# Patient Record
Sex: FEMALE | Race: BLACK OR AFRICAN AMERICAN | Employment: PART TIME | URBAN - METROPOLITAN AREA
[De-identification: names, ages, dates, MRNs, and addresses within clinical notes are randomized per-mention and may not be internally consistent; named-entity substitution may affect disease eponyms.]

---

## 2017-01-15 ENCOUNTER — HOSPITAL ENCOUNTER (EMERGENCY)
Age: 27
Discharge: HOME | End: 2017-01-15
Attending: EMERGENCY MEDICINE
Payer: SELF-PAY

## 2017-01-15 VITALS
HEART RATE: 90 BPM | OXYGEN SATURATION: 100 % | BODY MASS INDEX: 36.65 KG/M2 | WEIGHT: 220 LBS | RESPIRATION RATE: 14 BRPM | HEIGHT: 65 IN | TEMPERATURE: 98.2 F | SYSTOLIC BLOOD PRESSURE: 118 MMHG | DIASTOLIC BLOOD PRESSURE: 81 MMHG

## 2017-01-15 DIAGNOSIS — K08.89 DENTALGIA: Primary | ICD-10-CM

## 2017-01-15 PROCEDURE — 99283 EMERGENCY DEPT VISIT LOW MDM: CPT

## 2017-01-15 PROCEDURE — 74011000250 HC RX REV CODE- 250: Performed by: PHYSICIAN ASSISTANT

## 2017-01-15 PROCEDURE — 74011250637 HC RX REV CODE- 250/637: Performed by: PHYSICIAN ASSISTANT

## 2017-01-15 RX ORDER — PENICILLIN V POTASSIUM 500 MG/1
500 TABLET, FILM COATED ORAL 4 TIMES DAILY
Qty: 28 TAB | Refills: 0 | Status: SHIPPED | OUTPATIENT
Start: 2017-01-15 | End: 2017-01-22

## 2017-01-15 RX ORDER — TRAMADOL HYDROCHLORIDE 50 MG/1
50 TABLET ORAL
Qty: 20 TAB | Refills: 0 | Status: SHIPPED | OUTPATIENT
Start: 2017-01-15 | End: 2017-04-16

## 2017-01-15 RX ORDER — IBUPROFEN 400 MG/1
800 TABLET ORAL
Status: COMPLETED | OUTPATIENT
Start: 2017-01-15 | End: 2017-01-15

## 2017-01-15 RX ORDER — ETONOGESTREL AND ETHINYL ESTRADIOL 11.7; 2.7 MG/1; MG/1
INSERT, EXTENDED RELEASE VAGINAL
COMMUNITY
End: 2017-12-07

## 2017-01-15 RX ORDER — NAPROXEN 500 MG/1
500 TABLET ORAL
Qty: 20 TAB | Refills: 0 | Status: SHIPPED | OUTPATIENT
Start: 2017-01-15 | End: 2017-02-22

## 2017-01-15 RX ADMIN — IBUPROFEN 800 MG: 400 TABLET, FILM COATED ORAL at 19:50

## 2017-01-15 RX ADMIN — LIDOCAINE HYDROCHLORIDE: 20 SOLUTION ORAL; TOPICAL at 19:50

## 2017-01-16 NOTE — ED NOTES
Patient (s)  given copy of dc instructions and 1 paper script(s) and 2 electronic scripts. Patient (s)  verbalized understanding of instructions and script (s). Patient given a current medication reconciliation form and verbalized understanding of their medications. Patient (s) verbalized understanding of the importance of discussing medications with  his or her physician or clinic they will be following up with. Patient alert and oriented and in no acute distress. Patient discharged home ambulatory with dental balls.

## 2017-01-16 NOTE — ED NOTES
Patient seen for complaints of upper L dental pain x 1 week that worsened over night. Reports some bleeding to gum area yesterday and today. Denies fever. Redness and swelling noted to gum area surround molars. Denies chipping tooth or lost filling. Emergency Department Nursing Plan of Care       The Nursing Plan of Care is developed from the Nursing assessment and Emergency Department Attending provider initial evaluation. The plan of care may be reviewed in the ED Provider note.     The Plan of Care was developed with the following considerations:   Patient / Family readiness to learn indicated by:verbalized understanding  Persons(s) to be included in education: patient  Barriers to Learning/Limitations:No    Signed     Carito Sims    1/15/2017   1949

## 2017-01-16 NOTE — DISCHARGE INSTRUCTIONS
Emergency [address] by [place] [facility]  [address] [facility], [address] [facility]  Open M, W, F: 8AM - 5PM and T, Th: 8AM-6PM  Phone: [phone], press 4  $70 for Emergency Care  $60 for first routine care, then pay by sliding scale based upon income. [facility][note 3]  [address] Bhakta [address]  Phone: [phone]    [address] [facility]  [address] [place] [address], [address]  Phone: [phone]  Fee: $75 Routine Extraction, $125 Complex Extraction  Open Monday - Friday 8AM - 5:00PM    [facility]  [address], [address]  Open Monday - Friday 8AM - 4:30 PM  Phone: [phone] of Dentistry Urgent [address] [place] [address], [address] [place] [address], [place] [address], 1st Floor  [facility] starting at 8:30 AM M-F  Phone: [phone], press 2  Fee: $150 per tooth (x-ray & extractions only)  Pediatrics Phone[de-identified] [phone], 8-5 M-F    [address] [facility], [address] [place] [address], 2nd Floor, [address] [place] [address] starting at 8:30 AM - 3 PM [address] [place] [address]  [address] [facility], [address] [place] [address]  Phone: [phone] or [phone]  Emergency Hours: 9:30AM - 11AM (extractions)  Simple tooth extraction $ per tooth. #75 for x-ray    [facility] Residents only, over the age of 25  Phone: [phone]. Leave message saying you need an appointment to register. Hours: Tuesday Evenings       Periodontal Abscess: Care Instructions  Your Care Instructions  A periodontal abscess is a pocket of pus in the tissues of the gum. It looks like a small red ball pushing out of the swollen gum. An abscess can occur with serious gum disease (periodontitis), which causes the gums to pull away from the teeth. This leaves deep pockets where bacteria can grow.  If tartar builds up too much, or if food gets stuck in the pockets, pus forms. If the pus can't drain, it forms an abscess. An abscess can cause a fever and a throbbing pain in nearby teeth. It can also cause long-term damage to your teeth and gums. The teeth may get loose and fall out. The infection can spread to another part of your body. In most cases, your dentist will give you antibiotics to stop the infection. He or she may need to cut open (maddy) the abscess so that the infection can drain. This should relieve your pain. You may also need more dental treatment, such as tooth removal or oral surgery to fix bone damage caused by the abscess. Follow-up care is a key part of your treatment and safety. Be sure to make and go to all appointments, and call your doctor if you are having problems. It's also a good idea to know your test results and keep a list of the medicines you take. How can you care for yourself at home? · Reduce pain and swelling in your face and jaw by putting ice or a cold pack on the outside of your cheek for 10 to 20 minutes at a time. Put a thin cloth between the ice and your skin. · Take pain medicines exactly as directed. ¨ If the doctor gave you a prescription medicine for pain, take it as prescribed. ¨ If you are not taking a prescription pain medicine, ask your doctor if you can take an over-the-counter medicine. · Take your antibiotics as directed. Do not stop taking them just because you feel better. You need to take the full course of antibiotics. To prevent periodontal abscess  · Brush and floss every day, and have regular dental checkups. · Eat a healthy diet, and avoid sugary foods and drinks. · Do not smoke or use spit tobacco. Tobacco use slows your ability to heal. It also increases your risk for gum disease and cancer of the mouth and throat. If you need help quitting, talk to your doctor about stop-smoking programs and medicines.  These can increase your chances of quitting for good. When should you call for help? Call 911 anytime you think you may need emergency care. For example, call if:  · You have severe trouble breathing. Call your doctor now or seek immediate medical care if:  · You have new pain, or your pain gets a lot worse. · You have new symptoms, such as a fever, swelling in or around the abscess, or problems swallowing. Watch closely for changes in your health, and be sure to contact your doctor if:  · You do not get better as expected. Where can you learn more? Go to http://kalia-everett.info/. Enter O179 in the search box to learn more about \"Periodontal Abscess: Care Instructions. \"  Current as of: August 9, 2016  Content Version: 11.1  © 9159-5129 [employer]. Care instructions adapted under license by [employer] (which disclaims liability or warranty for this information). If you have questions about a medical condition or this instruction, always ask your healthcare professional. [address] any warranty or liability for your use of this information.

## 2017-01-16 NOTE — ED PROVIDER NOTES
Patient is a 23 y.o. female presenting with dental problem. The history is provided by the patient. Dental Pain    This is a new (Pt reports L upper dental pain starting yesterday evening. Denies fever, chills, drainage, n/v, dental caries. Reports seeing dentist a couple months ago and having normal check up.) problem. The current episode started yesterday. The problem occurs constantly. The problem has been gradually worsening. The pain is located in the left upper mouth. The quality of the pain is constant and aching. The pain is at a severity of 10/10. Associated symptoms include gum redness. There was no vomiting, no nausea, no fever, no swelling, no chest pain, no shortness of breath, no headaches and no drainage. The patient has no cardiac history. Past Medical History:   Diagnosis Date    Abnormal Pap smear     Anemia NEC      per pt takes iron PO    Asthma      bronchitis    Breastfeeding (infant)     Complication of anesthesia      never had    HX OTHER MEDICAL      Pregnant       Past Surgical History:   Procedure Laterality Date    Hx gyn       c section         Family History:   Problem Relation Age of Onset    Cancer Maternal Grandfather     Diabetes Paternal Grandmother        Social History     Social History    Marital status: SINGLE     Spouse name: N/A    Number of children: N/A    Years of education: N/A     Occupational History    Not on file. Social History Main Topics    Smoking status: Former Smoker    Smokeless tobacco: Former User     Quit date: 8/26/2015    Alcohol use Yes      Comment: socially    Drug use: No    Sexual activity: Yes     Partners: Male     Birth control/ protection: Condom, None     Other Topics Concern    Not on file     Social History Narrative         ALLERGIES: Review of patient's allergies indicates no known allergies. Review of Systems   Constitutional: Negative. Negative for activity change, chills, fatigue and fever.    HENT: Positive for dental problem. Negative for congestion, drooling, ear discharge, ear pain, facial swelling, hearing loss, mouth sores, nosebleeds, postnasal drip, rhinorrhea, sinus pressure, sore throat and trouble swallowing. Eyes: Negative. Negative for pain and discharge. Respiratory: Negative. Negative for cough, chest tightness and shortness of breath. Cardiovascular: Negative. Negative for chest pain. Gastrointestinal: Negative for constipation, diarrhea, nausea and vomiting. Genitourinary: Negative. Musculoskeletal: Negative. Negative for joint swelling. Skin: Negative. Negative for rash. Neurological: Negative. Negative for dizziness, speech difficulty, light-headedness and headaches. Psychiatric/Behavioral: Negative. Vitals:    01/15/17 1939   BP: 118/81   Pulse: 90   Resp: 14   Temp: 98.2 °F (36.8 °C)   SpO2: 100%   Weight: 99.8 kg (220 lb)   Height: 5' 5\" (1.651 m)            Physical Exam   Constitutional: She is oriented to person, place, and time. She appears well-developed and well-nourished. No distress. HENT:   Head: Normocephalic and atraumatic. Right Ear: Hearing, tympanic membrane, external ear and ear canal normal.   Left Ear: Hearing, tympanic membrane, external ear and ear canal normal.   Nose: Nose normal. No mucosal edema or rhinorrhea. Right sinus exhibits no maxillary sinus tenderness and no frontal sinus tenderness. Left sinus exhibits no maxillary sinus tenderness and no frontal sinus tenderness. Mouth/Throat: Oropharynx is clear and moist and mucous membranes are normal. She does not have dentures. No oral lesions. No trismus in the jaw. Normal dentition. No dental abscesses, uvula swelling, lacerations or dental caries. No oropharyngeal exudate, posterior oropharyngeal edema or posterior oropharyngeal erythema. Erythematous and TTP over left upper dentition.    Eyes: Conjunctivae and EOM are normal. Pupils are equal, round, and reactive to light.   Neck: Normal range of motion. Neck supple. Cardiovascular: Normal rate, regular rhythm and normal heart sounds. Pulmonary/Chest: Effort normal and breath sounds normal.   Neurological: She is alert and oriented to person, place, and time. No cranial nerve deficit. Skin: Skin is warm and dry. She is not diaphoretic. Psychiatric: She has a normal mood and affect. Her behavior is normal. Judgment and thought content normal.   Nursing note and vitals reviewed. MDM  Number of Diagnoses or Management Options  Dentalgia:   Diagnosis management comments: DDx: dentalgia, dental abscess, dental caries    LABORATORY TESTS:  No results found for this or any previous visit (from the past 12 hour(s)). IMAGING RESULTS:  No orders to display    MEDICATIONS GIVEN:  Medications  dental ball (lidocaine/Benadryl/Cetacaine) mixture ( Mucous Membrane Given 1/15/17 1950)  ibuprofen (MOTRIN) tablet 800 mg (800 mg Oral Given 1/15/17 1950)    IMPRESSION:  Arianna Anthony  (primary encounter diagnosis)    PLAN:  1. Current Discharge Medication List    START taking these medications    naproxen (NAPROSYN) 500 mg tablet  Take 1 Tab by mouth two (2) times daily as needed. Qty: 20 Tab Refills: 0    penicillin v potassium (VEETID) 500 mg tablet  Take 1 Tab by mouth four (4) times daily for 7 days. Qty: 28 Tab Refills: 0    traMADol (ULTRAM) 50 mg tablet  Take 1 Tab by mouth every six (6) hours as needed for Pain. Max Daily Amount: 200 mg. Qty: 20 Tab Refills: 0      CONTINUE these medications which have NOT CHANGED    ethinyl estradiol-etonogestrel (NUVARING) 0.12-0.015 mg/24 hr vaginal ring  by Intravaginal route. !! albuterol (PROVENTIL HFA, VENTOLIN HFA, PROAIR HFA) 90 mcg/actuation inhaler  Take 1 Puff by inhalation every four (4) hours as needed for Wheezing or Shortness of Breath.   Qty: 1 Inhaler Refills: 0    !! albuterol (PROVENTIL HFA, VENTOLIN HFA, PROAIR HFA) 90 mcg/actuation inhaler  Take 1-2 Puffs by inhalation every four (4) hours as needed for Wheezing. Qty: 1 Inhaler Refills: 0    !! - Potential duplicate medications found. Please discuss with provider. STOP taking these medications    chlorpheniramine-HYDROcodone (TUSSIONEX PENNKINETIC ER) 10-8 mg/5 mL suspension  Comments:  Reason for Stopping:    cetirizine-psuedoePHEDrine (ZYRTEC-D) 5-120 mg per tablet  Comments:  Reason for Stopping:    cyclobenzaprine (FLEXERIL) 10 mg tablet  Comments:  Reason for Stoppin. Follow-up Information     Follow up With Details Comments Lanie Guadarrama MD Schedule an appointment as soon as possible   for a visit in 1 week As needed, If symptoms worsen [address] [facility] [address] [facility] Schedule an appointment as soon   as possible for a visit in 1 week As needed, If symptoms worsen Gardner Ji  [phone]      Return to ED if worse                  Amount and/or Complexity of Data Reviewed  Tests in the medicine section of CPT®: ordered and reviewed    Patient Progress  Patient progress: stable    ED Course       Procedures    7:55 PM  I have discussed with patient their diagnosis, treatment, and follow up plan. The patient agrees to follow up as outlined in discharge paperwork and also to return to the ED with any worsening.  Guzman Ramon PA-C

## 2017-02-22 ENCOUNTER — HOSPITAL ENCOUNTER (EMERGENCY)
Age: 27
Discharge: HOME | End: 2017-02-22
Attending: EMERGENCY MEDICINE
Payer: MEDICAID

## 2017-02-22 ENCOUNTER — APPOINTMENT (OUTPATIENT)
Dept: GENERAL RADIOLOGY | Age: 27
End: 2017-02-22
Attending: EMERGENCY MEDICINE
Payer: MEDICAID

## 2017-02-22 VITALS
WEIGHT: 210 LBS | RESPIRATION RATE: 18 BRPM | OXYGEN SATURATION: 99 % | HEART RATE: 98 BPM | TEMPERATURE: 98.3 F | HEIGHT: 65 IN | SYSTOLIC BLOOD PRESSURE: 121 MMHG | DIASTOLIC BLOOD PRESSURE: 66 MMHG | BODY MASS INDEX: 34.99 KG/M2

## 2017-02-22 DIAGNOSIS — S90.31XA CONTUSION OF RIGHT FOOT, INITIAL ENCOUNTER: Primary | ICD-10-CM

## 2017-02-22 PROCEDURE — 73630 X-RAY EXAM OF FOOT: CPT

## 2017-02-22 PROCEDURE — 99283 EMERGENCY DEPT VISIT LOW MDM: CPT

## 2017-02-22 PROCEDURE — 74011250637 HC RX REV CODE- 250/637: Performed by: EMERGENCY MEDICINE

## 2017-02-22 RX ORDER — IBUPROFEN 600 MG/1
600 TABLET ORAL
Status: COMPLETED | OUTPATIENT
Start: 2017-02-22 | End: 2017-02-22

## 2017-02-22 RX ORDER — NAPROXEN 500 MG/1
500 TABLET ORAL
Qty: 20 TAB | Refills: 0 | Status: SHIPPED | OUTPATIENT
Start: 2017-02-22 | End: 2017-04-16

## 2017-02-22 RX ADMIN — IBUPROFEN 600 MG: 600 TABLET, FILM COATED ORAL at 14:43

## 2017-02-22 NOTE — ED PROVIDER NOTES
Patient is a 23 y.o. female presenting with foot pain. The history is provided by the patient. Foot Pain    This is a new (Acute Rt foot pain and sweeling immediately PTA after a TV fell on pt's foot. ) problem. The current episode started less than 1 hour ago. The problem occurs constantly. The problem has not changed since onset. The pain is present in the right foot. The quality of the pain is described as constant. The pain is at a severity of 10/10. Pertinent negatives include no numbness, full range of motion, no stiffness, no tingling, no itching, no back pain and no neck pain. The symptoms are aggravated by contact, movement and palpation. She has tried nothing for the symptoms. There has been a history of trauma. Past Medical History:   Diagnosis Date    Abnormal Pap smear     Anemia NEC     per pt takes iron PO    Asthma     bronchitis    Breastfeeding (infant)     Complication of anesthesia     never had    HX OTHER MEDICAL     Pregnant       Past Surgical History:   Procedure Laterality Date    HX GYN      c section         Family History:   Problem Relation Age of Onset    Cancer Maternal Grandfather     Diabetes Paternal Grandmother        Social History     Social History    Marital status: SINGLE     Spouse name: N/A    Number of children: N/A    Years of education: N/A     Occupational History    Not on file. Social History Main Topics    Smoking status: Current Every Day Smoker     Packs/day: 0.25    Smokeless tobacco: Former User     Quit date: 8/26/2015      Comment: black and milds    Alcohol use Yes      Comment: socially    Drug use: No    Sexual activity: Yes     Partners: Male     Birth control/ protection: Condom, None     Other Topics Concern    Not on file     Social History Narrative         ALLERGIES: Review of patient's allergies indicates no known allergies. Review of Systems   Constitutional: Negative. Negative for chills, fatigue and fever. Respiratory: Negative. Negative for cough and shortness of breath. Cardiovascular: Negative. Negative for chest pain, palpitations and leg swelling. Gastrointestinal: Negative. Negative for abdominal pain, diarrhea, nausea and vomiting. Genitourinary: Negative. Negative for difficulty urinating and dysuria. Musculoskeletal: Positive for arthralgias (Rt foot), joint swelling and myalgias. Negative for back pain, gait problem, neck pain and stiffness. Skin: Negative. Negative for color change, itching, rash and wound. Neurological: Negative. Negative for dizziness, tingling, numbness and headaches. Psychiatric/Behavioral: Negative. Vitals:    02/22/17 1435   BP: 121/66   Pulse: 98   Resp: 18   Temp: 98.3 °F (36.8 °C)   SpO2: 99%   Weight: 95.3 kg (210 lb)   Height: 5' 5\" (1.651 m)            Physical Exam   Constitutional: She is oriented to person, place, and time. She appears well-developed and well-nourished. No distress. HENT:   Head: Normocephalic and atraumatic. Right Ear: Hearing and external ear normal.   Left Ear: Hearing and external ear normal.   Nose: Nose normal.   Eyes: Conjunctivae and EOM are normal. Pupils are equal, round, and reactive to light. Neck: Normal range of motion. Pulmonary/Chest: Effort normal. No respiratory distress. Musculoskeletal:        Right ankle: Normal.        Right lower leg: Normal.        Right foot: There is tenderness, bony tenderness and swelling. There is normal capillary refill, no crepitus, no deformity and no laceration. Pt ambulatory into room. Minimal swelling to Rt foot. Negative erythema, wound, warmth. Neurological: She is alert and oriented to person, place, and time. No cranial nerve deficit. Skin: Skin is warm and dry. She is not diaphoretic. Psychiatric: She has a normal mood and affect. Her behavior is normal. Judgment and thought content normal.   Nursing note and vitals reviewed.        MDM  Number of Diagnoses or Management Options  Contusion of right foot, initial encounter:   Diagnosis management comments: DDx: Rt foot fx, dislocation, sprain, strain, contusion    LABORATORY TESTS:  No results found for this or any previous visit (from the past 12 hour(s)). IMAGING RESULTS:  XR FOOT RT MIN 3 V   Final Result   FINDINGS: Three views of the right foot demonstrate no fracture or other acute  osseous or articular abnormality. The soft tissues are within normal limits.     IMPRESSION  IMPRESSION: No acute abnormality. MEDICATIONS GIVEN:  Medications  ibuprofen (MOTRIN) tablet 600 mg (600 mg Oral Given 2/22/17 1443)    IMPRESSION:  Contusion of right foot, initial encounter  (primary encounter diagnosis)    PLAN:  1. Current Discharge Medication List    CONTINUE these medications which have CHANGED    naproxen (NAPROSYN) 500 mg tablet  Take 1 Tab by mouth two (2) times daily as needed. Qty: 20 Tab Refills: 0      CONTINUE these medications which have NOT CHANGED    traMADol (ULTRAM) 50 mg tablet  Take 1 Tab by mouth every six (6) hours as needed for Pain. Max Daily Amount: 200 mg. Qty: 20 Tab Refills: 0    ethinyl estradiol-etonogestrel (NUVARING) 0.12-0.015 mg/24 hr vaginal ring  by Intravaginal route. !! albuterol (PROVENTIL HFA, VENTOLIN HFA, PROAIR HFA) 90 mcg/actuation inhaler  Take 1 Puff by inhalation every four (4) hours as needed for Wheezing or Shortness of Breath. Qty: 1 Inhaler Refills: 0    !! albuterol (PROVENTIL HFA, VENTOLIN HFA, PROAIR HFA) 90 mcg/actuation inhaler  Take 1-2 Puffs by inhalation every four (4) hours as needed for Wheezing. Qty: 1 Inhaler Refills: 0    !! - Potential duplicate medications found. Please discuss with provider.         2. Follow-up Information     Follow up With Details Comments Contact Tamra Weeks MD Schedule an appointment as soon as possible   for a visit in 1 week As needed, If symptoms worsen [address]  [phone] [facility] Schedule an appointment as soon as possible for a visit in 1   week As needed, If symptoms worsen [address] [place] [address]  [phone]      Return to ED if worse                  Amount and/or Complexity of Data Reviewed  Tests in the radiology section of CPT®: ordered and reviewed  Tests in the medicine section of CPT®: ordered and reviewed    Patient Progress  Patient progress: stable    ED Course       Procedures      3:30 PM  I have discussed with patient their diagnosis, treatment, and follow up plan. The patient agrees to follow up as outlined in discharge paperwork and also to return to the ED with any worsening.  Therese Hinton PA-C

## 2017-02-22 NOTE — LETTER
[facility] EMERGENCY DEPT 
[address] [facility] [id number] [phone] Work/School Note Date: 2/22/2017 To Whom It May concern: 
 
Mago Poe was seen and treated today in the emergency room by the following provider(s): 
Physician Assistant: Cayden Schaffer PA-C. Mago Poe may return to work on 2/24/17.  
 
Sincerely, 
 
 
 
 
Mone Casillas

## 2017-02-22 NOTE — DISCHARGE INSTRUCTIONS
Contusion: Care Instructions  Your Care Instructions  Contusion is the medical term for a bruise. It is the result of a direct blow or an impact, such as a fall. Contusions are common sports injuries. Most people think of a bruise as a black-and-blue spot. This happens when small blood vessels get torn and leak blood under the skin. But bones, muscles, and organs can also get bruised. This may damage deep tissues but not cause a bruise you can see. The doctor will do a physical exam to find the location of your contusion. You may also have tests to make sure you do not have a more serious injury, such as a broken bone or nerve damage. These may include X-rays or other imaging tests like a CT scan or MRI. Deep-tissue contusions may cause pain and swelling. But if there is no serious damage, they will often get better in a few weeks with home treatment. The doctor has checked you carefully, but problems can develop later. If you notice any problems or new symptoms, get medical treatment right away. Follow-up care is a key part of your treatment and safety. Be sure to make and go to all appointments, and call your doctor if you are having problems. It's also a good idea to know your test results and keep a list of the medicines you take. How can you care for yourself at home? · Put ice or a cold pack on the sore area for 10 to 20 minutes at a time to stop swelling. Put a thin cloth between the ice pack and your skin. · Be safe with medicines. Read and follow all instructions on the label. ¨ If the doctor gave you a prescription medicine for pain, take it as prescribed. ¨ If you are not taking a prescription pain medicine, ask your doctor if you can take an over-the-counter medicine. · If you can, prop up the sore area on pillows as much as possible for the next few days. Try to keep the sore area above the level of your heart. When should you call for help?   Call your doctor now or seek immediate medical care if:  · Your pain gets worse. · You have new or worse swelling. · You have tingling, weakness, or numbness in the area near the contusion. · The area near the contusion is cold or pale. Watch closely for changes in your health, and be sure to contact your doctor if:  · You do not get better as expected. Where can you learn more? Go to [employer]  Enter [id number] in the search box to learn more about \"Contusion: Care Instructions. \"   © 6930-9045 [employer]. Care instructions adapted under license by [employer] (which disclaims liability or warranty for this information). This care instruction is for use with your licensed healthcare professional. If you have questions about a medical condition or this instruction, always ask your healthcare professional. [address] any warranty or liability for your use of this information. Content Version: 32.9.497591; Current as of: May 22, 2015           Learning About RICE (Rest, Ice, Compression, and Elevation)  What is RICE? RICE is a way to care for an injury. RICE helps relieve pain and swelling. It may also help with healing and flexibility. RICE stands for:  · Rest and protect the injured or sore area. · Ice or a cold pack used as soon as possible. · Compression, or wrapping the injured or sore area with an elastic bandage. · Elevation (propping up) the injured or sore area. How do you do RICE? You can use RICE for home treatment when you have general aches and pains or after an injury or surgery. Rest  · Do not put weight on the injury for at least 24 to 48 hours. · Use crutches for a badly sprained knee or ankle. · Support a sprained wrist, elbow, or shoulder with a sling. Ice  · Put ice or a cold pack on the injury right away to reduce pain and swelling. Frozen vegetables will also work as an ice pack. Put a thin cloth between the ice or cold pack and your skin.  The cloth protects the injured area from getting too cold. · Use ice for 10 to 15 minutes at a time for the first 48 to 72 hours. Compression  · Use compression for sprains, strains, and surgeries of the arms and legs. · Wrap the injured area with an elastic bandage or compression sleeve to reduce swelling. · Don't wrap it too tightly. If the area below it feels numb, tingles, or feels cool, loosen the wrap. Elevation  · Use elevation for areas of the body that can be propped up, such as arms and legs. · Prop up the injured area on pillows whenever you use ice. Keep it propped up anytime you sit or lie down. · Try to keep the injured area at or above the level of your heart. This will help reduce swelling and bruising. Where can you learn more? Go to http://kalia-everett.info/. Enter R414 in the search box to learn more about \"Learning About RICE (Rest, Ice, Compression, and Elevation). \"  Current as of: May 23, 2016  Content Version: 11.1  © 8962-2983 [employer]. Care instructions adapted under license by [employer] (which disclaims liability or warranty for this information). If you have questions about a medical condition or this instruction, always ask your healthcare professional. [address] any warranty or liability for your use of this information.

## 2017-02-22 NOTE — ED NOTES
Emergency Department Nursing Plan of Care       The Nursing Plan of Care is developed from the Nursing assessment and Emergency Department Attending provider initial evaluation. The plan of care may be reviewed in the ED Provider note.     The Plan of Care was developed with the following considerations:   Patient / Family readiness to learn indicated by:verbalized understanding  Persons(s) to be included in education: patient  Barriers to Learning/Limitations:No    Signed     Margaret Josue RN    2/22/2017   3:10 PM

## 2017-04-16 ENCOUNTER — APPOINTMENT (OUTPATIENT)
Dept: CT IMAGING | Age: 27
End: 2017-04-16
Attending: EMERGENCY MEDICINE
Payer: MEDICAID

## 2017-04-16 ENCOUNTER — HOSPITAL ENCOUNTER (EMERGENCY)
Age: 27
Discharge: HOME | End: 2017-04-16
Attending: EMERGENCY MEDICINE | Admitting: EMERGENCY MEDICINE
Payer: MEDICAID

## 2017-04-16 ENCOUNTER — APPOINTMENT (OUTPATIENT)
Dept: GENERAL RADIOLOGY | Age: 27
End: 2017-04-16
Attending: EMERGENCY MEDICINE
Payer: MEDICAID

## 2017-04-16 VITALS
RESPIRATION RATE: 10 BRPM | HEART RATE: 84 BPM | HEIGHT: 65 IN | TEMPERATURE: 98.6 F | SYSTOLIC BLOOD PRESSURE: 127 MMHG | WEIGHT: 222.44 LBS | BODY MASS INDEX: 37.06 KG/M2 | OXYGEN SATURATION: 99 % | DIASTOLIC BLOOD PRESSURE: 64 MMHG

## 2017-04-16 DIAGNOSIS — I30.9 ACUTE PERICARDITIS, UNSPECIFIED TYPE: ICD-10-CM

## 2017-04-16 DIAGNOSIS — R07.9 CHEST PAIN, UNSPECIFIED TYPE: Primary | ICD-10-CM

## 2017-04-16 LAB
ALBUMIN SERPL BCP-MCNC: 3 G/DL (ref 3.5–5)
ALBUMIN/GLOB SERPL: 0.7 {RATIO} (ref 1.1–2.2)
ALP SERPL-CCNC: 93 U/L (ref 45–117)
ALT SERPL-CCNC: 16 U/L (ref 12–78)
ANION GAP BLD CALC-SCNC: 9 MMOL/L (ref 5–15)
AST SERPL W P-5'-P-CCNC: 12 U/L (ref 15–37)
BASOPHILS # BLD AUTO: 0 K/UL (ref 0–0.1)
BASOPHILS # BLD: 0 % (ref 0–1)
BILIRUB SERPL-MCNC: 0.1 MG/DL (ref 0.2–1)
BUN SERPL-MCNC: 11 MG/DL (ref 6–20)
BUN/CREAT SERPL: 16 (ref 12–20)
CALCIUM SERPL-MCNC: 8.3 MG/DL (ref 8.5–10.1)
CHLORIDE SERPL-SCNC: 112 MMOL/L (ref 97–108)
CO2 SERPL-SCNC: 22 MMOL/L (ref 21–32)
CREAT SERPL-MCNC: 0.67 MG/DL (ref 0.55–1.02)
D DIMER PPP FEU-MCNC: 0.61 MG/L FEU (ref 0–0.65)
EOSINOPHIL # BLD: 0.2 K/UL (ref 0–0.4)
EOSINOPHIL NFR BLD: 2 % (ref 0–7)
ERYTHROCYTE [DISTWIDTH] IN BLOOD BY AUTOMATED COUNT: 17.1 % (ref 11.5–14.5)
GLOBULIN SER CALC-MCNC: 4.1 G/DL (ref 2–4)
GLUCOSE SERPL-MCNC: 126 MG/DL (ref 65–100)
HCT VFR BLD AUTO: 35.8 % (ref 35–47)
HGB BLD-MCNC: 11.2 G/DL (ref 11.5–16)
LIPASE SERPL-CCNC: 120 U/L (ref 73–393)
LYMPHOCYTES # BLD AUTO: 22 % (ref 12–49)
LYMPHOCYTES # BLD: 2.2 K/UL (ref 0.8–3.5)
MCH RBC QN AUTO: 24 PG (ref 26–34)
MCHC RBC AUTO-ENTMCNC: 31.3 G/DL (ref 30–36.5)
MCV RBC AUTO: 76.7 FL (ref 80–99)
MONOCYTES # BLD: 0.7 K/UL (ref 0–1)
MONOCYTES NFR BLD AUTO: 7 % (ref 5–13)
NEUTS SEG # BLD: 7 K/UL (ref 1.8–8)
NEUTS SEG NFR BLD AUTO: 69 % (ref 32–75)
PLATELET # BLD AUTO: 255 K/UL (ref 150–400)
POTASSIUM SERPL-SCNC: 4.2 MMOL/L (ref 3.5–5.1)
PROT SERPL-MCNC: 7.1 G/DL (ref 6.4–8.2)
RBC # BLD AUTO: 4.67 M/UL (ref 3.8–5.2)
SODIUM SERPL-SCNC: 143 MMOL/L (ref 136–145)
TROPONIN I SERPL-MCNC: <0.04 NG/ML
WBC # BLD AUTO: 10.2 K/UL (ref 3.6–11)

## 2017-04-16 PROCEDURE — 85379 FIBRIN DEGRADATION QUANT: CPT | Performed by: EMERGENCY MEDICINE

## 2017-04-16 PROCEDURE — 85025 COMPLETE CBC W/AUTO DIFF WBC: CPT | Performed by: EMERGENCY MEDICINE

## 2017-04-16 PROCEDURE — 80053 COMPREHEN METABOLIC PANEL: CPT | Performed by: EMERGENCY MEDICINE

## 2017-04-16 PROCEDURE — 74011636320 HC RX REV CODE- 636/320: Performed by: EMERGENCY MEDICINE

## 2017-04-16 PROCEDURE — 96374 THER/PROPH/DIAG INJ IV PUSH: CPT

## 2017-04-16 PROCEDURE — 74011250636 HC RX REV CODE- 250/636: Performed by: EMERGENCY MEDICINE

## 2017-04-16 PROCEDURE — 93005 ELECTROCARDIOGRAM TRACING: CPT

## 2017-04-16 PROCEDURE — 36415 COLL VENOUS BLD VENIPUNCTURE: CPT | Performed by: EMERGENCY MEDICINE

## 2017-04-16 PROCEDURE — 71010 XR CHEST PORT: CPT

## 2017-04-16 PROCEDURE — 83690 ASSAY OF LIPASE: CPT | Performed by: EMERGENCY MEDICINE

## 2017-04-16 PROCEDURE — 84484 ASSAY OF TROPONIN QUANT: CPT | Performed by: EMERGENCY MEDICINE

## 2017-04-16 PROCEDURE — 99284 EMERGENCY DEPT VISIT MOD MDM: CPT

## 2017-04-16 PROCEDURE — 71275 CT ANGIOGRAPHY CHEST: CPT

## 2017-04-16 RX ORDER — NEBULIZER AND COMPRESSOR
EACH MISCELLANEOUS
Qty: 1 EACH | Refills: 0 | Status: SHIPPED | OUTPATIENT
Start: 2017-04-16 | End: 2019-05-08

## 2017-04-16 RX ORDER — SODIUM CHLORIDE 0.9 % (FLUSH) 0.9 %
10 SYRINGE (ML) INJECTION
Status: COMPLETED | OUTPATIENT
Start: 2017-04-16 | End: 2017-04-16

## 2017-04-16 RX ORDER — ALBUTEROL SULFATE 0.83 MG/ML
2.5 SOLUTION RESPIRATORY (INHALATION)
Qty: 24 EACH | Refills: 1 | Status: SHIPPED | OUTPATIENT
Start: 2017-04-16 | End: 2019-05-08

## 2017-04-16 RX ORDER — SODIUM CHLORIDE 0.9 % (FLUSH) 0.9 %
5-10 SYRINGE (ML) INJECTION AS NEEDED
Status: DISCONTINUED | OUTPATIENT
Start: 2017-04-16 | End: 2017-04-16 | Stop reason: HOSPADM

## 2017-04-16 RX ORDER — KETOROLAC TROMETHAMINE 30 MG/ML
30 INJECTION, SOLUTION INTRAMUSCULAR; INTRAVENOUS
Status: COMPLETED | OUTPATIENT
Start: 2017-04-16 | End: 2017-04-16

## 2017-04-16 RX ORDER — SODIUM CHLORIDE 0.9 % (FLUSH) 0.9 %
5-10 SYRINGE (ML) INJECTION EVERY 8 HOURS
Status: DISCONTINUED | OUTPATIENT
Start: 2017-04-16 | End: 2017-04-16 | Stop reason: HOSPADM

## 2017-04-16 RX ORDER — SODIUM CHLORIDE 9 MG/ML
50 INJECTION, SOLUTION INTRAVENOUS
Status: COMPLETED | OUTPATIENT
Start: 2017-04-16 | End: 2017-04-16

## 2017-04-16 RX ORDER — NAPROXEN 500 MG/1
500 TABLET ORAL 2 TIMES DAILY WITH MEALS
Qty: 20 TAB | Refills: 0 | Status: SHIPPED | OUTPATIENT
Start: 2017-04-16 | End: 2017-04-26

## 2017-04-16 RX ADMIN — Medication 10 ML: at 10:40

## 2017-04-16 RX ADMIN — KETOROLAC TROMETHAMINE 30 MG: 30 INJECTION, SOLUTION INTRAMUSCULAR at 09:38

## 2017-04-16 RX ADMIN — SODIUM CHLORIDE 50 ML/HR: 900 INJECTION, SOLUTION INTRAVENOUS at 10:40

## 2017-04-16 RX ADMIN — IOPAMIDOL 100 ML: 755 INJECTION, SOLUTION INTRAVENOUS at 10:40

## 2017-04-16 RX ADMIN — Medication 10 ML: at 09:39

## 2017-04-16 NOTE — ED NOTES
Pt arrives to ED c/o CP when she coughs and lays down, pt states she has chronic bronchitis. Monitor MD jaylyn at bedside.

## 2017-04-16 NOTE — ED PROVIDER NOTES
HPI Comments: Nilson Childers is a 23 y.o. female with history significant for asthma and anemia presenting ambulatory to ED [facility] ED with c/o sudden onset of a cough and chest pain x one week. Pt reports she has chronic bronchitis and is currently on no treatments for it. Per pt, she has had a persistent cough for the past week with little relief. Pt reports her cough has exacerbated pain to her chest for the past week as well. Pt informs her chest pain is intermittent with a moderate 8/10 intensity and a pressuring sensation to the anterior chest. Pt reports her pain is improved with sitting and leaning forward and worse with laying flat. Pt states she has been taking her inhaler with mild improvement of her symptoms. Pt specifically denies any nausea, vomiting, fevers, chills, abdominal pain, urinary complications, or SOB. PCP: Brenna Mello MD    PSHx:   Social Hx: + EtOH; + Smoker; - Illicit Drugs    There are no other changes, complaints or physical findings at this time. Written by PENNY Pope, as dictated by Raz Gilbert MD.     The history is provided by the patient. Past Medical History:   Diagnosis Date    Abnormal Pap smear     Anemia NEC     per pt takes iron PO    Asthma     bronchitis    Breastfeeding (infant)     Complication of anesthesia     never had    HX OTHER MEDICAL     Pregnant     Past Surgical History:   Procedure Laterality Date    HX GYN      c section     Family History:   Problem Relation Age of Onset    Cancer Maternal Grandfather     Diabetes Paternal Grandmother      Social History     Social History    Marital status: SINGLE     Spouse name: N/A    Number of children: N/A    Years of education: N/A     Occupational History    Not on file.      Social History Main Topics    Smoking status: Current Every Day Smoker     Packs/day: 0.25    Smokeless tobacco: Former User     Quit date: 2015      Comment: black and milds  Alcohol use Yes      Comment: socially    Drug use: No    Sexual activity: Yes     Partners: Male     Birth control/ protection: Condom, None     Other Topics Concern    Not on file     Social History Narrative     ALLERGIES: Review of patient's allergies indicates no known allergies. Review of Systems   Constitutional: Negative. Negative for chills and fever. HENT: Negative. Negative for congestion and rhinorrhea. Respiratory: Positive for cough. Negative for chest tightness and wheezing. Cardiovascular: Positive for chest pain. Negative for palpitations. Gastrointestinal: Negative. Negative for abdominal pain, constipation, nausea and vomiting. Endocrine: Negative. Genitourinary: Negative. Negative for decreased urine volume, dysuria, flank pain, frequency, hematuria, pelvic pain and urgency. Musculoskeletal: Negative. Negative for back pain and neck pain. Skin: Negative. Negative for color change, pallor and rash. Neurological: Negative. Negative for dizziness, seizures, weakness, numbness and headaches. Hematological: Negative. Negative for adenopathy. Psychiatric/Behavioral: Negative. All other systems reviewed and are negative. Vitals:    04/16/17 0930 04/16/17 1000 04/16/17 1143 04/16/17 1145   BP: 136/77 136/76 127/64    Pulse: 99 97  84   Resp: 12 18  10   Temp:       SpO2: 98% 100%  99%   Weight:       Height:              Physical Exam   Constitutional: She is oriented to person, place, and time. She appears well-developed and well-nourished. No distress. HENT:   Head: Normocephalic and atraumatic. Mouth/Throat: No oropharyngeal exudate. Eyes: Conjunctivae are normal. Pupils are equal, round, and reactive to light. Right eye exhibits no discharge. Left eye exhibits no discharge. No scleral icterus. Neck: Normal range of motion. Neck supple. No JVD present. Cardiovascular: Regular rhythm and intact distal pulses. Tachycardia present.   PMI is not displaced. Exam reveals friction rub. Exam reveals no gallop. No murmur heard. Pulses:       Dorsalis pedis pulses are 2+ on the right side, and 2+ on the left side. Posterior tibial pulses are 2+ on the right side, and 2+ on the left side. Pulmonary/Chest: Effort normal and breath sounds normal. No stridor. No respiratory distress. She has no wheezes. She has no rales. She exhibits no tenderness. Abdominal: Soft. Bowel sounds are normal. She exhibits no distension and no mass. There is no tenderness. There is no rigidity, no rebound, no guarding, no CVA tenderness, no tenderness at McBurney's point and negative Avendano's sign. No hernia. Neurological: She is alert and oriented to person, place, and time. She displays normal reflexes. No cranial nerve deficit. She exhibits normal muscle tone. Coordination normal.   Skin: Skin is warm. No rash noted. She is not diaphoretic. No pallor. Nursing note and vitals reviewed. MDM  Number of Diagnoses or Management Options  Acute pericarditis, unspecified type:   Chest pain, unspecified type:   Diagnosis management comments: DDx: pericarditis, acute exacerbation of chronic bronchitis, PE, PNA, pneumothorax, coronary syndrome, pleurisy    History of chronic bronchitis tobacco abuse. Present to the ED with chest pain worse with cough and lying flat. Does have Nuvaring but no prior history of PE. Symptoms relieved with leaning forward. Will check labs to evaluate for PE and coronary event. Suspect possible pericarditis, will treat with NSAIDS.         Amount and/or Complexity of Data Reviewed  Clinical lab tests: ordered and reviewed  Tests in the radiology section of CPT®: ordered and reviewed  Tests in the medicine section of CPT®: ordered and reviewed  Obtain history from someone other than the patient: yes  Review and summarize past medical records: yes  Independent visualization of images, tracings, or specimens: yes    Risk of Complications, Morbidity, and/or Mortality  Presenting problems: moderate  Diagnostic procedures: moderate  Management options: low    Patient Progress  Patient progress: stable    ED Course     Procedures     EKG interpretation: (Preliminary) 2414  Rhythm: normal sinus rhythm; and regular . Rate (approx.): 97; Axis: normal; DC interval: normal; QRS interval: normal ; ST/T wave: normal;   This note is prepared by Octavio Vigil acting as Scribe for Airam Morris MD    Progress Note:   12:10 PM  Pt's pain is much improved. Results given. Ready for d/c. Written by Octavio Vigil, ED Scribe, as dictated by Airam Morris MD.     LABORATORY TESTS:  Recent Results (from the past 12 hour(s))   EKG, 12 LEAD, INITIAL    Collection Time: 04/16/17  9:18 AM   Result Value Ref Range    Ventricular Rate 97 BPM    Atrial Rate 97 BPM    P-R Interval 146 ms    QRS Duration 84 ms    Q-T Interval 346 ms    QTC Calculation (Bezet) 439 ms    Calculated P Axis 54 degrees    Calculated R Axis 42 degrees    Calculated T Axis 46 degrees    Diagnosis       Normal sinus rhythm  Normal ECG  When compared with ECG of 16-JAN-2016 11:56,  No significant change was found     CBC WITH AUTOMATED DIFF    Collection Time: 04/16/17  9:27 AM   Result Value Ref Range    WBC 10.2 3.6 - 11.0 K/uL    RBC 4.67 3.80 - 5.20 M/uL    HGB 11.2 (L) 11.5 - 16.0 g/dL    HCT 35.8 35.0 - 47.0 %    MCV 76.7 (L) 80.0 - 99.0 FL    MCH 24.0 (L) 26.0 - 34.0 PG    MCHC 31.3 30.0 - 36.5 g/dL    RDW 17.1 (H) 11.5 - 14.5 %    PLATELET 779 720 - 315 K/uL    NEUTROPHILS 69 32 - 75 %    LYMPHOCYTES 22 12 - 49 %    MONOCYTES 7 5 - 13 %    EOSINOPHILS 2 0 - 7 %    BASOPHILS 0 0 - 1 %    ABS. NEUTROPHILS 7.0 1.8 - 8.0 K/UL    ABS. LYMPHOCYTES 2.2 0.8 - 3.5 K/UL    ABS. MONOCYTES 0.7 0.0 - 1.0 K/UL    ABS. EOSINOPHILS 0.2 0.0 - 0.4 K/UL    ABS.  BASOPHILS 0.0 0.0 - 0.1 K/UL   METABOLIC PANEL, COMPREHENSIVE    Collection Time: 04/16/17  9:27 AM   Result Value Ref Range    Sodium 143 136 - 145 mmol/L    Potassium 4.2 3.5 - 5.1 mmol/L    Chloride 112 (H) 97 - 108 mmol/L    CO2 22 21 - 32 mmol/L    Anion gap 9 5 - 15 mmol/L    Glucose 126 (H) 65 - 100 mg/dL    BUN 11 6 - 20 MG/DL    Creatinine 0.67 0.55 - 1.02 MG/DL    BUN/Creatinine ratio 16 12 - 20      GFR est AA >60 >60 ml/min/1.73m2    GFR est non-AA >60 >60 ml/min/1.73m2    Calcium 8.3 (L) 8.5 - 10.1 MG/DL    Bilirubin, total 0.1 (L) 0.2 - 1.0 MG/DL    ALT (SGPT) 16 12 - 78 U/L    AST (SGOT) 12 (L) 15 - 37 U/L    Alk. phosphatase 93 45 - 117 U/L    Protein, total 7.1 6.4 - 8.2 g/dL    Albumin 3.0 (L) 3.5 - 5.0 g/dL    Globulin 4.1 (H) 2.0 - 4.0 g/dL    A-G Ratio 0.7 (L) 1.1 - 2.2     LIPASE    Collection Time: 04/16/17  9:27 AM   Result Value Ref Range    Lipase 120 73 - 393 U/L   TROPONIN I    Collection Time: 04/16/17  9:27 AM   Result Value Ref Range    Troponin-I, Qt. <0.04 <0.05 ng/mL   D DIMER    Collection Time: 04/16/17  9:27 AM   Result Value Ref Range    D-dimer 0.61 0.00 - 0.65 mg/L FEU     IMAGING RESULTS:  INDICATION: CP and cough for 3 days     Exam: Portable chest 0928 hours. Comparison December 12, 2016.     Findings: Cardiomediastinal silhouette is within normal limits. Pulmonary  vasculature is not engorged. There are no focal parenchymal opacities,  effusions, or pneumothorax.     IMPRESSION  Impression:  1. No acute disease    MEDICATIONS GIVEN:  Medications   sodium chloride (NS) flush 5-10 mL (10 mL IntraVENous Given 4/16/17 9272)   sodium chloride (NS) flush 5-10 mL (not administered)   ketorolac (TORADOL) injection 30 mg (30 mg IntraVENous Given 4/16/17 1682)   0.9% sodium chloride infusion (0 mL/hr IntraVENous IV Completed 4/16/17 1210)   iopamidol (ISOVUE-370) 76 % injection 100 mL (100 mL IntraVENous Given 4/16/17 1040)   sodium chloride (NS) flush 10 mL (10 mL IntraVENous Given 4/16/17 1040)     IMPRESSION:  1. Chest pain, unspecified type    2. Acute pericarditis, unspecified type      PLAN:  1.    Discharge Medication List as of 2017 12:01 PM      START taking these medications    Details   naproxen (NAPROSYN) 500 mg tablet Take 1 Tab by mouth two (2) times daily (with meals) for 10 days. , Normal, Disp-20 Tab, R-0      albuterol (PROVENTIL VENTOLIN) 2.5 mg /3 mL (0.083 %) nebulizer solution 3 mL by Nebulization route every four (4) hours as needed for Wheezing., Normal, Disp-24 Each, R-1      Nebulizer & Compressor machine UAD, Normal, Disp-1 Each, R-0         CONTINUE these medications which have NOT CHANGED    Details   ethinyl estradiol-etonogestrel (NUVARING) 0.12-0.015 mg/24 hr vaginal ring by Intravaginal route., Historical Med         STOP taking these medications       albuterol (PROVENTIL HFA, VENTOLIN HFA, PROAIR HFA) 90 mcg/actuation inhaler Comments:   Reason for Stoppin.   Follow-up Information     Follow up With Details Comments Contact Info    Flora Jones MD Call in 1 day  [address]  [phone]      [facility] EMERGENCY DEPT  If symptoms worsen [address] [phone]        Return to ED if worse     DISCHARGE NOTE:    12:16 PM  The patient is ready for discharge. The patient signs, symptoms, diagnosis, and discharge instructions have been discussed and the patient has conveyed their understanding. The patient is to follow-up as reccommended or returned to the ER should their symptoms worsen. Plan has been discussed and patient is in agreement. This note is prepared by Elaine Hicks acting as Scribe for Ora Campbell MD.    Ora Campbell MD: This scribe's documentation has been prepared under my direction and personally reviewed by me in its entirety. I confirm that the note above accurately reflects all work, treatment procedures and medical decision makings performed by me.

## 2017-04-16 NOTE — DISCHARGE INSTRUCTIONS
Chest Pain: Care Instructions  Your Care Instructions  There are many things that can cause chest pain. Some are not serious and will get better on their own in a few days. But some kinds of chest pain need more testing and treatment. Your doctor may have recommended a follow-up visit in the next 8 to 12 hours. If you are not getting better, you may need more tests or treatment. Even though your doctor has released you, you still need to watch for any problems. The doctor carefully checked you, but sometimes problems can develop later. If you have new symptoms or if your symptoms do not get better, get medical care right away. If you have worse or different chest pain or pressure that lasts more than 5 minutes or you passed out (lost consciousness), call 911 or seek other emergency help right away. A medical visit is only one step in your treatment. Even if you feel better, you still need to do what your doctor recommends, such as going to all suggested follow-up appointments and taking medicines exactly as directed. This will help you recover and help prevent future problems. How can you care for yourself at home? · Rest until you feel better. · Take your medicine exactly as prescribed. Call your doctor if you think you are having a problem with your medicine. · Do not drive after taking a prescription pain medicine. When should you call for help? Call 911 if:  · You passed out (lost consciousness). · You have severe difficulty breathing. · You have symptoms of a heart attack. These may include:  ¨ Chest pain or pressure, or a strange feeling in your chest.  ¨ Sweating. ¨ Shortness of breath. ¨ Nausea or vomiting. ¨ Pain, pressure, or a strange feeling in your back, neck, jaw, or upper belly or in one or both shoulders or arms. ¨ Lightheadedness or sudden weakness. ¨ A fast or irregular heartbeat.   After you call 911, the  may tell you to chew 1 adult-strength or 2 to 4 low-dose aspirin. Wait for an ambulance. Do not try to drive yourself. Call your doctor today if:  · You have any trouble breathing. · Your chest pain gets worse. · You are dizzy or lightheaded, or you feel like you may faint. · You are not getting better as expected. · You are having new or different chest pain. Where can you learn more? Go to http://kalia-everett.info/. Enter A120 in the search box to learn more about \"Chest Pain: Care Instructions. \"  Current as of: May 27, 2016  Content Version: 11.2  © 1557-5451 [employer]. Care instructions adapted under license by [employer] (which disclaims liability or warranty for this information). If you have questions about a medical condition or this instruction, always ask your healthcare professional. [address] any warranty or liability for your use of this information. Pericarditis: Care Instructions  Your Care Instructions    Pericarditis occurs when the membrane that surrounds the heart and its major blood vessels becomes inflamed. In most cases, the cause of pericarditis is not known. It can be caused by a virus, a heart attack, chest injury, or another illness. Pericarditis causes sharp chest pain, which gets worse when you lie down or take a deep breath. The pain gets better if you lean forward or sit up. Pericarditis often heals on its own and usually does not cause any further problems. Most people recover within a couple of weeks. Follow-up care is a key part of your treatment and safety. Be sure to make and go to all appointments, and call your doctor if you are having problems. It's also a good idea to know your test results and keep a list of the medicines you take. How can you care for yourself at home? · Watch for the return of your original symptoms. Sometimes pericarditis can come back after it has gone away. · Take your medicines exactly as prescribed.  Call your doctor if you think you are having a problem with your medicine. · Ask your doctor if you can take an over-the-counter pain medicine, such as acetaminophen (Tylenol), ibuprofen (Advil, Motrin), or naproxen (Aleve). Read and follow all instructions on the label. · Do not take two or more pain medicines at the same time unless the doctor told you to. Many pain medicines have acetaminophen, which is Tylenol. Too much acetaminophen (Tylenol) can be harmful. · Get plenty of rest until you feel better, especially if you have a fever. · Avoid exercise and strenuous activity that has not been approved by your doctor. Ask your doctor when you can be active again. When should you call for help? Call 911 anytime you think you may need emergency care. For example, call if:  · You have symptoms of a heart attack. These may include:  ¨ Chest pain or pressure, or a strange feeling in the chest.  ¨ Sweating. ¨ Shortness of breath. ¨ Nausea or vomiting. ¨ Pain, pressure, or a strange feeling in the back, neck, jaw, or upper belly or in one or both shoulders or arms. ¨ Lightheadedness or sudden weakness. ¨ A fast or irregular heartbeat. After you call 911, the  may tell you to chew 1 adult-strength or 2 to 4 low-dose aspirin. Wait for an ambulance. Do not try to drive yourself. · You have severe trouble breathing. · You passed out (lost consciousness). Call your doctor now or seek immediate medical care if:  · You cough up pink, foamy mucus. · You are dizzy or lightheaded, or you feel like you may faint. · You have any trouble breathing. · Your fever returns or gets worse. · You feel like you did when you first got sick. Watch closely for changes in your health, and be sure to contact your doctor if:  · You have swelling in your legs or ankles. · You do not get better as expected. Where can you learn more? Go to http://kaila-everett.info/.   Enter 692 6925 in the search box to learn more about \"Pericarditis: Care Instructions. \"  Current as of: 2016  Content Version: 11.2  © 5789-0846 [employer]. Care instructions adapted under license by [employer] (which disclaims liability or warranty for this information). If you have questions about a medical condition or this instruction, always ask your healthcare professional. [address] any warranty or liability for your use of this information. [employer] Activation    Thank you for requesting access to [employer]. Please follow the instructions below to securely access and download your online medical record. [employer] allows you to send messages to your doctor, view your test results, renew your prescriptions, schedule appointments, and more. How Do I Sign Up? 1. In your internet browser, go to [email or web address]  2. Click on the First Time User? Click Here link in the Sign In box. You will be redirect to the New Member Sign Up page. 3. Enter your [employer] Access Code exactly as it appears below. You will not need to use this code after youve completed the sign-up process. If you do not sign up before the expiration date, you must request a new code. [employer] Access Code: [email or web address]  Expires: 7/15/2017  9:29 AM (This is the date your [employer] access code will )    4. Enter the last four digits of your Social Security Number (xxxx) and Date of Birth (mm/dd/yyyy) as indicated and click Submit. You will be taken to the next sign-up page. 5. Create a [employer] ID. This will be your [employer] login ID and cannot be changed, so think of one that is secure and easy to remember. 6. Create a [employer] password. You can change your password at any time. 7. Enter your Password Reset Question and Answer. This can be used at a later time if you forget your password. 8. Enter your e-mail address. You will receive e-mail notification when new information is available in [address] 9. Click Sign Up.  You can now view and download portions of your medical record. 10. Click the Download Summary menu link to download a portable copy of your medical information. Additional Information    If you have questions, please visit the Frequently Asked Questions section of the [employer] website at [email or web address] Remember, [employer] is NOT to be used for urgent needs. For medical emergencies, dial 911.

## 2017-04-16 NOTE — LETTER
[address] [facility] EM[facility]ENCY [facility] 
[address] [place] [address] [id number] [phone] Work/School Note Date: 4/16/2017 To Whom It May concern: 
 
Espinoza Bryant was seen and treated today in the emergency room by the following provider(s): 
Attending Provider: Gal Evans MD. LeonBryce S Rosanne No work till 4/19/17 Sincerely, Gal Evans MD

## 2017-04-17 LAB
ATRIAL RATE: 97 BPM
CALCULATED P AXIS, ECG09: 54 DEGREES
CALCULATED R AXIS, ECG10: 42 DEGREES
CALCULATED T AXIS, ECG11: 46 DEGREES
DIAGNOSIS, 93000: NORMAL
P-R INTERVAL, ECG05: 146 MS
Q-T INTERVAL, ECG07: 346 MS
QRS DURATION, ECG06: 84 MS
QTC CALCULATION (BEZET), ECG08: 439 MS
VENTRICULAR RATE, ECG03: 97 BPM

## 2017-06-15 ENCOUNTER — APPOINTMENT (OUTPATIENT)
Dept: CT IMAGING | Age: 27
End: 2017-06-15
Attending: EMERGENCY MEDICINE
Payer: MEDICAID

## 2017-06-15 ENCOUNTER — HOSPITAL ENCOUNTER (EMERGENCY)
Age: 27
Discharge: HOME | End: 2017-06-15
Attending: EMERGENCY MEDICINE
Payer: MEDICAID

## 2017-06-15 VITALS
DIASTOLIC BLOOD PRESSURE: 67 MMHG | TEMPERATURE: 98.4 F | HEART RATE: 70 BPM | SYSTOLIC BLOOD PRESSURE: 118 MMHG | BODY MASS INDEX: 36.32 KG/M2 | OXYGEN SATURATION: 100 % | RESPIRATION RATE: 18 BRPM | HEIGHT: 65 IN | WEIGHT: 218 LBS

## 2017-06-15 DIAGNOSIS — N20.0 KIDNEY STONE: Primary | ICD-10-CM

## 2017-06-15 DIAGNOSIS — M53.3 SACROILIAC JOINT DISEASE: ICD-10-CM

## 2017-06-15 LAB
ALBUMIN SERPL BCP-MCNC: 3.3 G/DL (ref 3.5–5)
ALBUMIN/GLOB SERPL: 1 {RATIO} (ref 1.1–2.2)
ALP SERPL-CCNC: 80 U/L (ref 45–117)
ALT SERPL-CCNC: 15 U/L (ref 12–78)
AMORPH CRY URNS QL MICRO: ABNORMAL
ANION GAP BLD CALC-SCNC: 5 MMOL/L (ref 5–15)
APPEARANCE UR: ABNORMAL
AST SERPL W P-5'-P-CCNC: 8 U/L (ref 15–37)
BACTERIA URNS QL MICRO: NEGATIVE /HPF
BASOPHILS # BLD AUTO: 0 K/UL (ref 0–0.1)
BASOPHILS # BLD: 0 % (ref 0–1)
BILIRUB SERPL-MCNC: 0.2 MG/DL (ref 0.2–1)
BILIRUB UR QL: NEGATIVE
BUN SERPL-MCNC: 11 MG/DL (ref 6–20)
BUN/CREAT SERPL: 17 (ref 12–20)
CALCIUM SERPL-MCNC: 8 MG/DL (ref 8.5–10.1)
CHLORIDE SERPL-SCNC: 108 MMOL/L (ref 97–108)
CO2 SERPL-SCNC: 26 MMOL/L (ref 21–32)
COLOR UR: ABNORMAL
CREAT SERPL-MCNC: 0.64 MG/DL (ref 0.55–1.02)
EOSINOPHIL # BLD: 0.1 K/UL (ref 0–0.4)
EOSINOPHIL NFR BLD: 1 % (ref 0–7)
EPITH CASTS URNS QL MICRO: ABNORMAL /LPF
ERYTHROCYTE [DISTWIDTH] IN BLOOD BY AUTOMATED COUNT: 15.3 % (ref 11.5–14.5)
GLOBULIN SER CALC-MCNC: 3.2 G/DL (ref 2–4)
GLUCOSE SERPL-MCNC: 89 MG/DL (ref 65–100)
GLUCOSE UR STRIP.AUTO-MCNC: NEGATIVE MG/DL
HCG UR QL: NEGATIVE
HCT VFR BLD AUTO: 31.8 % (ref 35–47)
HGB BLD-MCNC: 9.6 G/DL (ref 11.5–16)
HGB UR QL STRIP: NEGATIVE
KETONES UR QL STRIP.AUTO: NEGATIVE MG/DL
LEUKOCYTE ESTERASE UR QL STRIP.AUTO: NEGATIVE
LYMPHOCYTES # BLD AUTO: 23 % (ref 12–49)
LYMPHOCYTES # BLD: 1.6 K/UL (ref 0.8–3.5)
MCH RBC QN AUTO: 23.2 PG (ref 26–34)
MCHC RBC AUTO-ENTMCNC: 30.2 G/DL (ref 30–36.5)
MCV RBC AUTO: 76.8 FL (ref 80–99)
MONOCYTES # BLD: 0.6 K/UL (ref 0–1)
MONOCYTES NFR BLD AUTO: 8 % (ref 5–13)
NEUTS SEG # BLD: 4.8 K/UL (ref 1.8–8)
NEUTS SEG NFR BLD AUTO: 68 % (ref 32–75)
NITRITE UR QL STRIP.AUTO: NEGATIVE
PH UR STRIP: 7 [PH] (ref 5–8)
PLATELET # BLD AUTO: 241 K/UL (ref 150–400)
POTASSIUM SERPL-SCNC: 4 MMOL/L (ref 3.5–5.1)
PROT SERPL-MCNC: 6.5 G/DL (ref 6.4–8.2)
PROT UR STRIP-MCNC: NEGATIVE MG/DL
RBC # BLD AUTO: 4.14 M/UL (ref 3.8–5.2)
RBC #/AREA URNS HPF: ABNORMAL /HPF (ref 0–5)
SODIUM SERPL-SCNC: 139 MMOL/L (ref 136–145)
SP GR UR REFRACTOMETRY: 1.02 (ref 1–1.03)
UA: UC IF INDICATED,UAUC: ABNORMAL
UROBILINOGEN UR QL STRIP.AUTO: 0.2 EU/DL (ref 0.2–1)
WBC # BLD AUTO: 7.1 K/UL (ref 3.6–11)
WBC URNS QL MICRO: ABNORMAL /HPF (ref 0–4)

## 2017-06-15 PROCEDURE — 96361 HYDRATE IV INFUSION ADD-ON: CPT

## 2017-06-15 PROCEDURE — 99283 EMERGENCY DEPT VISIT LOW MDM: CPT

## 2017-06-15 PROCEDURE — 74176 CT ABD & PELVIS W/O CONTRAST: CPT

## 2017-06-15 PROCEDURE — 36415 COLL VENOUS BLD VENIPUNCTURE: CPT | Performed by: EMERGENCY MEDICINE

## 2017-06-15 PROCEDURE — 81025 URINE PREGNANCY TEST: CPT

## 2017-06-15 PROCEDURE — 85025 COMPLETE CBC W/AUTO DIFF WBC: CPT | Performed by: EMERGENCY MEDICINE

## 2017-06-15 PROCEDURE — 74011250636 HC RX REV CODE- 250/636: Performed by: EMERGENCY MEDICINE

## 2017-06-15 PROCEDURE — 81001 URINALYSIS AUTO W/SCOPE: CPT | Performed by: EMERGENCY MEDICINE

## 2017-06-15 PROCEDURE — 80053 COMPREHEN METABOLIC PANEL: CPT | Performed by: EMERGENCY MEDICINE

## 2017-06-15 PROCEDURE — 96374 THER/PROPH/DIAG INJ IV PUSH: CPT

## 2017-06-15 RX ORDER — SODIUM CHLORIDE 0.9 % (FLUSH) 0.9 %
5-10 SYRINGE (ML) INJECTION AS NEEDED
Status: DISCONTINUED | OUTPATIENT
Start: 2017-06-15 | End: 2017-06-15 | Stop reason: HOSPADM

## 2017-06-15 RX ORDER — HYDROCODONE BITARTRATE AND ACETAMINOPHEN 5; 325 MG/1; MG/1
1 TABLET ORAL
Qty: 15 TAB | Refills: 0 | Status: SHIPPED | OUTPATIENT
Start: 2017-06-15 | End: 2017-08-18

## 2017-06-15 RX ORDER — KETOROLAC TROMETHAMINE 30 MG/ML
30 INJECTION, SOLUTION INTRAMUSCULAR; INTRAVENOUS
Status: COMPLETED | OUTPATIENT
Start: 2017-06-15 | End: 2017-06-15

## 2017-06-15 RX ORDER — IBUPROFEN 600 MG/1
600 TABLET ORAL
Status: DISCONTINUED | OUTPATIENT
Start: 2017-06-15 | End: 2017-06-15

## 2017-06-15 RX ORDER — SODIUM CHLORIDE 0.9 % (FLUSH) 0.9 %
5-10 SYRINGE (ML) INJECTION EVERY 8 HOURS
Status: DISCONTINUED | OUTPATIENT
Start: 2017-06-15 | End: 2017-06-15 | Stop reason: HOSPADM

## 2017-06-15 RX ADMIN — SODIUM CHLORIDE 1000 ML: 900 INJECTION, SOLUTION INTRAVENOUS at 08:47

## 2017-06-15 RX ADMIN — KETOROLAC TROMETHAMINE 30 MG: 30 INJECTION, SOLUTION INTRAMUSCULAR at 08:46

## 2017-06-15 NOTE — ED TRIAGE NOTES
Lower abd pain/cramping with heavier periods x months, \"the pain usually goes away with my period but it lasted one day longer since my period stopped yesterday,\" saw OB/GYN yesterday and provider changed her birth control to address the complaints but \"the pain is still there\"  Back pain with urinary frequency x 1 week, denies known injury/trauma, no obvious deformities noted

## 2017-06-15 NOTE — DISCHARGE INSTRUCTIONS
Kidney Stone: Care Instructions  Your Care Instructions    Kidney stones are formed when salts, minerals, and other substances normally found in the urine clump together. They can be as small as grains of sand or, rarely, as large as golf balls. While the stone is traveling through the ureter, which is the tube that carries urine from the kidney to the bladder, you will probably feel pain. The pain may be mild or very severe. You may also have some blood in your urine. As soon as the stone reaches the bladder, any intense pain should go away. If a stone is too large to pass on its own, you may need a medical procedure to help you pass the stone. The doctor has checked you carefully, but problems can develop later. If you notice any problems or new symptoms, get medical treatment right away. Follow-up care is a key part of your treatment and safety. Be sure to make and go to all appointments, and call your doctor if you are having problems. It's also a good idea to know your test results and keep a list of the medicines you take. How can you care for yourself at home? · Drink plenty of fluids, enough so that your urine is light yellow or clear like water. If you have kidney, heart, or liver disease and have to limit fluids, talk with your doctor before you increase the amount of fluids you drink. · Take pain medicines exactly as directed. Call your doctor if you think you are having a problem with your medicine. ¨ If the doctor gave you a prescription medicine for pain, take it as prescribed. ¨ If you are not taking a prescription pain medicine, ask your doctor if you can take an over-the-counter medicine. Read and follow all instructions on the label. · Your doctor may ask you to strain your urine so that you can collect your kidney stone when it passes. You can use a kitchen strainer or a tea strainer to catch the stone. Store it in a plastic bag until you see your doctor again.   Preventing future kidney stones  Some changes in your diet may help prevent kidney stones. Depending on the cause of your stones, your doctor may recommend that you:  · Drink plenty of fluids, enough so that your urine is light yellow or clear like water. If you have kidney, heart, or liver disease and have to limit fluids, talk with your doctor before you increase the amount of fluids you drink. · Limit coffee, tea, and alcohol. Also avoid grapefruit juice. · Do not take more than the recommended daily dose of vitamins C and D.  · Avoid antacids such as Gaviscon, Maalox, Mylanta, or Tums. · Limit the amount of salt (sodium) in your diet. · Eat a balanced diet that is not too high in protein. · Limit foods that are high in a substance called oxalate, which can cause kidney stones. These foods include dark green vegetables, rhubarb, chocolate, wheat bran, nuts, cranberries, and beans. When should you call for help? Call your doctor now or seek immediate medical care if:  · You cannot keep down fluids. · Your pain gets worse. · You have a fever or chills. · You have new or worse pain in your back just below your rib cage (the flank area). · You have new or more blood in your urine. Watch closely for changes in your health, and be sure to contact your doctor if:  · You do not get better as expected. Where can you learn more? Go to http://kalia-everett.info/. Enter G881 in the search box to learn more about \"Kidney Stone: Care Instructions. \"  Current as of: November 20, 2015  Content Version: 11.2  © 3395-2735 [employer]. Care instructions adapted under license by [employer] (which disclaims liability or warranty for this information). If you have questions about a medical condition or this instruction, always ask your healthcare professional. [address] any warranty or liability for your use of this information.          Learning About Diet for Kidney Stone Prevention  What are kidney stones? Kidney stones are made of salts and minerals in the urine that form small \"vonda. \" Stones can form in the kidneys and the ureters (the tubes that lead from the kidneys to the bladder). They can also form in the bladder. Stones may not cause a problem as long as they stay in the kidneys. But they can cause sudden, severe pain. Pain is most likely when the stones travel from the kidneys to the bladder. Kidney stones can cause bloody urine. Kidney stones often run in families. You are more likely to get them if you don't drink enough fluids, mainly water. Certain foods and drinks and some dietary supplements may also increase your risk for kidney stones if you consume too much of them. What can you do to prevent kidney stones? Changing what you eat may not prevent all types of kidney stones. But for people who have a history of certain kinds of kidney stones, some changes in diet may help. A dietitian can help you set up a meal plan that includes healthy, low-oxalate choices. Here are some general guidelines to get you started. Plan your meals and snacks around foods that are low in oxalate. These foods include:  · Corn, kale, parsnips, and squash,. · Beef, chicken, pork, turkey, and fish. · Milk, butter, cheese, and yogurt. You can eat certain foods that are medium-high in oxalate, but eat them only once in a while. These foods include:  · Bread. · Brown rice. · English muffins. · Figs. · Popcorn. · String beans. · Tomatoes. Limit very high-oxalate foods, including:  · Black tea. · Coffee. · Chocolate. · Dark green vegetables. · Nuts. Here are some other things you can do to help prevent kidney stones. · Drink plenty of fluids. If you have kidney, heart, or liver disease and have to limit fluids, talk with your doctor before you increase the amount of fluids you drink.   · Do not take more than the recommended daily dose of vitamins C and D.  · Limit the salt in your diet. · Eat a balanced diet that is not too high in protein. Follow-up care is a key part of your treatment and safety. Be sure to make and go to all appointments, and call your doctor if you are having problems. It's also a good idea to know your test results and keep a list of the medicines you take. Where can you learn more? Go to http://kalia-everett.info/. Enter C138 in the search box to learn more about \"Learning About Diet for Kidney Stone Prevention. \"  Current as of: July 26, 2016  Content Version: 11.2  © 5292-8636 [employer]. Care instructions adapted under license by [employer] (which disclaims liability or warranty for this information). If you have questions about a medical condition or this instruction, always ask your healthcare professional. [address] any warranty or liability for your use of this information. Hip Pain: Care Instructions  Your Care Instructions  Hip pain may be caused by many things, including overuse, a fall, or a twisting movement. Another cause of hip pain is arthritis. Your pain may increase when you stand up, walk, or squat. The pain may come and go or may be constant. Home treatment can help relieve hip pain, swelling, and stiffness. If your pain is ongoing, you may need more tests and treatment. Follow-up care is a key part of your treatment and safety. Be sure to make and go to all appointments, and call your doctor if you are having problems. Its also a good idea to know your test results and keep a list of the medicines you take. How can you care for yourself at home? · Take pain medicines exactly as directed. ¨ If the doctor gave you a prescription medicine for pain, take it as prescribed. ¨ If you are not taking a prescription pain medicine, ask your doctor if you can take an over-the-counter medicine. · Rest and protect your hip.  Take a break from any activity, including standing or walking, that may cause pain. · Put ice or a cold pack against your hip for 10 to 20 minutes at a time. Try to do this every 1 to 2 hours for the next 3 days (when you are awake) or until the swelling goes down. Put a thin cloth between the ice and your skin. · Sleep on your healthy side with a pillow between your knees, or sleep on your back with pillows under your knees. · If there is no swelling, you can put moist heat, a heating pad, or a warm cloth on your hip. Do gentle stretching exercises to help keep your hip flexible. · Learn how to prevent falls. Have your vision and hearing checked regularly. Wear slippers or shoes with a nonskid sole. · Stay at a healthy weight. · Wear comfortable shoes. When should you call for help? Call 911 anytime you think you may need emergency care. For example, call if:  · You have sudden chest pain and shortness of breath, or you cough up blood. · You are not able to stand or walk or bear weight. · Your buttocks, legs, or feet feel numb or tingly. · Your leg or foot is cool or pale or changes color. · You have severe pain. Call your doctor now or seek immediate medical care if:  · You have signs of infection, such as:  ¨ Increased pain, swelling, warmth, or redness in the hip area. ¨ Red streaks leading from the hip area. ¨ Pus draining from the hip area. ¨ A fever. · You have signs of a blood clot, such as:  ¨ Pain in your calf, back of the knee, thigh, or groin. ¨ Redness and swelling in your leg or groin. · You are not able to bend, straighten, or move your leg normally. · You have trouble urinating or having bowel movements. Watch closely for changes in your health, and be sure to contact your doctor if:  · You do not get better as expected. Where can you learn more? Go to http://kalia-everett.info/. Enter O511 in the search box to learn more about \"Hip Pain: Care Instructions. \"  Current as of: May 27, 2016  Content Version: 11.2  © 5234-8169 [employer]. Care instructions adapted under license by [employer] (which disclaims liability or warranty for this information). If you have questions about a medical condition or this instruction, always ask your healthcare professional. [address] any warranty or liability for your use of this information. Sacroiliac Pain: Exercises  Your Care Instructions  Here are some examples of typical rehabilitation exercises for your condition. Start each exercise slowly. Ease off the exercise if you start to have pain. Your doctor or physical therapist will tell you when you can start these exercises and which ones will work best for you. How to do the exercises  Knee-to-chest stretch    Do not do the knee-to-chest exercise if it causes or increases back or leg pain. 1. Lie on your back with your knees bent and your feet flat on the floor. You can put a small pillow under your head and neck if it is more comfortable. 2. Grasp your hands under one knee and bring the knee to your chest, keeping the other foot flat on the floor. 3. Keep your lower back pressed to the floor. Hold for at least 15 to 30 seconds. 4. Relax and lower the knee to the starting position. Repeat with the other leg. 5. Repeat 2 to 4 times with each leg. 6. To get more stretch, keep your other leg flat on the floor while pulling your knee to your chest.  Bridging    1. Lie on your back with both knees bent. Your knees should be bent about 90 degrees. 2. Tighten your belly muscles by pulling in your belly button toward your spine. Then push your feet into the floor, squeeze your buttocks, and lift your hips off the floor until your shoulders, hips, and knees are all in a straight line. 3. Hold for about 6 seconds as you continue to breathe normally, and then slowly lower your hips back down to the floor and rest for up to 10 seconds. 4. Repeat 8 to 12 times.   Hip extension    1. Get down on your hands and knees on the floor. 2. Keeping your back and neck straight, lift one leg straight out behind you. When you lift your leg, keep your hips level. Don't let your back twist, and don't let your hip drop toward the floor. 3. Hold for 6 seconds. Repeat 8 to 12 times with each leg. 4. If you feel steady and strong when you do this exercise, you can make it more difficult. To do this, when you lift your leg, also lift the opposite arm straight out in front of you. For example, lift the left leg and the right arm at the same time. (This is sometimes called the \"bird dog exercise. \") Hold for 6 seconds, and repeat 8 to 12 times on each side. Clamshell    1. Lie on your side with a pillow under your head. Keep your feet and knees together and your knees bent. 2. Raise your top knee, but keep your feet together. Do not let your hips roll back. Your legs should open up like a clamshell. 3. Hold for 6 seconds. 4. Slowly lower your knee back down. Rest for 10 seconds. 5. Repeat 8 to 12 times. 6. Switch to your other side and repeat steps 1 through 5. Hamstring wall stretch    1. Lie on your back in a doorway, with one leg through the open door. 2. Slide your affected leg up the wall to straighten your knee. You should feel a gentle stretch down the back of your leg. ¨ Do not arch your back. ¨ Do not bend either knee. ¨ Keep one heel touching the floor and the other heel touching the wall. Do not point your toes. 3. Hold the stretch for at least 1 minute to begin. Then try to lengthen the time you hold the stretch to as long as 6 minutes. 4. Switch legs, and repeat steps 1 through 3.  5. Repeat 2 to 4 times. If you do not have a place to do this exercise in a doorway, there is another way to do it:  1. Lie on your back, and bend one knee. 2. Loop a towel under the ball and toes of that foot, and hold the ends of the towel in your hands.   3. Straighten your knee, and slowly pull back on the towel. You should feel a gentle stretch down the back of your leg. 4. Switch legs, and repeat steps 1 through 3.  5. Repeat 2 to 4 times. Lower abdominal strengthening    1. Lie on your back with your knees bent and your feet flat on the floor. 2. Tighten your belly muscles by pulling your belly button in toward your spine. 3. Lift one foot off the floor and bring your knee toward your chest, so that your knee is straight above your hip and your leg is bent like the letter \"L. \"  4. Lift the other knee up to the same position. 5. Lower one leg at a time to the starting position. 6. Keep alternating legs until you have lifted each leg 8 to 12 times. 7. Be sure to keep your belly muscles tight and your back still as you are moving your legs. Be sure to breathe normally. Piriformis stretch    1. Lie on your back with your legs straight. 2. Lift your affected leg, and bend your knee. With your opposite hand, reach across your body, and then gently pull your knee toward your opposite shoulder. 3. Hold the stretch for 15 to 30 seconds. 4. Switch legs and repeat steps 1 through 3.  5. Repeat 2 to 4 times. Follow-up care is a key part of your treatment and safety. Be sure to make and go to all appointments, and call your doctor if you are having problems. It's also a good idea to know your test results and keep a list of the medicines you take. Where can you learn more? Go to http://kalia-everett.info/. Enter D464 in the search box to learn more about \"Sacroiliac Pain: Exercises. \"  Current as of: May 23, 2016  Content Version: 11.2  © 7232-9258 [employer]. Care instructions adapted under license by [employer] (which disclaims liability or warranty for this information).  If you have questions about a medical condition or this instruction, always ask your healthcare professional. [facility] [address] any warranty or liability for your use of this information.

## 2017-06-15 NOTE — ED NOTES
Discharge instructions and 2 prescriptions reviewed with patient by Dr. Maru Andino. Patient alert and oriented and ambulatory out of ED in no apparent distress. Patient declined wheelchair.

## 2017-06-15 NOTE — ED NOTES
Patient resting comfortably in stretcher. Call bell within reach. No further needs expressed at this time.

## 2017-06-15 NOTE — LETTER
[facility] EMERGENCY DEPT 
[address] [facility] [address] [id number] [phone] Work/School Note Date: 6/15/2017 To Whom It May concern: 
 
Sherrie Mccoy was seen and treated today in the emergency room by the following provider(s): 
Attending Provider: Lyn Torres MD. Sherrie Mccoy may return to work on 06/19/2017. Sincerely, Iraida Thao MD

## 2017-06-15 NOTE — ED PROVIDER NOTES
HPI Comments: Bg Jang is a 23 y.o. female with presenting ambulatory to the ED with c/o lower abdominal pain \"cramping\" x 1 day. She notes associated symptom of lower back pain. Pt states her menstrual cycle ended yesterday, however she is continuing to have cramps. She specifically denies any fevers, chills, nausea, vomiting, chest pain, shortness of breath, headache, rash, diarrhea, vaginal discharge, recent sick contact, sweating or weight loss. PCP: Rashel Hebert MD  Social Hx: +tobacco (0.25 ppd), +EtOH (socially)    There are no other complaints, changes or physical findings at this time. Written by Jo Arreaga ED Scribe, as dictated by Karen Shetty. Iker Zarate MD      The history is provided by the patient. No  was used. Past Medical History:   Diagnosis Date    Abnormal Pap smear     Anemia NEC     per pt takes iron PO    Asthma     bronchitis    Breastfeeding (infant)     Complication of anesthesia     never had    HX OTHER MEDICAL     Pregnant       Past Surgical History:   Procedure Laterality Date    HX GYN      c section         Family History:   Problem Relation Age of Onset    Cancer Maternal Grandfather     Diabetes Paternal Grandmother        Social History     Social History    Marital status: SINGLE     Spouse name: N/A    Number of children: N/A    Years of education: N/A     Occupational History    Not on file. Social History Main Topics    Smoking status: Current Every Day Smoker     Packs/day: 0.25    Smokeless tobacco: Former User     Quit date: 8/26/2015      Comment: black and milds    Alcohol use Yes      Comment: socially    Drug use: No    Sexual activity: Yes     Partners: Male     Birth control/ protection: Condom, None     Other Topics Concern    Not on file     Social History Narrative         ALLERGIES: Review of patient's allergies indicates no known allergies.     Review of Systems Constitutional: Negative. Negative for activity change, appetite change, chills, fatigue, fever and unexpected weight change. HENT: Negative. Negative for congestion, hearing loss, rhinorrhea, sneezing and voice change. Eyes: Negative. Negative for pain and visual disturbance. Respiratory: Negative. Negative for apnea, cough, choking, chest tightness and shortness of breath. Cardiovascular: Negative. Negative for chest pain and palpitations. Gastrointestinal: Positive for abdominal pain (lower). Negative for abdominal distention, blood in stool, diarrhea, nausea and vomiting. Genitourinary: Negative. Negative for difficulty urinating, flank pain, frequency, urgency and vaginal discharge. No discharge   Musculoskeletal: Positive for back pain (lower). Negative for arthralgias, myalgias and neck stiffness. Skin: Negative. Negative for color change and rash. Neurological: Negative. Negative for dizziness, seizures, syncope, speech difficulty, weakness, numbness and headaches. Hematological: Negative for adenopathy. Psychiatric/Behavioral: Negative. Negative for agitation, behavioral problems, dysphoric mood and suicidal ideas. The patient is not nervous/anxious.         Vitals:    06/15/17 0804 06/15/17 0946   BP: 121/62 118/67   Pulse: 87 70   Resp: 18 18   Temp: 98.4 °F (36.9 °C)    SpO2: 99% 100%   Weight: 98.9 kg (218 lb)    Height: 5' 5\" (1.651 m)             Physical Exam   Physical Examination: General appearance - WDWN, in no apparent distress  Head - NC/AT  Eyes - pupils equal, round  and reactive, extraocular eye movements intact, conj/sclera clear, anicteric  Mouth - mucous membranes moist, pharynx normal without lesions  Nose/Ears - nares clear, Tms & canals clear  Neck - supple, no significant adenopathy, trachea midline, no crepitus, c spine diffusely non-tender, no step offs  Chest - Normal respiratory effort, clear to auscultation bilaterally, no wheezes/rales/rhonchi  Heart - normal rate and regular rhythm, S1 and S2 normal, no murmurs, gallops, or rubs  Abdomen - soft, suprapubic tenderness, nondistended, nabs, no masses, guarding, rebound or rigidity  Neurological - alert, oriented, normal speech, cranial nerves intact, no focal motor findings, motor & sensory diffusely intact, normal gait  Extremities/MS - peripheral pulses normal, no pedal edema, all joints atraumatic, FROM, non-tender, no gross deformities, spine diffusely non-tender  Skin - normal coloration and turgor, no rashes, no lesions or lacerations      MDM  Number of Diagnoses or Management Options  Diagnosis management comments:       DDx: UTI, back stream pregnancy, renal colic, appendicitis        Amount and/or Complexity of Data Reviewed  Clinical lab tests: ordered and reviewed    Patient Progress  Patient progress: stable    ED Course       Procedures      LABORATORY TESTS:  Recent Results (from the past 12 hour(s))   URINALYSIS W/ REFLEX CULTURE    Collection Time: 06/15/17  8:13 AM   Result Value Ref Range    Color YELLOW/STRAW      Appearance CLOUDY (A) CLEAR      Specific gravity 1.025 1.003 - 1.030      pH (UA) 7.0 5.0 - 8.0      Protein NEGATIVE  NEG mg/dL    Glucose NEGATIVE  NEG mg/dL    Ketone NEGATIVE  NEG mg/dL    Bilirubin NEGATIVE  NEG      Blood NEGATIVE  NEG      Urobilinogen 0.2 0.2 - 1.0 EU/dL    Nitrites NEGATIVE  NEG      Leukocyte Esterase NEGATIVE  NEG      WBC 0-4 0 - 4 /hpf    RBC 0-5 0 - 5 /hpf    Epithelial cells FEW FEW /lpf    Bacteria NEGATIVE  NEG /hpf    UA:UC IF INDICATED CULTURE NOT INDICATED BY UA RESULT CNI      Amorphous Crystals FEW (A) NEG     HCG URINE, QL. - POC    Collection Time: 06/15/17  8:16 AM   Result Value Ref Range    Pregnancy test,urine (POC) NEGATIVE  NEG     CBC WITH AUTOMATED DIFF    Collection Time: 06/15/17  8:39 AM   Result Value Ref Range    WBC 7.1 3.6 - 11.0 K/uL    RBC 4.14 3.80 - 5.20 M/uL    HGB 9.6 (L) 11.5 - 16.0 g/dL HCT 31.8 (L) 35.0 - 47.0 %    MCV 76.8 (L) 80.0 - 99.0 FL    MCH 23.2 (L) 26.0 - 34.0 PG    MCHC 30.2 30.0 - 36.5 g/dL    RDW 15.3 (H) 11.5 - 14.5 %    PLATELET 709 277 - 026 K/uL    NEUTROPHILS 68 32 - 75 %    LYMPHOCYTES 23 12 - 49 %    MONOCYTES 8 5 - 13 %    EOSINOPHILS 1 0 - 7 %    BASOPHILS 0 0 - 1 %    ABS. NEUTROPHILS 4.8 1.8 - 8.0 K/UL    ABS. LYMPHOCYTES 1.6 0.8 - 3.5 K/UL    ABS. MONOCYTES 0.6 0.0 - 1.0 K/UL    ABS. EOSINOPHILS 0.1 0.0 - 0.4 K/UL    ABS. BASOPHILS 0.0 0.0 - 0.1 K/UL   METABOLIC PANEL, COMPREHENSIVE    Collection Time: 06/15/17  8:39 AM   Result Value Ref Range    Sodium 139 136 - 145 mmol/L    Potassium 4.0 3.5 - 5.1 mmol/L    Chloride 108 97 - 108 mmol/L    CO2 26 21 - 32 mmol/L    Anion gap 5 5 - 15 mmol/L    Glucose 89 65 - 100 mg/dL    BUN 11 6 - 20 MG/DL    Creatinine 0.64 0.55 - 1.02 MG/DL    BUN/Creatinine ratio 17 12 - 20      GFR est AA >60 >60 ml/min/1.73m2    GFR est non-AA >60 >60 ml/min/1.73m2    Calcium 8.0 (L) 8.5 - 10.1 MG/DL    Bilirubin, total 0.2 0.2 - 1.0 MG/DL    ALT (SGPT) 15 12 - 78 U/L    AST (SGOT) 8 (L) 15 - 37 U/L    Alk. phosphatase 80 45 - 117 U/L    Protein, total 6.5 6.4 - 8.2 g/dL    Albumin 3.3 (L) 3.5 - 5.0 g/dL    Globulin 3.2 2.0 - 4.0 g/dL    A-G Ratio 1.0 (L) 1.1 - 2.2         IMAGING RESULTS:  EXAM: CT ABD PELV WO CONT     INDICATION: Right flank pain. Urinary frequency for one week.     COMPARISON: CT chest 4/16/2017.     TECHNIQUE: Helical CT of the abdomen and pelvis without contrast. Coronal and  sagittal reformats are performed. CT dose reduction was achieved through use of  a standardized protocol tailored for this examination and automatic exposure  control for dose modulation.     FINDINGS:   Solid organ evaluation is limited without contrast.      The visualized lung bases demonstrate no mass or consolidation.  The heart size  is normal. There is no pericardial or pleural effusion.     There may be 1 or 2 punctate nonobstructing calculi in the lower right kidney. The kidneys are symmetric in size without hydronephrosis. There is no  perinephric fluid or stranding.     The liver, spleen, pancreas, and adrenal glands are normal. The gall bladder is  present without intra- or extra-hepatic biliary dilatation.      There are no dilated bowel loops. The appendix is normal. There are no  enlarged lymph nodes. There is no free fluid or free air. The aorta tapers  without aneurysm.     The urinary bladder is minimally distended and unremarkable. There is no pelvic  mass.      There is nonspecific mild sclerosis adjacent to the right sacroiliac joint. There is no aggressive blastic or lytic bony lesion.     IMPRESSION  IMPRESSION:   1. Possible 1 or 2 punctate nonobstructing calculi in the right kidney. No  hydronephrosis. No other acute abnormality is seen in the abdomen or pelvis.     2. Nonspecific mild sclerosis adjacent to the right sacroiliac joint.       MEDICATIONS GIVEN:  Medications   sodium chloride (NS) flush 5-10 mL (not administered)   sodium chloride (NS) flush 5-10 mL (not administered)   sodium chloride 0.9 % bolus infusion 1,000 mL (1,000 mL IntraVENous New Bag 6/15/17 4880)   ketorolac (TORADOL) injection 30 mg (30 mg IntraVENous Given 6/15/17 1062)       IMPRESSION:  1. Kidney stone    2. Sacroiliac joint disease        PLAN:  1. Current Discharge Medication List      START taking these medications    Details   prenatal multivit-ca-min-fe-fa (PRENATAL VITAMIN) tab Take 1 Tab by mouth daily. Qty: 30 Tab, Refills: 0      HYDROcodone-acetaminophen (NORCO) 5-325 mg per tablet Take 1 Tab by mouth every six (6) hours as needed for Pain. Max Daily Amount: 4 Tabs. Qty: 15 Tab, Refills: 0           2.    Follow-up Information     Follow up With Details Comments Contact Gemini Johansen MD Schedule an appointment as soon as possible for a visit in 1 day  [address] [facility] [address]      Lakshmi Castaneda., MD [place] kidney stone 24 hour hotline [facility]  [address]  [address] [facility] EMERGENCY DEPT  As needed, If symptoms worsen [address]        Return to ED if worse     DISCHARGE NOTE  9:54 AM  The patient has been re-evaluated and is ready for discharge. Reviewed available results with patient. Counseled pt on diagnosis and care plan. Pt has expressed understanding, and all questions have been answered. Pt agrees with plan and agrees to F/U as recommended, or return to the ED if their sxs worsen. Discharge instructions have been provided and explained to the pt, along with reasons to return to the ED. Written by Timothy Soriano, ED Scribe, as dictated by Lorna Busby. Gerldine Schlatter, MD.        This note is prepared by Timothy Soriano, acting as Scribe for Lorna Busby. Gerldine Schlatter, [address] [place] [address]. Gerldine Schlatter, MD : The scribe's documentation has been prepared under my direction and personally reviewed by me in its entirety. I confirm that the note above accurately reflects all work, treatment, procedures, and medical decision making performed by me.

## 2017-06-15 NOTE — ED NOTES
Patient seen at [facility] [place] office yesterday for painful menstrual cramping associated with menstrual cycle. Patient uses Lian Chapman

## 2017-08-18 ENCOUNTER — HOSPITAL ENCOUNTER (EMERGENCY)
Age: 27
Discharge: HOME | End: 2017-08-18
Attending: EMERGENCY MEDICINE
Payer: MEDICAID

## 2017-08-18 VITALS
HEIGHT: 65 IN | SYSTOLIC BLOOD PRESSURE: 110 MMHG | WEIGHT: 230 LBS | BODY MASS INDEX: 38.32 KG/M2 | HEART RATE: 87 BPM | DIASTOLIC BLOOD PRESSURE: 69 MMHG | TEMPERATURE: 98.9 F | OXYGEN SATURATION: 98 % | RESPIRATION RATE: 16 BRPM

## 2017-08-18 DIAGNOSIS — K08.89 PAIN, DENTAL: Primary | ICD-10-CM

## 2017-08-18 PROCEDURE — 74011000250 HC RX REV CODE- 250: Performed by: PHYSICIAN ASSISTANT

## 2017-08-18 PROCEDURE — 74011250637 HC RX REV CODE- 250/637: Performed by: PHYSICIAN ASSISTANT

## 2017-08-18 PROCEDURE — 99283 EMERGENCY DEPT VISIT LOW MDM: CPT

## 2017-08-18 RX ORDER — TRAMADOL HYDROCHLORIDE 50 MG/1
50 TABLET ORAL
Qty: 15 TAB | Refills: 0 | Status: SHIPPED | OUTPATIENT
Start: 2017-08-18 | End: 2017-08-18

## 2017-08-18 RX ORDER — NAPROXEN 500 MG/1
500 TABLET ORAL
Qty: 20 TAB | Refills: 0 | Status: SHIPPED | OUTPATIENT
Start: 2017-08-18 | End: 2017-08-28

## 2017-08-18 RX ORDER — TRAMADOL HYDROCHLORIDE 50 MG/1
50 TABLET ORAL
Qty: 15 TAB | Refills: 0 | Status: SHIPPED | OUTPATIENT
Start: 2017-08-18 | End: 2017-12-07

## 2017-08-18 RX ADMIN — LIDOCAINE HYDROCHLORIDE: 20 SOLUTION ORAL; TOPICAL at 11:52

## 2017-08-18 NOTE — ED NOTES
Discharge instructions were given to the patient by TEDDY Mario RN. .     The patient left the Emergency Department ambulatory, alert and oriented and in no acute distress with 2 prescription(s). The patient was encouraged to call or return to the ED for worsening symptoms or problems and was encouraged to schedule a follow up appointment for continuing care. Patient leaving ED accompanied by self. The patient verbalized understanding of discharge instructions and prescriptions, all questions were answered. The patient has no further concerns at this time. Patient refused wheelchair transfer upon ED discharge.

## 2017-08-18 NOTE — DISCHARGE INSTRUCTIONS
Tooth and Gum Pain: Care Instructions  Your Care Instructions    The most common causes of dental pain are tooth decay and gum disease. Pain can also be caused by an infection of the tooth (abscess) or the gums. Or you may have pain from a broken or cracked tooth. Other causes of pain include infection and damage to a tooth from nervous grinding of your teeth. A wisdom tooth can be painful when it is coming in but cannot break through the gum. It can also be painful when the tooth is only partway in and extra gum tissue has formed around it. The tissue can get inflamed (pericoronitis), and sometimes it gets infected. Prompt dental care can help find the cause of your toothache and keep the tooth from dying or gum disease from getting worse. Self-care at home may reduce your pain and discomfort. Follow-up care is a key part of your treatment and safety. Be sure to make and go to all appointments, and call your dentist or doctor if you are having problems. It's also a good idea to know your test results and keep a list of the medicines you take. How can you care for yourself at home? · To reduce pain and facial swelling, put an ice or cold pack on the outside of your cheek for 10 to 20 minutes at a time. Put a thin cloth between the ice and your skin. Do not use heat. · If your doctor prescribed antibiotics, take them as directed. Do not stop taking them just because you feel better. You need to take the full course of antibiotics. · Ask your doctor if you can take an over-the-counter pain medicine, such as acetaminophen (Tylenol), ibuprofen (Advil, Motrin), or naproxen (Aleve). Be safe with medicines. Read and follow all instructions on the label. · Avoid very hot, cold, or sweet foods and drinks if they increase your pain. · Rinse your mouth with warm salt water every 2 hours to help relieve pain and swelling. Mix 1 teaspoon of salt in 8 ounces of water.   · Talk to your dentist about using special toothpaste for sensitive teeth. To reduce pain on contact with heat or cold or when brushing, brush with this toothpaste regularly or rub a small amount of the paste on the sensitive area with a clean finger 2 or 3 times a day. Floss gently between your teeth. · Do not smoke or use spit tobacco. Tobacco use can make gum problems worse, decreases your ability to fight infection in your gums, and delays healing. If you need help quitting, talk to your doctor about stop-smoking programs and medicines. These can increase your chances of quitting for good. When should you call for help? Call 911 anytime you think you may need emergency care. For example, call if:  · You have trouble breathing. Call your dentist or doctor now or seek immediate medical care if:  · You have signs of infection, such as:  ¨ Increased pain, swelling, warmth, or redness. ¨ Red streaks leading from the area. ¨ Pus draining from the area. ¨ A fever. Watch closely for changes in your health, and be sure to contact your doctor if:  · You do not get better as expected. Where can you learn more? Go to http://kalia-everett.info/. Enter [id number] [id number] in the search box to learn more about \"Tooth and Gum Pain: Care Instructions. \"  Current as of: August 11, 2016  Content Version: 11.3  © 6369-4339 [employer]. Care instructions adapted under license by [employer] (which disclaims liability or warranty for this information). If you have questions about a medical condition or this instruction, always ask your healthcare professional. [place] [address] any warranty or liability for your use of this information.

## 2017-08-18 NOTE — LETTER
[facility] EMERGENCY DEPT 
[address] [facility] [address] [address] 
[phone] Work/School Note Date: 8/18/2017 To Whom It May concern: 
 
Margarita Parra was seen and treated today in the emergency room by the following provider(s): 
Attending Provider: Bushra Lynch MD 
Physician Assistant: EMILIO Alvarado. Please excuse Ms. Lay from work 8/1/2017. Margarita Parra may return to work on 8/19/2017. Sincerely, EMILIO Alvarado

## 2017-08-18 NOTE — ED PROVIDER NOTES
Patient is a 33 y.o. female presenting with dental problem. The history is provided by the patient. Dental Pain    This is a new problem. The problem occurs constantly. The problem has been gradually worsening. The pain is located in the left upper mouth. The quality of the pain is aching and sharp. Pain scale: moderate to severe when sharp pains \"hit me when the air or cold hits the tooth\" The pain is moderate. Associated symptoms include headaches. There was no vomiting, no nausea, no fever, no swelling, no chest pain, no shortness of breath and no drainage. Treatments tried: Oragel and OTC tylenol. The treatment provided mild relief. The patient has no cardiac history. Past Medical History:   Diagnosis Date    Abnormal Pap smear     Anemia NEC     per pt takes iron PO    Asthma     bronchitis    Breastfeeding (infant)     Complication of anesthesia     never had    HX OTHER MEDICAL     Pregnant       Past Surgical History:   Procedure Laterality Date    HX GYN      c section         Family History:   Problem Relation Age of Onset    Cancer Maternal Grandfather     Diabetes Paternal Grandmother        Social History     Social History    Marital status: SINGLE     Spouse name: N/A    Number of children: N/A    Years of education: N/A     Occupational History    Not on file. Social History Main Topics    Smoking status: Current Every Day Smoker     Packs/day: 0.25    Smokeless tobacco: Former User     Quit date: 8/26/2015      Comment: black and milds    Alcohol use Yes      Comment: socially    Drug use: No    Sexual activity: Yes     Partners: Male     Birth control/ protection: Condom, None     Other Topics Concern    Not on file     Social History Narrative         ALLERGIES: Review of patient's allergies indicates no known allergies. Review of Systems   Constitutional: Negative for chills and fever. HENT: Positive for dental problem.  Negative for congestion, hearing loss, rhinorrhea and sore throat. Eyes: Negative for pain and discharge. Respiratory: Negative for cough and shortness of breath. Cardiovascular: Negative for chest pain and palpitations. Gastrointestinal: Negative for abdominal pain, nausea and vomiting. Musculoskeletal: Negative for back pain and neck pain. Skin: Negative for rash and wound. Neurological: Positive for headaches. Negative for seizures and syncope. All other systems reviewed and are negative. Vitals:    08/18/17 1105   BP: 110/69   Pulse: 87   Resp: 16   Temp: 98.9 °F (37.2 °C)   SpO2: 98%   Weight: 104.3 kg (230 lb)   Height: 5' 5\" (1.651 m)            Physical Exam   Constitutional: She is oriented to person, place, and time. She appears well-developed and well-nourished. No distress. HENT:   Head: Normocephalic. Right Ear: External ear normal.   Left Ear: External ear normal.   Mouth/Throat: Oropharynx is clear and moist.   Left rear molars with ttp. No significant caries. Minimal gum swelling. No fluctuance. No drainage. No erythema. No facial swelling. Eyes: Pupils are equal, round, and reactive to light. Right eye exhibits no discharge. Left eye exhibits no discharge. Neck: Normal range of motion. Neck supple. Cardiovascular: Normal rate. Exam reveals no friction rub. No murmur heard. Pulmonary/Chest: Effort normal. She has no rales. Lymphadenopathy:     She has no cervical adenopathy. Neurological: She is alert and oriented to person, place, and time. Skin: Skin is warm and dry. She is not diaphoretic. Psychiatric: She has a normal mood and affect. Her behavior is normal.   Nursing note and vitals reviewed.        MDM  Number of Diagnoses or Management Options  Pain, dental:   Diagnosis management comments: DDX:  Dental Caries, Dental Abscess, FX toot, Dental pain      ED Course       Procedures        LABORATORY TESTS:  No results found for this or any previous visit (from the past 12 hour(s)). IMAGING RESULTS:  No orders to display       MEDICATIONS GIVEN:  Medications   dental ball (lidocaine/Benadryl/Cetacaine) mixture ( Mucous Membrane Given 8/18/17 1152)       IMPRESSION:  1. Pain, dental        PLAN:  1. Discharge Medication List as of 8/18/2017 11:45 AM      START taking these medications    Details   naproxen (NAPROSYN) 500 mg tablet Take 1 Tab by mouth two (2) times daily as needed for Pain for up to 10 days. , Normal, Disp-20 Tab, R-0      traMADol (ULTRAM) 50 mg tablet Take 1 Tab by mouth every six (6) hours as needed for Pain. Max Daily Amount: 200 mg., Print, Disp-15 Tab, R-0         CONTINUE these medications which have NOT CHANGED    Details   albuterol (PROVENTIL VENTOLIN) 2.5 mg /3 mL (0.083 %) nebulizer solution 3 mL by Nebulization route every four (4) hours as needed for Wheezing., Normal, Disp-24 Each, R-1      Nebulizer & Compressor machine UAD, Normal, Disp-1 Each, R-0      ethinyl estradiol-etonogestrel (NUVARING) 0.12-0.015 mg/24 hr vaginal ring by Intravaginal route., Historical Med           2. Follow-up Information     Follow up With Details Comments [facility] [address]  [address]  [phone]      follow up with one of the Dental Clinics - list provided.  Call today for an appointment - or you can walk in to several locations, see list.            Return to ED if worse

## 2017-08-18 NOTE — ED NOTES
Emergency Department Nursing Plan of Care       The Nursing Plan of Care is developed from the Nursing assessment and Emergency Department Attending provider initial evaluation. The plan of care may be reviewed in the ED Provider note. The Plan of Care was developed with the following considerations:   Patient / Family readiness to learn indicated by:verbalized understanding  Persons(s) to be included in education: patient  Barriers to Learning/Limitations:No    Signed     Jennifer Huston    8/18/2017   11:31 AM    See nursing assessment    Patient is alert and oriented x 4 and in no acute distress at this time. Respirations are at a regular rate, depth, and pattern. Patient updated on plan of care and has no questions or concerns at this time.

## 2017-09-06 ENCOUNTER — APPOINTMENT (OUTPATIENT)
Dept: GENERAL RADIOLOGY | Age: 27
End: 2017-09-06
Attending: PHYSICIAN ASSISTANT
Payer: MEDICAID

## 2017-09-06 ENCOUNTER — HOSPITAL ENCOUNTER (EMERGENCY)
Age: 27
Discharge: HOME | End: 2017-09-06
Attending: EMERGENCY MEDICINE | Admitting: EMERGENCY MEDICINE
Payer: MEDICAID

## 2017-09-06 VITALS
BODY MASS INDEX: 33.75 KG/M2 | TEMPERATURE: 98.6 F | RESPIRATION RATE: 19 BRPM | OXYGEN SATURATION: 99 % | WEIGHT: 210 LBS | SYSTOLIC BLOOD PRESSURE: 122 MMHG | HEART RATE: 88 BPM | DIASTOLIC BLOOD PRESSURE: 61 MMHG | HEIGHT: 66 IN

## 2017-09-06 DIAGNOSIS — J06.9 ACUTE UPPER RESPIRATORY INFECTION: Primary | ICD-10-CM

## 2017-09-06 LAB
DEPRECATED S PYO AG THROAT QL EIA: NEGATIVE
HCG UR QL: NEGATIVE

## 2017-09-06 PROCEDURE — 87070 CULTURE OTHR SPECIMN AEROBIC: CPT | Performed by: EMERGENCY MEDICINE

## 2017-09-06 PROCEDURE — 87880 STREP A ASSAY W/OPTIC: CPT | Performed by: PHYSICIAN ASSISTANT

## 2017-09-06 PROCEDURE — 99283 EMERGENCY DEPT VISIT LOW MDM: CPT

## 2017-09-06 PROCEDURE — 71020 XR CHEST PA LAT: CPT

## 2017-09-06 PROCEDURE — 81025 URINE PREGNANCY TEST: CPT

## 2017-09-06 RX ORDER — NAPROXEN 500 MG/1
500 TABLET ORAL 2 TIMES DAILY WITH MEALS
Qty: 20 TAB | Refills: 0 | Status: SHIPPED | OUTPATIENT
Start: 2017-09-06 | End: 2017-12-07

## 2017-09-06 NOTE — ED NOTES

## 2017-09-06 NOTE — LETTER
[facility] EMERGENCY DEPT 
[address] [facility] [address] [address] 
[phone] Work/School Note Date: 9/6/2017 To Whom It May concern: Sherry John was seen and treated today in the emergency room by the following provider(s): 
Attending Provider: Char Wood MD 
Physician Assistant: Shorty Saini. Sherry John may return to work on 9/8/2017. Sincerely, EMILIO Saini

## 2017-09-06 NOTE — ED PROVIDER NOTES
Patient is a 33 y.o. female presenting with cough. The history is provided by the patient. Cough   This is a new problem. Episode onset: Pt reports producitve cough, congestion, sore throat, and myalgias x 4 days. Denies fever/chills, ear pain. Denies sick contacts, N/V. The cough is productive of sputum. There has been no fever. Associated symptoms include sore throat and myalgias. Pertinent negatives include no chest pain, no chills, no sweats, no eye redness, no ear congestion, no ear pain, no headaches, no rhinorrhea, no shortness of breath, no wheezing, no nausea and no vomiting. She has tried nothing for the symptoms. She is a smoker. Her past medical history does not include asthma. Past Medical History:   Diagnosis Date    Abnormal Pap smear     Anemia NEC     per pt takes iron PO    Asthma     bronchitis    Breastfeeding (infant)     Complication of anesthesia     never had    HX OTHER MEDICAL     Pregnant       Past Surgical History:   Procedure Laterality Date    HX GYN      c section         Family History:   Problem Relation Age of Onset    Cancer Maternal Grandfather     Diabetes Paternal Grandmother        Social History     Social History    Marital status: SINGLE     Spouse name: N/A    Number of children: N/A    Years of education: N/A     Occupational History    Not on file. Social History Main Topics    Smoking status: Current Every Day Smoker     Packs/day: 0.25    Smokeless tobacco: Former User     Quit date: 8/26/2015      Comment: black and milds    Alcohol use Yes      Comment: socially    Drug use: No    Sexual activity: Yes     Partners: Male     Birth control/ protection: Condom, None     Other Topics Concern    Not on file     Social History Narrative         ALLERGIES: Review of patient's allergies indicates no known allergies. Review of Systems   Constitutional: Negative for activity change, appetite change, chills and fever.    HENT: Positive for congestion and sore throat. Negative for ear discharge, ear pain, facial swelling, rhinorrhea, sinus pressure and sneezing. Eyes: Negative for redness. Respiratory: Positive for cough. Negative for shortness of breath and wheezing. Cardiovascular: Negative for chest pain. Gastrointestinal: Negative for abdominal pain, constipation, diarrhea, nausea and vomiting. Musculoskeletal: Positive for myalgias. Negative for arthralgias, back pain, gait problem and neck pain. Skin: Negative for rash. Neurological: Negative for dizziness, weakness, light-headedness and headaches. All other systems reviewed and are negative. Vitals:    09/06/17 1201   BP: 122/61   Pulse: 88   Resp: 19   Temp: 98.6 °F (37 °C)   SpO2: 99%   Weight: 95.3 kg (210 lb)   Height: 5' 6\" (1.676 m)            Physical Exam   Constitutional: She is oriented to person, place, and time. She appears well-developed and well-nourished. No distress. HENT:   Head: Normocephalic and atraumatic. Right Ear: Tympanic membrane, external ear and ear canal normal.   Left Ear: Tympanic membrane, external ear and ear canal normal.   Nose: Mucosal edema present. No rhinorrhea. Right sinus exhibits no maxillary sinus tenderness and no frontal sinus tenderness. Left sinus exhibits no maxillary sinus tenderness and no frontal sinus tenderness. Mouth/Throat: Uvula is midline. No trismus in the jaw. No uvula swelling. Posterior oropharyngeal erythema present. No oropharyngeal exudate, posterior oropharyngeal edema or tonsillar abscesses. Tonsils 2+ with erythema. No exudate. Eyes: Conjunctivae are normal.   Cardiovascular: Normal rate, regular rhythm and normal heart sounds. Pulmonary/Chest: Effort normal and breath sounds normal. No respiratory distress. Abdominal: Soft. Bowel sounds are normal. She exhibits no distension. Musculoskeletal: Normal range of motion. Neurological: She is alert and oriented to person, place, and time. Skin: Skin is warm. No rash noted. No erythema. Psychiatric: She has a normal mood and affect. Her behavior is normal.   Nursing note and vitals reviewed. MDM  Number of Diagnoses or Management Options  Diagnosis management comments: DDx: Pneumonia, Bronchitis, URI, Tonsillitis, Pharyngitis, Sinusitis        Amount and/or Complexity of Data Reviewed  Clinical lab tests: ordered and reviewed  Tests in the radiology section of CPT®: ordered and reviewed      ED Course       Procedures      Discussed lab results and imaging with pt. Discussed that this is most likely viral in origin, so no abx was prescribed. Discussed importance of PCP follow up. Return if sx worsen. All questions answered. Pt voiced she understood. LABORATORY TESTS:  Recent Results (from the past 12 hour(s))   STREP AG SCREEN, GROUP A    Collection Time: 09/06/17 12:29 PM   Result Value Ref Range    Group A Strep Ag ID NEGATIVE  NEG     HCG URINE, QL. - POC    Collection Time: 09/06/17 12:30 PM   Result Value Ref Range    Pregnancy test,urine (POC) NEGATIVE  NEG         IMAGING RESULTS:  XR CHEST PA LAT   Final Result          MEDICATIONS GIVEN:  Medications - No data to display    IMPRESSION:  1. Acute upper respiratory infection        PLAN:  1. Current Discharge Medication List      START taking these medications    Details   naproxen (NAPROSYN) 500 mg tablet Take 1 Tab by mouth two (2) times daily (with meals). Qty: 20 Tab, Refills: 0      guaiFENesin-dextromethorphan SR (MUCINEX DM) 600-30 mg per tablet Take 1 Tab by mouth two (2) times a day. Qty: 20 Tab, Refills: 0           2. Follow-up Information     Follow up With Details Comments Contact Eldon Rosa MD Schedule an appointment as soon as possible for a visit in 2 days As needed [address]          Return to ED if worse     3:12 PM  I was personally available for consultation in the emergency department. I have reviewed the chart and agree with the documentation recorded by the [facility], including the assessment, treatment plan, and disposition.   Bronson Neil MD

## 2017-09-06 NOTE — ED TRIAGE NOTES
Patient presetns with c/o productive cough, body aches for four days. Describes yellow tinged sputum.

## 2017-09-06 NOTE — ED NOTES
Pt went to xray and back. Pt discharged per provider. No si/s of acute distress. Nonverbal pain of 0/10. Pt discharged with a steady gait.

## 2017-09-06 NOTE — DISCHARGE INSTRUCTIONS

## 2017-09-08 LAB
BACTERIA SPEC CULT: NORMAL
SERVICE CMNT-IMP: NORMAL

## 2017-12-07 ENCOUNTER — APPOINTMENT (OUTPATIENT)
Dept: GENERAL RADIOLOGY | Age: 27
End: 2017-12-07
Attending: EMERGENCY MEDICINE
Payer: MEDICAID

## 2017-12-07 ENCOUNTER — HOSPITAL ENCOUNTER (EMERGENCY)
Age: 27
Discharge: HOME | End: 2017-12-07
Attending: EMERGENCY MEDICINE
Payer: MEDICAID

## 2017-12-07 VITALS
TEMPERATURE: 97.9 F | BODY MASS INDEX: 37.72 KG/M2 | OXYGEN SATURATION: 92 % | RESPIRATION RATE: 16 BRPM | HEIGHT: 65 IN | WEIGHT: 226.41 LBS | HEART RATE: 85 BPM

## 2017-12-07 DIAGNOSIS — S39.012A STRAIN OF LUMBAR REGION, INITIAL ENCOUNTER: Primary | ICD-10-CM

## 2017-12-07 LAB — HCG UR QL: NEGATIVE

## 2017-12-07 PROCEDURE — 72100 X-RAY EXAM L-S SPINE 2/3 VWS: CPT

## 2017-12-07 PROCEDURE — 99283 EMERGENCY DEPT VISIT LOW MDM: CPT

## 2017-12-07 PROCEDURE — 81025 URINE PREGNANCY TEST: CPT

## 2017-12-07 RX ORDER — NAPROXEN 500 MG/1
500 TABLET ORAL 2 TIMES DAILY WITH MEALS
Qty: 20 TAB | Refills: 0 | Status: SHIPPED | OUTPATIENT
Start: 2017-12-07 | End: 2017-12-17

## 2017-12-07 RX ORDER — METHOCARBAMOL 750 MG/1
750 TABLET, FILM COATED ORAL 4 TIMES DAILY
Qty: 20 TAB | Refills: 0 | Status: SHIPPED | OUTPATIENT
Start: 2017-12-07 | End: 2019-02-12

## 2017-12-07 NOTE — DISCHARGE INSTRUCTIONS
Back Strain: Care Instructions  Your Care Instructions    Back strain happens when you overstretch, or pull, a muscle in your back. You may hurt your back in an accident or when you exercise or lift something. Most back pain will get better with rest and time. You can take care of yourself at home to help your back heal.  Follow-up care is a key part of your treatment and safety. Be sure to make and go to all appointments, and call your doctor if you are having problems. It's also a good idea to know your test results and keep a list of the medicines you take. How can you care for yourself at home? · Try to stay as active as you can, but stop or reduce any activity that causes pain. · Put ice or a cold pack on the sore muscle for 10 to 20 minutes at a time to stop swelling. Try this every 1 to 2 hours for 3 days (when you are awake) or until the swelling goes down. Put a thin cloth between the ice pack and your skin. · After 2 or 3 days, apply a heating pad on low or a warm cloth to your back. Some doctors suggest that you go back and forth between hot and cold treatments. · Take pain medicines exactly as directed. ¨ If the doctor gave you a prescription medicine for pain, take it as prescribed. ¨ If you are not taking a prescription pain medicine, ask your doctor if you can take an over-the-counter medicine. · Try sleeping on your side with a pillow between your legs. Or put a pillow under your knees when you lie on your back. These measures can ease pain in your lower back. · Return to your usual level of activity slowly. When should you call for help? Call 911 anytime you think you may need emergency care. For example, call if:  ? · You are unable to move a leg at all. ?Call your doctor now or seek immediate medical care if:  ? · You have new or worse symptoms in your legs, belly, or buttocks. Symptoms may include:  ¨ Numbness or tingling. ¨ Weakness. ¨ Pain.    ? · You lose bladder or bowel control. ? Watch closely for changes in your health, and be sure to contact your doctor if you are not getting better as expected. Where can you learn more? Go to http://kalia-everett.info/. Enter H849 in the search box to learn more about \"Back Strain: Care Instructions. \"  Current as of: March 21, 2017  Content Version: 11.4  © 7664-1652 [employer]. Care instructions adapted under license by [employer] (which disclaims liability or warranty for this information). If you have questions about a medical condition or this instruction, always ask your healthcare professional. [place] [address] any warranty or liability for your use of this information.

## 2017-12-07 NOTE — ED PROVIDER NOTES
EMERGENCY DEPARTMENT HISTORY AND PHYSICAL EXAM      Date: 12/7/2017  Patient Name: Yaya Dasilva    History of Presenting Illness     Chief Complaint   Patient presents with    Back Pain     pt fell in shower this morning. She has low back and buttocks pain       History Provided By: Patient    HPI: Yaya Dasilva, 33 y.o. female with PMHx significant for anemia, presents ambulatory to the ED with cc of sudden onset of lower back pain secondary to a ground level fall that occurred this morning. Pt states that she was in the shower when she fell asleep and slipped in the shower, landing on her rear. She notes that she has been tired lately due to an increased work load at her job. Pt endorses taking Tylenol without relief. She states that she is not followed regularly by an orthopedist. Pt specifically denies numbness, tingling, fever, chills, or urinary/fecal incontinence. PCP: Chavo Green MD    There are no other complaints, changes, or physical findings at this time. Current Outpatient Prescriptions   Medication Sig Dispense Refill    naproxen (NAPROSYN) 500 mg tablet Take 1 Tab by mouth two (2) times daily (with meals) for 10 days. 20 Tab 0    methocarbamol (ROBAXIN-750) 750 mg tablet Take 1 Tab by mouth four (4) times daily. 20 Tab 0    albuterol (PROVENTIL VENTOLIN) 2.5 mg /3 mL (0.083 %) nebulizer solution 3 mL by Nebulization route every four (4) hours as needed for Wheezing.  24 Each 1    Nebulizer & Compressor machine UAD 1 Each 0       Past History     Past Medical History:  Past Medical History:   Diagnosis Date    Abnormal Pap smear     Anemia NEC     per pt takes iron PO    Asthma     bronchitis    Breastfeeding (infant)     Complication of anesthesia     never had    HX OTHER MEDICAL     Pregnant       Past Surgical History:  Past Surgical History:   Procedure Laterality Date    HX GYN      c section       Family History:  Family History   Problem Relation Age of Onset    Cancer Maternal Grandfather     Diabetes Paternal Grandmother        Social History:  Social History   Substance Use Topics    Smoking status: Former Smoker     Packs/day: 0.50     Years: 3.00    Smokeless tobacco: Former User     Quit date: 8/26/2015    Alcohol use Yes      Comment: socially       Allergies:  No Known Allergies      Review of Systems   Review of Systems   Constitutional: Negative. Negative for activity change, appetite change, chills, diaphoresis, fever and unexpected weight change. HENT: Negative for congestion, hearing loss, rhinorrhea, sinus pressure, sneezing, sore throat and trouble swallowing. Eyes: Negative for pain, redness, itching and visual disturbance. Respiratory: Negative for cough, shortness of breath and wheezing. Cardiovascular: Negative for chest pain, palpitations and leg swelling. Gastrointestinal: Negative for abdominal pain, constipation, diarrhea, nausea and vomiting. Genitourinary: Negative for dysuria. Negative for urinary/fecal incontinence. Musculoskeletal: Positive for back pain (lower). Negative for arthralgias, gait problem and myalgias. Skin: Negative for color change, pallor, rash and wound. Neurological: Negative for tremors, weakness, light-headedness, numbness and headaches. All other systems reviewed and are negative. Physical Exam   Physical Exam   Constitutional: She is oriented to person, place, and time. Vital signs are normal. She appears well-developed and well-nourished. No distress. 33 y.o.  female in NAD  Communicates appropriately and in full sentences  Normal vital signs   HENT:   Head: Normocephalic and atraumatic. Eyes: Conjunctivae are normal. Pupils are equal, round, and reactive to light. Right eye exhibits no discharge. Left eye exhibits no discharge. Neck: Normal range of motion. Neck supple.    No nuchal rigidity   Cardiovascular: Normal rate, regular rhythm and intact distal pulses. Pulmonary/Chest: Effort normal and breath sounds normal. No respiratory distress. She has no wheezes. Abdominal: Soft. Bowel sounds are normal. She exhibits no distension. There is no tenderness. Musculoskeletal: Normal range of motion. She exhibits no edema or deformity. No neurologic, motor, vascular, or compartment embarrassment observed on exam. No focal neurologic deficits. BACK: Normal spinal curvatures. No step off or deformity. NT to palpation along midline. Negative seated SLR bilaterally. Flexion/extension movement's at pt's baseline. Ambulatory without difficulty. Minimal R side lumbar muscle tenderness. Neurological: She is alert and oriented to person, place, and time. Coordination normal.   No focal neuro deficits. NVI. Neurologically intact of UE and LE B/L  Sensation intact and symmetrical of UE and LE B/L. Strength 5/5 of UE B/L, Strength 5/5 of LE B/L. Symmetric bulk and tone of LE muscle groups. Skin: Skin is warm and dry. No rash noted. She is not diaphoretic. No erythema. No pallor. Psychiatric: She has a normal mood and affect. Nursing note and vitals reviewed. Diagnostic Study Results     Labs -     Recent Results (from the past 12 hour(s))   HCG URINE, QL. - POC    Collection Time: 12/07/17 11:30 AM   Result Value Ref Range    Pregnancy test,urine (POC) NEGATIVE  NEG         Radiologic Studies -   XR SPINE LUMB 2 OR 3 V   Final Result        CT Results  (Last 48 hours)    None        CXR Results  (Last 48 hours)    None            Medical Decision Making   I am the first provider for this patient. I reviewed the vital signs, available nursing notes, past medical history, past surgical history, family history and social history. Vital Signs-Reviewed the patient's vital signs.   Patient Vitals for the past 12 hrs:   Temp Pulse Resp SpO2   12/07/17 1024 97.9 °F (36.6 °C) 85 16 92 %       Records Reviewed: Nursing Notes and Old Medical Records    Provider Notes (Medical Decision Making):   DDx sprain, strain, spasm, low suspicion for fracture given clinical history    34 yo presents with lower back pain following mechanical slip in the shower. Will evaluate with plain films. Pt is NVI with stable vital signs. No \"red-flags\" reported during history and pt ambulatory without assistance. Plain films negative. Will discharge with symptomatic treatment and urge close orthopedics follow-up. Pt expresses understanding. Provided customary ED return precautions. ED Course:   Initial assessment performed. The patients presenting problems have been discussed, and they are in agreement with the care plan formulated and outlined with them. I have encouraged them to ask questions as they arise throughout their visit. Disposition:  DISCHARGE NOTE  12:15 PM  The patient has been re-evaluated and is ready for discharge. Reviewed available results with patient. Counseled pt on diagnosis and care plan. Pt has expressed understanding, and all questions have been answered. Pt agrees with plan and agrees to F/U as recommended, or return to the ED if their sxs worsen. Discharge instructions have been provided and explained to the pt, along with reasons to return to the ED. Written by Kang Lal, ED Scribe, as dictated by Alexander Neri PA-C. PLAN:  1. Discharge Medication List as of 12/7/2017 12:19 PM      START taking these medications    Details   naproxen (NAPROSYN) 500 mg tablet Take 1 Tab by mouth two (2) times daily (with meals) for 10 days. , Normal, Disp-20 Tab, R-0      methocarbamol (ROBAXIN-750) 750 mg tablet Take 1 Tab by mouth four (4) times daily. , Normal, Disp-20 Tab, R-0         CONTINUE these medications which have NOT CHANGED    Details   albuterol (PROVENTIL VENTOLIN) 2.5 mg /3 mL (0.083 %) nebulizer solution 3 mL by Nebulization route every four (4) hours as needed for Wheezing., Normal, Disp-24 Each, R-1      Nebulizer & Compressor machine UAD, Normal, Disp-1 Each, R-0           2. Follow-up Information     Follow up With Details Comments Contact Livan Colvin MD Schedule an appointment as soon as possible for a visit in 2 days As needed, If symptoms worsen, Possible further evaluation and treatment [address]      [facility] EMERGENCY DEPT Go to If symptoms worsen [address] [facility] [address] Carly Silva MD Schedule an appointment as soon as possible for a visit in 2 days As needed, If symptoms worsen, Possible further evaluation and treatment [address]. [phone]      [facility] EMERGENCY DEPT Go to As needed, If symptoms worsen [address] [place] [address]  [phone]        Return to ED if worse     Diagnosis     Clinical Impression:   1. Strain of lumbar region, initial encounter        Attestations:    Attestations: This note is prepared by Ariel Chance, acting as Scribe for [employer]. The scribe's documentation has been prepared under my direction and personally reviewed by me in its entirety. I confirm that the note above accurately reflects all work, treatment, procedures, and medical decision making performed by me. [employer]        This note will not be viewable in [address]

## 2017-12-07 NOTE — LETTER
[address] 
[facility] EMERGENCY DEPT 
[address] [place] [address] [address] [id number] [phone] Work/School Note Date: 12/7/2017 To Whom It May concern: Steven Townsend was seen and treated today in the emergency room by the following provider(s): 
Attending Provider: Marco Antonio Dominguez. Beba Garcias MD 
Physician Assistant: Noe Tyson [place], [place]. Steven Townsend may return to work on 12/9/2017.  
 
Sincerely, 
 
 
 
 
Rod Christianson PA-C

## 2018-01-04 ENCOUNTER — HOSPITAL ENCOUNTER (EMERGENCY)
Age: 28
Discharge: HOME | End: 2018-01-04
Attending: EMERGENCY MEDICINE
Payer: MEDICAID

## 2018-01-04 ENCOUNTER — APPOINTMENT (OUTPATIENT)
Dept: ULTRASOUND IMAGING | Age: 28
End: 2018-01-04
Attending: EMERGENCY MEDICINE
Payer: MEDICAID

## 2018-01-04 VITALS
HEIGHT: 65 IN | TEMPERATURE: 97.9 F | BODY MASS INDEX: 38.2 KG/M2 | DIASTOLIC BLOOD PRESSURE: 73 MMHG | SYSTOLIC BLOOD PRESSURE: 113 MMHG | RESPIRATION RATE: 16 BRPM | WEIGHT: 229.28 LBS | OXYGEN SATURATION: 100 % | HEART RATE: 89 BPM

## 2018-01-04 DIAGNOSIS — O26.899 ABDOMINAL CRAMPING COMPLICATING PREGNANCY: ICD-10-CM

## 2018-01-04 DIAGNOSIS — D63.8 ANEMIA, CHRONIC DISEASE: ICD-10-CM

## 2018-01-04 DIAGNOSIS — R10.9 ABDOMINAL CRAMPING COMPLICATING PREGNANCY: ICD-10-CM

## 2018-01-04 DIAGNOSIS — Z3A.01 LESS THAN 8 WEEKS GESTATION OF PREGNANCY: ICD-10-CM

## 2018-01-04 DIAGNOSIS — N93.9 VAGINAL BLEEDING: Primary | ICD-10-CM

## 2018-01-04 LAB
ABO + RH BLD: NORMAL
ALBUMIN SERPL-MCNC: 3.1 G/DL (ref 3.5–5)
ALBUMIN/GLOB SERPL: 0.9 {RATIO} (ref 1.1–2.2)
ALP SERPL-CCNC: 85 U/L (ref 45–117)
ALT SERPL-CCNC: 18 U/L (ref 12–78)
ANION GAP SERPL CALC-SCNC: 5 MMOL/L (ref 5–15)
APPEARANCE UR: ABNORMAL
AST SERPL-CCNC: 9 U/L (ref 15–37)
BACTERIA URNS QL MICRO: ABNORMAL /HPF
BILIRUB SERPL-MCNC: 0.2 MG/DL (ref 0.2–1)
BILIRUB UR QL: NEGATIVE
BLOOD GROUP ANTIBODIES SERPL: NORMAL
BUN SERPL-MCNC: 10 MG/DL (ref 6–20)
BUN/CREAT SERPL: 19 (ref 12–20)
CALCIUM SERPL-MCNC: 8 MG/DL (ref 8.5–10.1)
CHLORIDE SERPL-SCNC: 108 MMOL/L (ref 97–108)
CLUE CELLS VAG QL WET PREP: NORMAL
CO2 SERPL-SCNC: 24 MMOL/L (ref 21–32)
COLOR UR: ABNORMAL
CREAT SERPL-MCNC: 0.52 MG/DL (ref 0.55–1.02)
EPITH CASTS URNS QL MICRO: ABNORMAL /LPF
ERYTHROCYTE [DISTWIDTH] IN BLOOD BY AUTOMATED COUNT: 17.4 % (ref 11.5–14.5)
GLOBULIN SER CALC-MCNC: 3.5 G/DL (ref 2–4)
GLUCOSE SERPL-MCNC: 88 MG/DL (ref 65–100)
GLUCOSE UR STRIP.AUTO-MCNC: NEGATIVE MG/DL
HCG SERPL-ACNC: 245 MIU/ML (ref 0–6)
HCT VFR BLD AUTO: 32.3 % (ref 35–47)
HGB BLD-MCNC: 9.7 G/DL (ref 11.5–16)
HGB UR QL STRIP: ABNORMAL
HYALINE CASTS URNS QL MICRO: ABNORMAL /LPF (ref 0–5)
KETONES UR QL STRIP.AUTO: NEGATIVE MG/DL
KOH PREP SPEC: NORMAL
LEUKOCYTE ESTERASE UR QL STRIP.AUTO: NEGATIVE
MCH RBC QN AUTO: 22.6 PG (ref 26–34)
MCHC RBC AUTO-ENTMCNC: 30 G/DL (ref 30–36.5)
MCV RBC AUTO: 75.1 FL (ref 80–99)
NITRITE UR QL STRIP.AUTO: NEGATIVE
PH UR STRIP: 6.5 [PH] (ref 5–8)
PLATELET # BLD AUTO: 203 K/UL (ref 150–400)
POTASSIUM SERPL-SCNC: 3.6 MMOL/L (ref 3.5–5.1)
PROT SERPL-MCNC: 6.6 G/DL (ref 6.4–8.2)
PROT UR STRIP-MCNC: NEGATIVE MG/DL
RBC # BLD AUTO: 4.3 M/UL (ref 3.8–5.2)
RBC #/AREA URNS HPF: >100 /HPF (ref 0–5)
SERVICE CMNT-IMP: NORMAL
SODIUM SERPL-SCNC: 137 MMOL/L (ref 136–145)
SP GR UR REFRACTOMETRY: 1.02 (ref 1–1.03)
SPECIMEN EXP DATE BLD: NORMAL
T VAGINALIS VAG QL WET PREP: NORMAL
UA: UC IF INDICATED,UAUC: ABNORMAL
UROBILINOGEN UR QL STRIP.AUTO: 0.2 EU/DL (ref 0.2–1)
WBC # BLD AUTO: 10.2 K/UL (ref 3.6–11)
WBC URNS QL MICRO: ABNORMAL /HPF (ref 0–4)

## 2018-01-04 PROCEDURE — 87086 URINE CULTURE/COLONY COUNT: CPT | Performed by: EMERGENCY MEDICINE

## 2018-01-04 PROCEDURE — 85027 COMPLETE CBC AUTOMATED: CPT | Performed by: EMERGENCY MEDICINE

## 2018-01-04 PROCEDURE — 87210 SMEAR WET MOUNT SALINE/INK: CPT | Performed by: EMERGENCY MEDICINE

## 2018-01-04 PROCEDURE — 74011250637 HC RX REV CODE- 250/637: Performed by: EMERGENCY MEDICINE

## 2018-01-04 PROCEDURE — 84702 CHORIONIC GONADOTROPIN TEST: CPT | Performed by: EMERGENCY MEDICINE

## 2018-01-04 PROCEDURE — 36415 COLL VENOUS BLD VENIPUNCTURE: CPT | Performed by: EMERGENCY MEDICINE

## 2018-01-04 PROCEDURE — 76817 TRANSVAGINAL US OBSTETRIC: CPT

## 2018-01-04 PROCEDURE — 99284 EMERGENCY DEPT VISIT MOD MDM: CPT

## 2018-01-04 PROCEDURE — 81001 URINALYSIS AUTO W/SCOPE: CPT | Performed by: EMERGENCY MEDICINE

## 2018-01-04 PROCEDURE — 87186 SC STD MICRODIL/AGAR DIL: CPT | Performed by: EMERGENCY MEDICINE

## 2018-01-04 PROCEDURE — 80053 COMPREHEN METABOLIC PANEL: CPT | Performed by: EMERGENCY MEDICINE

## 2018-01-04 PROCEDURE — 87491 CHLMYD TRACH DNA AMP PROBE: CPT | Performed by: EMERGENCY MEDICINE

## 2018-01-04 PROCEDURE — 86900 BLOOD TYPING SEROLOGIC ABO: CPT | Performed by: EMERGENCY MEDICINE

## 2018-01-04 PROCEDURE — 87077 CULTURE AEROBIC IDENTIFY: CPT | Performed by: EMERGENCY MEDICINE

## 2018-01-04 RX ORDER — ACETAMINOPHEN 325 MG/1
650 TABLET ORAL
Status: COMPLETED | OUTPATIENT
Start: 2018-01-04 | End: 2018-01-04

## 2018-01-04 RX ADMIN — ACETAMINOPHEN 650 MG: 325 TABLET ORAL at 17:18

## 2018-01-04 NOTE — ED TRIAGE NOTES
Pt. Presents to ED today for complaints of vaginal bleeding. PT. Reports being 6 weeks pregnant. Pt. Alert and oriented x4. Pt. Placed in position of comfort with call bell in reach.

## 2018-01-04 NOTE — LETTER
1/6/2018 Darrin Verdugo [address] [facility] [address] [address] Dear Ms. Lay, You were recently seen in the Emergency Department of [address] [place] [address] and had lab work performed. We would like to discuss these results with you. Please call the Emergency Department at your earliest convenience at [phone] between 10am-8pm to speak with one of our providers. Sincerely, [facility] 
[address] [place] [address]. 
[phone]

## 2018-01-04 NOTE — DISCHARGE INSTRUCTIONS
Thank you! Thank you for allowing us to provide you with excellent care today. We hope we addressed all of your concerns and needs. We strive to provide excellent quality care in the Emergency Department. You will receive a survey after your visit to evaluate the care you were provided. Please rate us a level 5 (excellent), as anything less than excellent does not meet our goals. If you feel that you have not received excellent quality care or timely care, please ask to speak to the nurse manager. Please choose us in the future for your continued health care needs. ------------------------------------------------------------------------------------------------------------  The exam and treatment you received in the Emergency Department were for an urgent problem and are not intended as complete care. It is important that you follow up with a doctor, nurse practitioner, or physician assistant for ongoing care. If your symptoms become worse or you do not improve as expected and you are unable to reach your usual health care provider, you should return to the Emergency Department. We are available 24 hours a day. Please take your discharge instructions with you when you go to your follow-up appointment. If you have any problem arranging a follow-up appointment, contact the Emergency Department immediately. If a prescription has been provided, please have it filled as soon as possible to prevent a delay in treatment. Read the entire medication instruction sheet provided to you by the pharmacy. If you have any questions or reservations about taking the medication due to side effects or interactions with other medications, please call your primary care physician or contact the ER to speak with the charge nurse. Make an appointment with your family doctor or the physician you were referred to for follow-up of this visit as instructed on your discharge paperwork, as this is mandatory follow-up. Return to the ER if you are unable to be seen or if you are unable to be seen in a timely manner. If you have any problem arranging the follow-up visit, contact the Emergency Department immediately. Abdominal Pain: Care Instructions  Your Care Instructions    Abdominal pain has many possible causes. Some aren't serious and get better on their own in a few days. Others need more testing and treatment. If your pain continues or gets worse, you need to be rechecked and may need more tests to find out what is wrong. You may need surgery to correct the problem. Don't ignore new symptoms, such as fever, nausea and vomiting, urination problems, pain that gets worse, and dizziness. These may be signs of a more serious problem. Your doctor may have recommended a follow-up visit in the next 8 to 12 hours. If you are not getting better, you may need more tests or treatment. The doctor has checked you carefully, but problems can develop later. If you notice any problems or new symptoms, get medical treatment right away. Follow-up care is a key part of your treatment and safety. Be sure to make and go to all appointments, and call your doctor if you are having problems. It's also a good idea to know your test results and keep a list of the medicines you take. How can you care for yourself at home? · Rest until you feel better. · To prevent dehydration, drink plenty of fluids, enough so that your urine is light yellow or clear like water. Choose water and other caffeine-free clear liquids until you feel better. If you have kidney, heart, or liver disease and have to limit fluids, talk with your doctor before you increase the amount of fluids you drink. · If your stomach is upset, eat mild foods, such as rice, dry toast or crackers, bananas, and applesauce. Try eating several small meals instead of two or three large ones.   · Wait until 48 hours after all symptoms have gone away before you have spicy foods, alcohol, and drinks that contain caffeine. · Do not eat foods that are high in fat. · Avoid anti-inflammatory medicines such as aspirin, ibuprofen (Advil, Motrin), and naproxen (Aleve). These can cause stomach upset. Talk to your doctor if you take daily aspirin for another health problem. When should you call for help? Call 911 anytime you think you may need emergency care. For example, call if:  ? · You passed out (lost consciousness). ? · You pass maroon or very bloody stools. ? · You vomit blood or what looks like coffee grounds. ? · You have new, severe belly pain. ?Call your doctor now or seek immediate medical care if:  ? · Your pain gets worse, especially if it becomes focused in one area of your belly. ? · You have a new or higher fever. ? · Your stools are black and look like tar, or they have streaks of blood. ? · You have unexpected vaginal bleeding. ? · You have symptoms of a urinary tract infection. These may include:  ¨ Pain when you urinate. ¨ Urinating more often than usual.  ¨ Blood in your urine. ? · You are dizzy or lightheaded, or you feel like you may faint. ? Watch closely for changes in your health, and be sure to contact your doctor if:  ? · You are not getting better after 1 day (24 hours). Where can you learn more? Go to http://kalia-everett.info/. Enter W157 in the search box to learn more about \"Abdominal Pain: Care Instructions. \"  Current as of: March 20, 2017  Content Version: 11.4  © 9614-4283 [employer]. Care instructions adapted under license by "[employer]" (which disclaims liability or warranty for this information). If you have questions about a medical condition or this instruction, always ask your healthcare professional. [address] any warranty or liability for your use of this information.

## 2018-01-04 NOTE — ED NOTES
Patient discharged by Dr. Jennifer Hand. Patient provided with discharge instructions Rx and instructions on follow up care. Patient out of ED ambulatory accompanied by family.

## 2018-01-04 NOTE — ED PROVIDER NOTES
EMERGENCY DEPARTMENT HISTORY AND PHYSICAL EXAM      Date: 2018  Patient Name: Unknown Hazard    History of Presenting Illness     Chief Complaint   Patient presents with    Vaginal Bleeding     x today - reports that she is 6 weeks pregnant; reports that she has been experiencing cramping x 1 week       History Provided By: Patient    HPI: Unknown Hazard, 33 y.o. female (/A2), presents ambulatory and ~ 6 weeks pregnant per LMP to the ED with cc of constant vaginal spotting that started PTA. The patient reports that she went to the restroom and noticed some bleeding after urinating. She notes that she has used one pad since her ED arrival. She also c/o cramping, lower abdominal pain x 1 week. She states that she is taking her prenatal vitamins. She states that she had a history of twins, but she miscarried one of her twins. She also notes that she was pregnant 6 months ago, but she miscarried. She denies any recent injuries, falls, or trauma. She specifically denies dysuria, nausea, vomiting, SOB, or other complaints at this time. There are no other complaints, changes, or physical findings at this time. PCP: Js Mon MD    Current Outpatient Prescriptions   Medication Sig Dispense Refill    methocarbamol (ROBAXIN-750) 750 mg tablet Take 1 Tab by mouth four (4) times daily. 20 Tab 0    albuterol (PROVENTIL VENTOLIN) 2.5 mg /3 mL (0.083 %) nebulizer solution 3 mL by Nebulization route every four (4) hours as needed for Wheezing.  24 Each 1    Nebulizer & Compressor machine UAD 1 Each 0       Past History     Past Medical History:  Past Medical History:   Diagnosis Date    Abnormal Pap smear     Anemia NEC     per pt takes iron PO    Asthma     bronchitis    Breastfeeding (infant)     Complication of anesthesia     never had    HX OTHER MEDICAL     Pregnant       Past Surgical History:  Past Surgical History:   Procedure Laterality Date    HX GYN      c section Family History:  Family History   Problem Relation Age of Onset    Cancer Maternal Grandfather     Diabetes Paternal Grandmother        Social History:  Social History   Substance Use Topics    Smoking status: Former Smoker     Packs/day: 0.50     Years: 3.00    Smokeless tobacco: Former User     Quit date: 8/26/2015    Alcohol use Yes      Comment: socially       Allergies:  No Known Allergies      Review of Systems   Review of Systems   Constitutional: Negative. Negative for chills and fever. HENT: Negative. Negative for congestion, facial swelling, rhinorrhea, sore throat, trouble swallowing and voice change. Eyes: Negative. Respiratory: Negative. Negative for apnea, cough, chest tightness, shortness of breath and wheezing. Cardiovascular: Negative. Negative for chest pain, palpitations and leg swelling. Gastrointestinal: Positive for abdominal pain. Negative for abdominal distention, blood in stool, constipation, diarrhea, nausea and vomiting. Endocrine: Negative. Negative for cold intolerance, heat intolerance and polyuria. Genitourinary: Positive for vaginal bleeding. Negative for difficulty urinating, dysuria, flank pain, frequency, hematuria and urgency. Musculoskeletal: Negative. Negative for arthralgias, back pain, myalgias, neck pain and neck stiffness. Skin: Negative. Negative for color change and rash. Neurological: Negative. Negative for dizziness, syncope, facial asymmetry, speech difficulty, weakness, light-headedness, numbness and headaches. Hematological: Negative. Does not bruise/bleed easily. Psychiatric/Behavioral: Negative. Negative for confusion and self-injury. The patient is not nervous/anxious. Physical Exam   Physical Exam   Constitutional: She is oriented to person, place, and time. She appears well-developed and well-nourished. No distress. HENT:   Head: Normocephalic and atraumatic.    Mouth/Throat: Oropharynx is clear and moist. No oropharyngeal exudate. Eyes: Conjunctivae and EOM are normal. Pupils are equal, round, and reactive to light. Neck: Normal range of motion. Cardiovascular: Normal rate, regular rhythm and normal heart sounds. Exam reveals no gallop and no friction rub. No murmur heard. Pulmonary/Chest: Effort normal and breath sounds normal. No respiratory distress. She has no wheezes. She has no rales. She exhibits no tenderness. Abdominal: Soft. Bowel sounds are normal. She exhibits no distension and no mass. There is tenderness in the suprapubic area. There is no rebound and no guarding. Genitourinary:   Genitourinary Comments: Pelvic Exam: Dark blood in vaginal vault, gelatinous material coming from os, cervical os is closed, left adnexal tenderness, no erythema or masses palpable. Musculoskeletal: Normal range of motion. She exhibits no edema, tenderness or deformity. Neurological: She is alert and oriented to person, place, and time. She displays normal reflexes. No cranial nerve deficit. She exhibits normal muscle tone. Coordination normal.   Skin: Skin is warm. No rash noted. She is not diaphoretic. Psychiatric: She has a normal mood and affect. Nursing note and vitals reviewed.       Diagnostic Study Results     Labs -     Recent Results (from the past 12 hour(s))   URINALYSIS W/ REFLEX CULTURE    Collection Time: 01/04/18  4:49 PM   Result Value Ref Range    Color YELLOW/STRAW      Appearance CLOUDY (A) CLEAR      Specific gravity 1.025 1.003 - 1.030      pH (UA) 6.5 5.0 - 8.0      Protein NEGATIVE  NEG mg/dL    Glucose NEGATIVE  NEG mg/dL    Ketone NEGATIVE  NEG mg/dL    Bilirubin NEGATIVE  NEG      Blood LARGE (A) NEG      Urobilinogen 0.2 0.2 - 1.0 EU/dL    Nitrites NEGATIVE  NEG      Leukocyte Esterase NEGATIVE  NEG      WBC 0-4 0 - 4 /hpf    RBC >100 (H) 0 - 5 /hpf    Epithelial cells MODERATE (A) FEW /lpf    Bacteria 1+ (A) NEG /hpf    UA:UC IF INDICATED URINE CULTURE ORDERED (A) CNI Hyaline cast 0-2 0 - 5 /lpf   LEONELA, OTHER SOURCES    Collection Time: 18  4:49 PM   Result Value Ref Range    Special Requests: NO SPECIAL REQUESTS      KOH NO YEAST SEEN     WET PREP    Collection Time: 18  4:49 PM   Result Value Ref Range    Clue cells CLUE CELLS ABSENT      Wet prep NO TRICHOMONAS SEEN     TYPE & SCREEN    Collection Time: 18  5:08 PM   Result Value Ref Range    Crossmatch Expiration 2018     ABO/Rh(D) Josiephine Vesper POSITIVE     Antibody screen NEG    CBC W/O DIFF    Collection Time: 18  5:08 PM   Result Value Ref Range    WBC 10.2 3.6 - 11.0 K/uL    RBC 4.30 3.80 - 5.20 M/uL    HGB 9.7 (L) 11.5 - 16.0 g/dL    HCT 32.3 (L) 35.0 - 47.0 %    MCV 75.1 (L) 80.0 - 99.0 FL    MCH 22.6 (L) 26.0 - 34.0 PG    MCHC 30.0 30.0 - 36.5 g/dL    RDW 17.4 (H) 11.5 - 14.5 %    PLATELET 516 829 - 144 K/uL   METABOLIC PANEL, COMPREHENSIVE    Collection Time: 18  5:08 PM   Result Value Ref Range    Sodium 137 136 - 145 mmol/L    Potassium 3.6 3.5 - 5.1 mmol/L    Chloride 108 97 - 108 mmol/L    CO2 24 21 - 32 mmol/L    Anion gap 5 5 - 15 mmol/L    Glucose 88 65 - 100 mg/dL    BUN 10 6 - 20 MG/DL    Creatinine 0.52 (L) 0.55 - 1.02 MG/DL    BUN/Creatinine ratio 19 12 - 20      GFR est AA >60 >60 ml/min/1.73m2    GFR est non-AA >60 >60 ml/min/1.73m2    Calcium 8.0 (L) 8.5 - 10.1 MG/DL    Bilirubin, total 0.2 0.2 - 1.0 MG/DL    ALT (SGPT) 18 12 - 78 U/L    AST (SGOT) 9 (L) 15 - 37 U/L    Alk. phosphatase 85 45 - 117 U/L    Protein, total 6.6 6.4 - 8.2 g/dL    Albumin 3.1 (L) 3.5 - 5.0 g/dL    Globulin 3.5 2.0 - 4.0 g/dL    A-G Ratio 0.9 (L) 1.1 - 2.2     TOTAL HCG, QT.     Collection Time: 18  5:08 PM   Result Value Ref Range    Beta HCG,  (H) 0 - 6 MIU/ML       Radiologic Studies -    EXAM:  US UTS TRANSVAGINAL OB     INDICATION:   vaginal bleeding and abd pain, 6 weeks LMP, concern for ectopic vs       COMPARISON: None.     TECHNIQUE:  Realtime sonography of the pelvis was performed transvaginally with multiple  static images obtained.      FINDINGS:  The uterus measures 7.3 x 4.2 x 5.0 cm. The examination demonstrates a single  intrauterine pregnancy with a gestational sac measuring 0.51 cm which is  consistent with the last menstrual period. Fetal cardiac activity is not well  identified as it is early. The placenta is not well identified as it is early. There is adequate amniotic fluid. The ovaries are normal in appearance. The  right ovary measures 2.6 x 1.7 x 1.5 cm. The left ovary measures 1.7 x 0.9 x 0.7  cm. The endometrial stripe is thickened at 1.7 cm. A small amount of fluid is  seen within the cervix.     IMPRESSION  IMPRESSION: A gestational sac is seen within the endometrial canal consistent  with early pregnancy. A small amount of fluid is seen within the cervix.                       Medical Decision Making   I am the first provider for this patient. I reviewed the vital signs, available nursing notes, past medical history, past surgical history, family history and social history. Vital Signs-Reviewed the patient's vital signs. Patient Vitals for the past 12 hrs:   Temp Pulse Resp BP SpO2   18 1619 97.9 °F (36.6 °C) 89 16 113/73 100 %       Records Reviewed: Nursing Notes, Old Medical Records and Previous Laboratory Studies    Provider Notes (Medical Decision Making):   DDx: Threatened AB, ectopic pregnancy, UTI, spontaneous AB, incomplete AB, placenta previa, placenta abruption. 33 y.o. F, /A2, presents 6 weeks pregnant per LMP with vaginal bleeding and abdominal cramping. Concern for above. Will order labs, type and screen, beta HCG, pelvic ultrasound, and consult OB as needed. Will provide pain control. ED Course:   Initial assessment performed. The patients presenting problems have been discussed, and they are in agreement with the care plan formulated and outlined with them.   I have encouraged them to ask questions as they arise throughout their visit. Procedure Note - Pelvic Exam:    4:45 PM  Performed by: Leonidas Honeycutt MD  Chaperoned by: Amarilis Albert RN  Pelvic exam was performed using bimanual and speculum. Further findings noted in physical exam.   The procedure took 1-15 minutes, and pt tolerated well. Progress Note:  6:34 PM  The patient's pelvic ultrasound is significant for a gestational sac within the endometrial canal. Pt is 6 weeks pregnant. Pt and  were informed of the results of her labs and ultrasound, and they convey their understanding. Pt was recommended to follow up with her OB/GYN for repeat exam and beta Hcg; bleeding precautions given to patient. Written by Arjun Reynaga, PENNY Scribe, as dictated by Leonidas Honeycutt MD.    Critical Care Time: 0 minutes    Discharge Note:  6:36 PM  The pt is ready for discharge. The pt's signs, symptoms, diagnosis, and discharge instructions have been discussed and pt has conveyed their understanding. The pt is to follow up as recommended or return to ER should their symptoms worsen. Plan has been discussed and pt is in agreement. PLAN:  1. Current Discharge Medication List        2. Follow-up Information     Follow up With Details Comments [address]  [id number]  [phone]      [facility] EMERGENCY DEPT  As needed, If symptoms worsen [address]  [phone]        Return to ED if worse     Diagnosis     Clinical Impression:   1. Vaginal bleeding    2. Abdominal cramping complicating pregnancy    3. Less than 8 weeks gestation of pregnancy    4. Anemia, chronic disease        Attestations: This note is prepared by Arjun Reynaga, acting as a Scribe for Leonidas Honeycutt MD.    Leonidas Honeycutt MD: The scribe's documentation has been prepared under my direction and personally reviewed by me in its entirety.  I confirm that the notes above accurately reflects all work, treatment, procedures, and medical decision making performed by me. This note will not be viewable in [address]

## 2018-01-05 LAB
C TRACH DNA SPEC QL NAA+PROBE: NEGATIVE
N GONORRHOEA DNA SPEC QL NAA+PROBE: NEGATIVE
SAMPLE TYPE: NORMAL
SERVICE CMNT-IMP: NORMAL
SPECIMEN SOURCE: NORMAL

## 2018-01-06 LAB
BACTERIA SPEC CULT: ABNORMAL
CC UR VC: ABNORMAL
SERVICE CMNT-IMP: ABNORMAL

## 2018-01-06 NOTE — PROGRESS NOTES
Unable to reach patient via phone. One number provided was not a working number, the other was a wrong number. Letter sent. Pt determined to be in early pregnancy during ED visit.

## 2018-01-12 RX ORDER — NITROFURANTOIN 25; 75 MG/1; MG/1
100 CAPSULE ORAL 2 TIMES DAILY
Qty: 14 CAP | Refills: 0 | Status: SHIPPED | OUTPATIENT
Start: 2018-01-12 | End: 2018-01-19

## 2018-01-29 ENCOUNTER — HOSPITAL ENCOUNTER (EMERGENCY)
Age: 28
Discharge: HOME | End: 2018-01-29
Attending: EMERGENCY MEDICINE
Payer: MEDICAID

## 2018-01-29 VITALS
WEIGHT: 228 LBS | RESPIRATION RATE: 16 BRPM | BODY MASS INDEX: 38.93 KG/M2 | HEART RATE: 84 BPM | OXYGEN SATURATION: 100 % | DIASTOLIC BLOOD PRESSURE: 81 MMHG | HEIGHT: 64 IN | SYSTOLIC BLOOD PRESSURE: 129 MMHG | TEMPERATURE: 98.3 F

## 2018-01-29 DIAGNOSIS — R68.89 FLU-LIKE SYMPTOMS: Primary | ICD-10-CM

## 2018-01-29 DIAGNOSIS — F17.200 NICOTINE DEPENDENCE, UNCOMPLICATED, UNSPECIFIED NICOTINE PRODUCT TYPE: ICD-10-CM

## 2018-01-29 DIAGNOSIS — R19.7 DIARRHEA, UNSPECIFIED TYPE: ICD-10-CM

## 2018-01-29 DIAGNOSIS — R11.0 NAUSEA WITHOUT VOMITING: ICD-10-CM

## 2018-01-29 LAB
FLUAV AG NPH QL IA: NEGATIVE
FLUBV AG NOSE QL IA: NEGATIVE

## 2018-01-29 PROCEDURE — 99282 EMERGENCY DEPT VISIT SF MDM: CPT

## 2018-01-29 PROCEDURE — 87804 INFLUENZA ASSAY W/OPTIC: CPT | Performed by: PHYSICIAN ASSISTANT

## 2018-01-29 RX ORDER — ONDANSETRON 4 MG/1
4 TABLET, ORALLY DISINTEGRATING ORAL
Qty: 10 TAB | Refills: 0 | Status: SHIPPED | OUTPATIENT
Start: 2018-01-29 | End: 2019-03-13

## 2018-01-29 RX ORDER — DICYCLOMINE HYDROCHLORIDE 10 MG/1
10 CAPSULE ORAL 4 TIMES DAILY
Qty: 20 CAP | Refills: 0 | Status: SHIPPED | OUTPATIENT
Start: 2018-01-29 | End: 2018-02-03

## 2018-01-29 NOTE — DISCHARGE INSTRUCTIONS
Learning About Benefits From Quitting Smoking  How does quitting smoking make you healthier? If you're thinking about quitting smoking, you may have a few reasons to be smoke-free. Your health may be one of them. · When you quit smoking, you lower your risks for cancer, lung disease, heart attack, stroke, blood vessel disease, and blindness from macular degeneration. · When you're smoke-free, you get sick less often, and you heal faster. You are less likely to get colds, flu, bronchitis, and pneumonia. · As a nonsmoker, you may find that your mood is better and you are less stressed. When and how will you feel healthier? Quitting has real health benefits that start from day 1 of being smoke-free. And the longer you stay smoke-free, the healthier you get and the better you feel. The first hours  · After just 20 minutes, your blood pressure and heart rate go down. That means there's less stress on your heart and blood vessels. · Within 12 hours, the level of carbon monoxide in your blood drops back to normal. That makes room for more oxygen. With more oxygen in your body, you may notice that you have more energy than when you smoked. After 2 weeks  · Your lungs start to work better. · Your risk of heart attack starts to drop. After 1 month  · When your lungs are clear, you cough less and breathe deeper, so it's easier to be active. · Your sense of taste and smell return. That means you can enjoy food more than you have since you started smoking. Over the years  · After 1 year, your risk of heart disease is half what it would be if you kept smoking. · After 5 years, your risk of stroke starts to shrink. Within a few years after that, it's about the same as if you'd never smoked. · After 10 years, your risk of dying from lung cancer is cut by about half. And your risk for many other types of cancer is lower too. How would quitting help others in your life?   When you quit smoking, you improve the health of everyone who now breathes in your smoke. · Their heart, lung, and cancer risks drop, much like yours. · They are sick less. For babies and small children, living smoke-free means they're less likely to have ear infections, pneumonia, and bronchitis. · If you're a woman who is or will be pregnant someday, quitting smoking means a healthier . · Children who are close to you are less likely to become adult smokers. Where can you learn more? Go to http://kalia-everett.info/. Enter 052 [id number] [id number] in the search box to learn more about \"Learning About Benefits From Quitting Smoking. \"  Current as of: 2017  Content Version: 11.4  © 6306-9968 [employer] Care instructions adapted under license by [employer] (which disclaims liability or warranty for this information). If you have questions about a medical condition or this instruction, always ask your healthcare professional. [place] [address] any warranty or liability for your use of this information. Diarrhea: Care Instructions  Your Care Instructions    Diarrhea is loose, watery stools (bowel movements). The exact cause is often hard to find. Sometimes diarrhea is your body's way of getting rid of what caused an upset stomach. Viruses, food poisoning, and many medicines can cause diarrhea. Some people get diarrhea in response to emotional stress, anxiety, or certain foods. Almost everyone has diarrhea now and then. It usually isn't serious, and your stools will return to normal soon. The important thing to do is replace the fluids you have lost, so you can prevent dehydration. The doctor has checked you carefully, but problems can develop later. If you notice any problems or new symptoms, get medical treatment right away. Follow-up care is a key part of your treatment and safety. Be sure to make and go to all appointments, and call your doctor if you are having problems.  It's also a good idea to know your test results and keep a list of the medicines you take. How can you care for yourself at home? · Watch for signs of dehydration, which means your body has lost too much water. Dehydration is a serious condition and should be treated right away. Signs of dehydration are:  ¨ Increasing thirst and dry eyes and mouth. ¨ Feeling faint or lightheaded. ¨ Darker urine, and a smaller amount of urine than normal.  · To prevent dehydration, drink plenty of fluids, enough so that your urine is light yellow or clear like water. Choose water and other caffeine-free clear liquids until you feel better. If you have kidney, heart, or liver disease and have to limit fluids, talk with your doctor before you increase the amount of fluids you drink. · Begin eating small amounts of mild foods the next day, if you feel like it. ¨ Try yogurt that has live cultures of Lactobacillus. (Check the label.)  ¨ Avoid spicy foods, fruits, alcohol, and caffeine until 48 hours after all symptoms are gone. ¨ Avoid chewing gum that contains sorbitol. ¨ Avoid dairy products (except for yogurt with Lactobacillus) while you have diarrhea and for 3 days after symptoms are gone. · The doctor may recommend that you take over-the-counter medicine, such as loperamide (Imodium), if you still have diarrhea after 6 hours. Read and follow all instructions on the label. Do not use this medicine if you have bloody diarrhea, a high fever, or other signs of serious illness. Call your doctor if you think you are having a problem with your medicine. When should you call for help? Call 911 anytime you think you may need emergency care. For example, call if:  ? · You passed out (lost consciousness). ? · Your stools are maroon or very bloody. ?Call your doctor now or seek immediate medical care if:  ? · You are dizzy or lightheaded, or you feel like you may faint.    ? · Your stools are black and look like tar, or they have streaks of blood.   ? · You have new or worse belly pain. ? · You have symptoms of dehydration, such as:  ¨ Dry eyes and a dry mouth. ¨ Passing only a little dark urine. ¨ Feeling thirstier than usual.   ? · You have a new or higher fever. ? Watch closely for changes in your health, and be sure to contact your doctor if:  ? · Your diarrhea is getting worse. ? · You see pus in the diarrhea. ? · You are not getting better after 2 days (48 hours). Where can you learn more? Go to http://kalia-everett.info/. Enter C294 in the search box to learn more about \"Diarrhea: Care Instructions. \"  Current as of: March 20, 2017  Content Version: 11.4  © 9902-5250 [employer]. Care instructions adapted under license by [employer] (which disclaims liability or warranty for this information). If you have questions about a medical condition or this instruction, always ask your healthcare professional. [place] [address] any warranty or liability for your use of this information. Nausea and Vomiting: Care Instructions  Your Care Instructions    When you are nauseated, you may feel weak and sweaty and notice a lot of saliva in your mouth. Nausea often leads to vomiting. Most of the time you do not need to worry about nausea and vomiting, but they can be signs of other illnesses. Two common causes of nausea and vomiting are stomach flu and food poisoning. Nausea and vomiting from viral stomach flu will usually start to improve within 24 hours. Nausea and vomiting from food poisoning may last from 12 to 48 hours. The doctor has checked you carefully, but problems can develop later. If you notice any problems or new symptoms, get medical treatment right away. Follow-up care is a key part of your treatment and safety. Be sure to make and go to all appointments, and call your doctor if you are having problems.  It's also a good idea to know your test results and keep a list of the medicines you take. How can you care for yourself at home? · To prevent dehydration, drink plenty of fluids, enough so that your urine is light yellow or clear like water. Choose water and other caffeine-free clear liquids until you feel better. If you have kidney, heart, or liver disease and have to limit fluids, talk with your doctor before you increase the amount of fluids you drink. · Rest in bed until you feel better. · When you are able to eat, try clear soups, mild foods, and liquids until all symptoms are gone for 12 to 48 hours. Other good choices include dry toast, crackers, cooked cereal, and gelatin dessert, such as Jell-O. When should you call for help? Call 911 anytime you think you may need emergency care. For example, call if:  ? · You passed out (lost consciousness). ?Call your doctor now or seek immediate medical care if:  ? · You have symptoms of dehydration, such as:  ¨ Dry eyes and a dry mouth. ¨ Passing only a little dark urine. ¨ Feeling thirstier than usual.   ? · You have new or worsening belly pain. ? · You have a new or higher fever. ? · You vomit blood or what looks like coffee grounds. ? Watch closely for changes in your health, and be sure to contact your doctor if:  ? · You have ongoing nausea and vomiting. ? · Your vomiting is getting worse. ? · Your vomiting lasts longer than 2 days. ? · You are not getting better as expected. Where can you learn more? Go to http://kalia-everett.info/. Enter 25 [id number] in the search box to learn more about \"Nausea and Vomiting: Care Instructions. \"  Current as of: March 20, 2017  Content Version: 11.4  © 4691-7603 [employer]. Care instructions adapted under license by [employer] (which disclaims liability or warranty for this information).  If you have questions about a medical condition or this instruction, always ask your healthcare professional. Emily Crawford disclaims any warranty or liability for your use of this information. We hope that we have addressed all of your medical concerns. The examination and treatment you received in the Emergency Department were for an emergent problem and were not intended as complete care. It is important that you follow up with your healthcare provider(s) for ongoing care. If your symptoms worsen or do not improve as expected, and you are unable to reach your usual health care provider(s), you should return to the Emergency Department. Today's healthcare is undergoing tremendous change, and patient satisfaction surveys are one of the many tools to assess the quality of medical care. You may receive a survey from the [employer] regarding your experience in the Emergency Department. I hope that your experience has been completely positive, particularly the medical care that I provided. As such, please participate in the survey; anything less than excellent does not meet my expectations or intentions. [facility] and [address] [place] [address] participate in nationally recognized quality of care measures. If your blood pressure is greater than 120/80, as reported below, we urge that you seek medical care to address the potential of high blood pressure, commonly known as hypertension. Hypertension can be hereditary or can be caused by certain medical conditions, pain, stress, or \"white coat syndrome. \"       Please make an appointment with your health care provider(s) for follow up of your Emergency Department visit. VITALS:   Patient Vitals for the past 8 hrs:   Temp Pulse Resp BP SpO2   01/29/18 1542 98.3 °F (36.8 °C) 84 16 129/81 100 %          Thank you for allowing us to provide you with medical care today. We realize that you have many choices for your emergency care needs. Please choose us in the future for any continued health care needs.       Regards, Kathe Landry, [address].   Office: [phone]            Recent Results (from the past 24 hour(s))   INFLUENZA A & B AG (RAPID TEST)    Collection Time: 01/29/18  3:58 PM   Result Value Ref Range    Influenza A Antigen NEGATIVE  NEG      Influenza B Antigen NEGATIVE  NEG         No results found. Influenza (Flu): Care Instructions  Your Care Instructions    Influenza (flu) is an infection in the lungs and breathing passages. It is caused by the influenza virus. There are different strains, or types, of the flu virus from year to year. Unlike the common cold, the flu comes on suddenly and the symptoms, such as a cough, congestion, fever, chills, fatigue, aches, and pains, are more severe. These symptoms may last up to 10 days. Although the flu can make you feel very sick, it usually doesn't cause serious health problems. Home treatment is usually all you need for flu symptoms. But your doctor may prescribe antiviral medicine to prevent other health problems, such as pneumonia, from developing. Older people and those who have a long-term health condition, such as lung disease, are most at risk for having pneumonia or other health problems. Follow-up care is a key part of your treatment and safety. Be sure to make and go to all appointments, and call your doctor if you are having problems. It's also a good idea to know your test results and keep a list of the medicines you take. How can you care for yourself at home? · Get plenty of rest.  · Drink plenty of fluids, enough so that your urine is light yellow or clear like water. If you have kidney, heart, or liver disease and have to limit fluids, talk with your doctor before you increase the amount of fluids you drink. · Take an over-the-counter pain medicine if needed, such as acetaminophen (Tylenol), ibuprofen (Advil, Motrin), or naproxen (Aleve), to relieve fever, headache, and muscle aches.  Read and follow all instructions on the label. No one younger than 20 should take aspirin. It has been linked to Reye syndrome, a serious illness. · Do not smoke. Smoking can make the flu worse. If you need help quitting, talk to your doctor about stop-smoking programs and medicines. These can increase your chances of quitting for good. · Breathe moist air from a hot shower or from a sink filled with hot water to help clear a stuffy nose. · Before you use cough and cold medicines, check the label. These medicines may not be safe for young children or for people with certain health problems. · If the skin around your nose and lips becomes sore, put some petroleum jelly on the area. · To ease coughing:  ¨ Drink fluids to soothe a scratchy throat. ¨ Suck on cough drops or plain hard candy. ¨ Take an over-the-counter cough medicine that contains dextromethorphan to help you get some sleep. Read and follow all instructions on the label. ¨ Raise your head at night with an extra pillow. This may help you rest if coughing keeps you awake. · Take any prescribed medicine exactly as directed. Call your doctor if you think you are having a problem with your medicine. To avoid spreading the flu  · Wash your hands regularly, and keep your hands away from your face. · Stay home from school, work, and other public places until you are feeling better and your fever has been gone for at least 24 hours. The fever needs to have gone away on its own without the help of medicine. · Ask people living with you to talk to their doctors about preventing the flu. They may get antiviral medicine to keep from getting the flu from you. · To prevent the flu in the future, get a flu vaccine every fall. Encourage people living with you to get the vaccine. · Cover your mouth when you cough or sneeze. When should you call for help? Call 911 anytime you think you may need emergency care. For example, call if:  ? · You have severe trouble breathing. ?Call your doctor now or seek immediate medical care if:  ? · You have new or worse trouble breathing. ? · You seem to be getting much sicker. ? · You feel very sleepy or confused. ? · You have a new or higher fever. ? · You get a new rash. ? Watch closely for changes in your health, and be sure to contact your doctor if:  ? · You begin to get better and then get worse. ? · You are not getting better after 1 week. Where can you learn more? Go to http://kalia-everett.info/. Enter L168 in the search box to learn more about \"Influenza (Flu): Care Instructions. \"  Current as of: May 12, 2017  Content Version: 11.4  © 7517-4657 [employer]. Care instructions adapted under license by [employer] (which disclaims liability or warranty for this information). If you have questions about a medical condition or this instruction, always ask your healthcare professional. [address] any warranty or liability for your use of this information.

## 2018-01-29 NOTE — ED TRIAGE NOTES
Pt reports generalized body aches, chills, cough, nausea and diarrhea. Symptoms began this past Friday. Pt has been taking Theraflu OTC with minimal results. Pt states her children have been sick ast well.

## 2018-01-29 NOTE — LETTER
[address] 
[address] [place], [place] [address] [id number] 
[phone] Work/School Note Date: 1/29/2018 To Whom It May concern: Reinaldo Kilpatrick was seen and treated today in the emergency room by the following provider(s): 
Attending Provider: Magui Viera MD 
Physician Assistant: EMILIO Mena. Reinaldo Kilpatrick may return to work on 1/31/18.  
 
Sincerely, 
 
 
 
 
EMILIO Mena

## 2018-01-29 NOTE — ED PROVIDER NOTES
HPI Comments: 33 y.o. female with past medical history significant for asthma and anemia who presents from home via private vehicle with chief complaint of generalized body aches. Pt reports chills, body aches, nausea, abdominal pain, cough, and green, loose stool for the last 3 days. Pt reports her LMP was in November, notes she had a recent miscarriage on 1/5/18. Pt denies getting a flu shot this year. Pt denies any fevers, vomiting, chest pain, urinary sx, shortness of breath, headache, rash,  sweating or weight loss. There are no other acute medical concerns at this time. Pt notes family members with similar sx    Social hx: Former smoker (Quit 2015); Social EtOH use  PCP: Marco Antonio Gu MD    Note written by Timmy Flowers, as dictated by EMILIO Neely 4:46PM      The history is provided by the patient. No  was used. Past Medical History:   Diagnosis Date    Abnormal Pap smear     Anemia NEC     per pt takes iron PO    Asthma     bronchitis    Breastfeeding (infant)     Complication of anesthesia     never had    HX OTHER MEDICAL     Pregnant       Past Surgical History:   Procedure Laterality Date    HX GYN      c section         Family History:   Problem Relation Age of Onset    Cancer Maternal Grandfather     Diabetes Paternal Grandmother        Social History     Social History    Marital status: SINGLE     Spouse name: N/A    Number of children: N/A    Years of education: N/A     Occupational History    Not on file.      Social History Main Topics    Smoking status: Former Smoker     Packs/day: 0.50     Years: 3.00    Smokeless tobacco: Former User     Quit date: 8/26/2015    Alcohol use Yes      Comment: socially    Drug use: No    Sexual activity: Yes     Partners: Male     Birth control/ protection: Condom, None     Other Topics Concern    Not on file     Social History Narrative         ALLERGIES: Review of patient's allergies indicates no known allergies. Review of Systems   Constitutional: Positive for chills. Negative for diaphoresis, fever and unexpected weight change. HENT: Negative for congestion, rhinorrhea, sneezing and sore throat. Eyes: Negative for redness and visual disturbance. Respiratory: Positive for cough. Negative for shortness of breath. Cardiovascular: Negative for chest pain and leg swelling. Gastrointestinal: Positive for abdominal pain, diarrhea and nausea. Negative for blood in stool, constipation and vomiting. Genitourinary: Negative for decreased urine volume, difficulty urinating, dysuria, frequency, hematuria and urgency. Musculoskeletal: Positive for myalgias. Negative for back pain and neck stiffness. Skin: Negative for rash. Neurological: Negative for dizziness, syncope, weakness and headaches. Hematological: Negative for adenopathy. Vitals:    01/29/18 1542   BP: 129/81   Pulse: 84   Resp: 16   Temp: 98.3 °F (36.8 °C)   SpO2: 100%   Weight: 103.4 kg (228 lb)   Height: 5' 4\" (1.626 m)            Physical Exam   Constitutional: She is oriented to person, place, and time. She appears well-developed and well-nourished. No distress. HENT:   Head: Normocephalic and atraumatic. Right Ear: External ear normal.   Left Ear: External ear normal.   Erythematous boggy nares with clear rhinorrhea. + PND   Eyes: EOM are normal. Pupils are equal, round, and reactive to light. Neck: Neck supple. Cardiovascular: Normal rate, regular rhythm, normal heart sounds and intact distal pulses. Exam reveals no gallop and no friction rub. No murmur heard. Pulmonary/Chest: Effort normal and breath sounds normal. No stridor. No respiratory distress. She has no wheezes. She has no rales. She exhibits no tenderness. Abdominal: Soft. Bowel sounds are normal. She exhibits no distension and no mass. There is no tenderness. There is no rebound and no guarding.    Musculoskeletal: Normal range of motion. She exhibits no edema, tenderness or deformity. Neurological: She is alert and oriented to person, place, and time. No cranial nerve deficit. Coordination normal.   Skin: No rash noted. No erythema. No pallor. Psychiatric: She has a normal mood and affect. Her behavior is normal.   Nursing note and vitals reviewed. MDM  Number of Diagnoses or Management Options  Diarrhea, unspecified type:   Flu-like symptoms:   Nausea without vomiting:   Nicotine dependence, uncomplicated, unspecified nicotine product type:      Amount and/or Complexity of Data Reviewed  Tests in the medicine section of CPT®: ordered and reviewed  Obtain history from someone other than the patient: yes (Significant other)  Review and summarize past medical records: yes  Independent visualization of images, tracings, or specimens: yes    Patient Progress  Patient progress: stable    ED Course       Procedures   4:55 PM  Discussed with the patient the medical risks of prolonged smoking habits and advised the patient of the benefits of the cessation of smoking. The patient verbalized their understanding. EMILIO Marin      5:18 PM  Discussed pt, sx, hx and current findings with Dr Beba Portillo. He is in agreement with plan. Will tx for flu like illness/ viral syndrome   Filiberto Aldrich. ELIZABETH Landry    LABORATORY TESTS:  Recent Results (from the past 12 hour(s))   INFLUENZA A & B AG (RAPID TEST)    Collection Time: 01/29/18  3:58 PM   Result Value Ref Range    Influenza A Antigen NEGATIVE  NEG      Influenza B Antigen NEGATIVE  NEG         IMAGING RESULTS:    No results found. MEDICATIONS GIVEN:  Medications - No data to display    IMPRESSION:  1. Flu-like symptoms    2. Nausea without vomiting    3. Diarrhea, unspecified type    4. Nicotine dependence, uncomplicated, unspecified nicotine product type        PLAN:  1.    Discharge Medication List as of 1/29/2018  5:17 PM      START taking these medications    Details   ondansetron [facility] ODT) 4 mg disintegrating tablet Take 1 Tab by mouth every eight (8) hours as needed for Nausea. , Print, Disp-10 Tab, R-0      dicyclomine (BENTYL) 10 mg capsule Take 1 Cap by mouth four (4) times daily for 5 days. , Print, Disp-20 Cap, R-0         CONTINUE these medications which have NOT CHANGED    Details   methocarbamol (ROBAXIN-750) 750 mg tablet Take 1 Tab by mouth four (4) times daily. , Normal, Disp-20 Tab, R-0      albuterol (PROVENTIL VENTOLIN) 2.5 mg /3 mL (0.083 %) nebulizer solution 3 mL by Nebulization route every four (4) hours as needed for Wheezing., Normal, Disp-24 Each, R-1      Nebulizer & Compressor machine UAD, Normal, Disp-1 Each, R-0           2. Follow-up Information     Follow up With Details Comments Contact Dayana Wolfe MD Schedule an appointment as soon as possible for a visit 2-4 days for recheck [address] [facility]  [phone]          Return to ED if worse     5:18 PM  Pt has been reexamined. Pt has no new complaints, changes or physical findings. Care plan outlined and precautions discussed. All available results were reviewed with pt. All medications were reviewed with pt. All of pt's questions and concerns were addressed. Pt agrees to F/U as instructed and agrees to return to ED upon further deterioration. Pt is ready to go home.   EMILIO Coelho

## 2018-02-01 ENCOUNTER — HOSPITAL ENCOUNTER (EMERGENCY)
Age: 28
Discharge: HOME | End: 2018-02-01
Attending: EMERGENCY MEDICINE | Admitting: EMERGENCY MEDICINE
Payer: MEDICAID

## 2018-02-01 ENCOUNTER — APPOINTMENT (OUTPATIENT)
Dept: GENERAL RADIOLOGY | Age: 28
End: 2018-02-01
Attending: PHYSICIAN ASSISTANT
Payer: MEDICAID

## 2018-02-01 VITALS
HEIGHT: 64 IN | DIASTOLIC BLOOD PRESSURE: 71 MMHG | OXYGEN SATURATION: 98 % | BODY MASS INDEX: 37.56 KG/M2 | TEMPERATURE: 100.7 F | RESPIRATION RATE: 20 BRPM | SYSTOLIC BLOOD PRESSURE: 124 MMHG | HEART RATE: 131 BPM | WEIGHT: 220.02 LBS

## 2018-02-01 DIAGNOSIS — J10.1 INFLUENZA A: Primary | ICD-10-CM

## 2018-02-01 LAB
FLUAV AG NPH QL IA: POSITIVE
FLUBV AG NOSE QL IA: NEGATIVE

## 2018-02-01 PROCEDURE — 74011250637 HC RX REV CODE- 250/637: Performed by: PHYSICIAN ASSISTANT

## 2018-02-01 PROCEDURE — 87804 INFLUENZA ASSAY W/OPTIC: CPT | Performed by: PHYSICIAN ASSISTANT

## 2018-02-01 PROCEDURE — 71046 X-RAY EXAM CHEST 2 VIEWS: CPT

## 2018-02-01 PROCEDURE — 99283 EMERGENCY DEPT VISIT LOW MDM: CPT

## 2018-02-01 RX ORDER — ACETAMINOPHEN 325 MG/1
650 TABLET ORAL ONCE
Status: COMPLETED | OUTPATIENT
Start: 2018-02-01 | End: 2018-02-01

## 2018-02-01 RX ORDER — CODEINE PHOSPHATE AND GUAIFENESIN 10; 100 MG/5ML; MG/5ML
5 SOLUTION ORAL
Qty: 120 ML | Refills: 0 | Status: SHIPPED | OUTPATIENT
Start: 2018-02-01 | End: 2018-02-08

## 2018-02-01 RX ADMIN — ACETAMINOPHEN 650 MG: 325 TABLET ORAL at 09:57

## 2018-02-01 NOTE — ED NOTES
Pt discharged by EMILIO Gao. Pt provided with discharge instructions Rx and instructions on follow up care. Pt out of ED under own power steady gait accompanied by self.

## 2018-02-01 NOTE — LETTER
[address] 
[facility] EMERGENCY DEPT 
[address] [place] [address] [address] [address] 
[phone] Work/School Note Date: 2/1/2018 To Whom It May concern: Henrietta Gutierrez was seen and treated today in the emergency room by the following provider(s): 
Attending Provider: Madison Connor DO Physician Assistant: EMILIO Siddiqui. Please excuse Henrietta Gutierrez from work this week. Sincerely, Shorty Siddiqui

## 2018-02-01 NOTE — ED PROVIDER NOTES
EMERGENCY DEPARTMENT HISTORY AND PHYSICAL EXAM      Date: 2018  Patient Name: Barbara Avendano    History of Presenting Illness     Chief Complaint   Patient presents with    Flu     Via EMS w/ c/o dry cough, chills, fever, body aches, N/V/D x 1.5 weeks     History Provided By: Patient    HPI: Barbara Avendano, 33 y.o. female with PMHx significant for asthma and anemia, presents via EMS to the ED with cc of gradually worsening upper respiratory symptoms over the course of the past week. Pt reports she has been presenting with intermittent fevers and chills with a Tmax of 102 F. Pt presents to the ED with a temperature of 100.5 F reporting having taken Tylenol this morning. Pt additionally informs of an intermittent cough which she notes non-productive. Pt reports she has been increasingly congested and has been presenting with intermittent nausea alongside several episodes of diarrhea. Pt additionally informs of bilateral ear pain, a headache to the frontal head, and generalized body aches. Pt reports the discomfort provided with the aforementioned symptoms presents with a  Moderate-high intensity alongside an associated dull, aching sensation generally. Pt reports her discomfort is increased with bouts of coughing. Pt reports sick contact with her daughter. Pt specifically denies any vomiting, abdominal pain, urinary complications, chest pain, or SOB. PSHx:   Social Hx: + EtOH; Former Smoker; - Illicit Drugs    PCP: Marco Antonio Gu MD    There are no other complaints, changes, or physical findings at this time. Current Outpatient Prescriptions   Medication Sig Dispense Refill    guaiFENesin-codeine (ROBITUSSIN AC) 100-10 mg/5 mL solution Take 5 mL by mouth four (4) times daily as needed for Cough for up to 7 days. Max Daily Amount: 20 mL. 120 mL 0    ondansetron (ZOFRAN ODT) 4 mg disintegrating tablet Take 1 Tab by mouth every eight (8) hours as needed for Nausea.  10 Tab 0    dicyclomine (BENTYL) 10 mg capsule Take 1 Cap by mouth four (4) times daily for 5 days. 20 Cap 0    methocarbamol (ROBAXIN-750) 750 mg tablet Take 1 Tab by mouth four (4) times daily. 20 Tab 0    albuterol (PROVENTIL VENTOLIN) 2.5 mg /3 mL (0.083 %) nebulizer solution 3 mL by Nebulization route every four (4) hours as needed for Wheezing. 24 Each 1    Nebulizer & Compressor machine UAD 1 Each 0     Past History     Past Medical History:  Past Medical History:   Diagnosis Date    Abnormal Pap smear     Anemia NEC     per pt takes iron PO    Asthma     bronchitis    Breastfeeding (infant)     Complication of anesthesia     never had    HX OTHER MEDICAL     Pregnant     Past Surgical History:  Past Surgical History:   Procedure Laterality Date    HX GYN      c section     Family History:  Family History   Problem Relation Age of Onset    Cancer Maternal Grandfather     Diabetes Paternal Grandmother      Social History:  Social History   Substance Use Topics    Smoking status: Former Smoker     Packs/day: 0.50     Years: 3.00    Smokeless tobacco: Former User     Quit date: 8/26/2015    Alcohol use Yes      Comment: socially     Allergies:  No Known Allergies    Review of Systems   Review of Systems   Constitutional: Positive for chills, diaphoresis and fever. HENT: Positive for congestion and ear pain. Negative for rhinorrhea and sore throat. Eyes: Negative for visual disturbance. Respiratory: Positive for cough. Negative for shortness of breath. Cardiovascular: Negative for chest pain. Gastrointestinal: Positive for diarrhea and nausea. Negative for abdominal pain, constipation and vomiting. Genitourinary: Negative for dysuria, frequency, hematuria and urgency. Musculoskeletal: Positive for arthralgias (generalized) and myalgias (generalized). Negative for back pain. Skin: Negative for wound. Neurological: Positive for headaches.    All other systems reviewed and are negative. Physical Exam   Physical Exam   Constitutional: She is oriented to person, place, and time. She appears well-developed and well-nourished. No distress. 34 yo -American female   HENT:   Head: Normocephalic and atraumatic. Right Ear: Tympanic membrane, external ear and ear canal normal. No middle ear effusion. Left Ear: Tympanic membrane, external ear and ear canal normal.  No middle ear effusion. Nose: Rhinorrhea present. Right sinus exhibits no maxillary sinus tenderness and no frontal sinus tenderness. Left sinus exhibits no maxillary sinus tenderness and no frontal sinus tenderness. Mouth/Throat: Uvula is midline, oropharynx is clear and moist and mucous membranes are normal. No oropharyngeal exudate, posterior oropharyngeal edema or posterior oropharyngeal erythema. Eyes: Conjunctivae are normal. Right eye exhibits no discharge. Left eye exhibits no discharge. Neck: Normal range of motion. Neck supple. Cardiovascular: Normal rate, regular rhythm and normal heart sounds. No murmur heard. Pulmonary/Chest: Effort normal and breath sounds normal. No respiratory distress. She has no wheezes. Non-productive cough. Abdominal: Soft. Bowel sounds are normal. She exhibits no distension. There is no tenderness. There is no rebound and no guarding. Neurological: She is alert and oriented to person, place, and time. Skin: Skin is warm and dry. She is not diaphoretic. Psychiatric: She has a normal mood and affect. Her behavior is normal.   Nursing note and vitals reviewed.     Diagnostic Study Results     Labs -     Recent Results (from the past 12 hour(s))   INFLUENZA A & B AG (RAPID TEST)    Collection Time: 02/01/18  9:55 AM   Result Value Ref Range    Influenza A Antigen POSITIVE (A) NEG      Influenza B Antigen NEGATIVE  NEG       Radiologic Studies -   XR CHEST PA LAT   Final Result        CXR Results  (Last 48 hours)               02/01/18 1010  XR CHEST PA LAT Final result    Impression:  Impression: No acute process or change compared to the prior exam.           Narrative:  Exam:  2 view chest       Indication: Dry cough       Comparison to 9/6/2017. PA and lateral views demonstrate normal heart size. There is no acute process in   the lung fields. The osseous structures are unremarkable. Medical Decision Making   I am the first provider for this patient. I reviewed the vital signs, available nursing notes, past medical history, past surgical history, family history and social history. Vital Signs-Reviewed the patient's vital signs. Patient Vitals for the past 12 hrs:   Temp Pulse Resp BP SpO2   02/01/18 0941 (!) 100.5 °F (38.1 °C) (!) 131 20 124/71 99 %     Pulse Oximetry Analysis - 99% on RA    Cardiac Monitor:   Rate: 131 bpm  Rhythm: Normal Sinus Rhythm      Records Reviewed: Nursing Notes and Old Medical Records    Provider Notes (Medical Decision Making):   DDx: influenza, bronchitis, PNA, URI, viral syndrome     ED Course:   Initial assessment performed. The patients presenting problems have been discussed, and they are in agreement with the care plan formulated and outlined with them. I have encouraged them to ask questions as they arise throughout their visit. Disposition:  DISCHARGE NOTE:    10:52 AM  The patient is ready for discharge. The patient signs, symptoms, diagnosis, and discharge instructions have been discussed and the patient has conveyed their understanding. The patient is to follow-up as reccommended or returned to the ER should their symptoms worsen. Plan has been discussed and patient is in agreement. PLAN:  1. Current Discharge Medication List      START taking these medications    Details   guaiFENesin-codeine (ROBITUSSIN AC) 100-10 mg/5 mL solution Take 5 mL by mouth four (4) times daily as needed for Cough for up to 7 days. Max Daily Amount: 20 mL.   Qty: 120 mL, Refills: 0    Associated Diagnoses: Influenza A CONTINUE these medications which have NOT CHANGED    Details   ondansetron (ZOFRAN ODT) 4 mg disintegrating tablet Take 1 Tab by mouth every eight (8) hours as needed for Nausea. Qty: 10 Tab, Refills: 0      dicyclomine (BENTYL) 10 mg capsule Take 1 Cap by mouth four (4) times daily for 5 days. Qty: 20 Cap, Refills: 0      methocarbamol (ROBAXIN-750) 750 mg tablet Take 1 Tab by mouth four (4) times daily. Qty: 20 Tab, Refills: 0      albuterol (PROVENTIL VENTOLIN) 2.5 mg /3 mL (0.083 %) nebulizer solution 3 mL by Nebulization route every four (4) hours as needed for Wheezing. Qty: 24 Each, Refills: 1      Nebulizer & Compressor machine UAD  Qty: 1 Each, Refills: 0           2. Follow-up Information     Follow up With Details Comments Lanie Guadarrama MD In 1 week As needed [address]  [phone]        3. Drink plenty of fluids  4. Take Tylenol/Motrin PRN   Return to ED if worse     Diagnosis     Clinical Impression:   1. Influenza A      Attestations: This note is prepared by Melissa Rahman, acting as Scribe for ELIZABETH Alexandre Lung: The scribe's documentation has been prepared under my direction and personally reviewed by me in its entirety. I confirm that the note above accurately reflects all work, treatment, procedures, and medical decision making performed by me.

## 2018-02-01 NOTE — DISCHARGE INSTRUCTIONS

## 2018-02-02 NOTE — ED NOTES
Pt arrives complaining of cough, chills and fever x week. Pt states she took Tylenol this am for her symptoms/fever. Pt also reports non-productive cough +nasal congestion +nausea +diarrhea + sick contact (daughter)     Pt alert oriented x 4 Skin warm dry intact.     Pt seen by PA Liliane Schirmer Updated on plans with pending x-ray and Flu testing for dispo

## 2018-03-03 ENCOUNTER — HOSPITAL ENCOUNTER (EMERGENCY)
Age: 28
Discharge: HOME | End: 2018-03-03
Attending: INTERNAL MEDICINE
Payer: MEDICAID

## 2018-03-03 VITALS
OXYGEN SATURATION: 98 % | SYSTOLIC BLOOD PRESSURE: 131 MMHG | DIASTOLIC BLOOD PRESSURE: 58 MMHG | HEART RATE: 89 BPM | TEMPERATURE: 98.3 F | HEIGHT: 65 IN | BODY MASS INDEX: 33.32 KG/M2 | RESPIRATION RATE: 16 BRPM | WEIGHT: 200 LBS

## 2018-03-03 DIAGNOSIS — S39.011A STRAIN OF ABDOMINAL MUSCLE, INITIAL ENCOUNTER: ICD-10-CM

## 2018-03-03 DIAGNOSIS — Z32.01 PREGNANCY TEST PERFORMED, PREGNANCY CONFIRMED: Primary | ICD-10-CM

## 2018-03-03 LAB
APPEARANCE UR: ABNORMAL
BACTERIA URNS QL MICRO: ABNORMAL /HPF
BILIRUB UR QL: NEGATIVE
COLOR UR: ABNORMAL
EPITH CASTS URNS QL MICRO: ABNORMAL /LPF
GLUCOSE UR STRIP.AUTO-MCNC: NEGATIVE MG/DL
HCG UR QL: POSITIVE
HGB UR QL STRIP: NEGATIVE
KETONES UR QL STRIP.AUTO: NEGATIVE MG/DL
LEUKOCYTE ESTERASE UR QL STRIP.AUTO: NEGATIVE
NITRITE UR QL STRIP.AUTO: NEGATIVE
PH UR STRIP: 6.5 [PH] (ref 5–8)
PROT UR STRIP-MCNC: NEGATIVE MG/DL
RBC #/AREA URNS HPF: ABNORMAL /HPF (ref 0–5)
SP GR UR REFRACTOMETRY: 1.02 (ref 1–1.03)
UA: UC IF INDICATED,UAUC: ABNORMAL
UROBILINOGEN UR QL STRIP.AUTO: 1 EU/DL (ref 0.2–1)
WBC URNS QL MICRO: ABNORMAL /HPF (ref 0–4)

## 2018-03-03 PROCEDURE — 87086 URINE CULTURE/COLONY COUNT: CPT | Performed by: INTERNAL MEDICINE

## 2018-03-03 PROCEDURE — 99283 EMERGENCY DEPT VISIT LOW MDM: CPT

## 2018-03-03 PROCEDURE — 81001 URINALYSIS AUTO W/SCOPE: CPT | Performed by: INTERNAL MEDICINE

## 2018-03-03 PROCEDURE — 87186 SC STD MICRODIL/AGAR DIL: CPT | Performed by: INTERNAL MEDICINE

## 2018-03-03 PROCEDURE — 81025 URINE PREGNANCY TEST: CPT

## 2018-03-03 PROCEDURE — 87077 CULTURE AEROBIC IDENTIFY: CPT | Performed by: INTERNAL MEDICINE

## 2018-03-03 NOTE — ED PROVIDER NOTES
EMERGENCY DEPARTMENT HISTORY AND PHYSICAL EXAM      Date: 3/3/2018  Patient Name: Ashley Membreno    History of Presenting Illness     Chief Complaint   Patient presents with    Abdominal Pain     lower abdomen. Started last night- patient is 8 weeks pregnant and recently had a miscarriage       History Provided By: Patient    HPI: Ashley Membreno, 33 y.o. female with PMHx significant for Asthma and anemia, presents ambulatory to the ED with cc of 5/10, suprapubic abdominal pain since last night. Pt explains that she is currently 8 weeks pregnant, and she is  A4. Pt states that she has been pregnant 3 times within the past month, but her last 3 pregnancies resulted in a miscarriage. Pt notes that she was lifting and carrying heavy boxes one day last week, but she denies any other recent heavy lifting. Pt reports that her OBGYN is doctor Daniela Gillis, and she has not been evaluated by him for her current symptoms. Pt states that her next OBGYN appointment is in 2 days. Pt specifically denies dysuria or vaginal bleeding. PCP: Luiz Hernandez MD    There are no other complaints, changes, or physical findings at this time. Current Outpatient Prescriptions   Medication Sig Dispense Refill    ondansetron (ZOFRAN ODT) 4 mg disintegrating tablet Take 1 Tab by mouth every eight (8) hours as needed for Nausea. 10 Tab 0    methocarbamol (ROBAXIN-750) 750 mg tablet Take 1 Tab by mouth four (4) times daily. 20 Tab 0    albuterol (PROVENTIL VENTOLIN) 2.5 mg /3 mL (0.083 %) nebulizer solution 3 mL by Nebulization route every four (4) hours as needed for Wheezing.  24 Each 1    Nebulizer & Compressor machine UAD 1 Each 0       Past History     Past Medical History:  Past Medical History:   Diagnosis Date    Abnormal Pap smear     Anemia NEC     per pt takes iron PO    Asthma     bronchitis    Breastfeeding (infant)     Complication of anesthesia     never had    HX OTHER MEDICAL     Pregnant Past Surgical History:  Past Surgical History:   Procedure Laterality Date    HX GYN      c section       Family History:  Family History   Problem Relation Age of Onset    Cancer Maternal Grandfather     Diabetes Paternal Grandmother        Social History:  Social History   Substance Use Topics    Smoking status: Former Smoker     Packs/day: 0.50     Years: 3.00    Smokeless tobacco: Former User     Quit date: 8/26/2015    Alcohol use Yes      Comment: socially       Allergies:  No Known Allergies      Review of Systems   Review of Systems   Constitutional: Negative. Negative for activity change, appetite change, chills, fatigue, fever and unexpected weight change. HENT: Negative. Negative for congestion, hearing loss, rhinorrhea, sneezing and voice change. Eyes: Negative. Negative for pain and visual disturbance. Respiratory: Negative. Negative for apnea, cough, choking, chest tightness and shortness of breath. Cardiovascular: Negative. Negative for chest pain and palpitations. Gastrointestinal: Positive for abdominal pain. Negative for abdominal distention, blood in stool, diarrhea, nausea and vomiting. Genitourinary: Negative. Negative for difficulty urinating, dysuria, flank pain, frequency, urgency, vaginal bleeding and vaginal discharge. No discharge   Musculoskeletal: Negative. Negative for arthralgias, back pain, myalgias and neck stiffness. Skin: Negative. Negative for color change and rash. Neurological: Negative. Negative for dizziness, seizures, syncope, speech difficulty, weakness, numbness and headaches. Hematological: Negative for adenopathy. Psychiatric/Behavioral: Negative. Negative for agitation, behavioral problems, dysphoric mood and suicidal ideas. The patient is not nervous/anxious. Physical Exam   Physical Exam   Constitutional: She is oriented to person, place, and time. She appears well-developed and well-nourished.    Cardiovascular: Normal rate, regular rhythm and normal heart sounds. Exam reveals no gallop and no friction rub. No murmur heard. Pulmonary/Chest: Effort normal and breath sounds normal. No respiratory distress. She has no wheezes. She has no rales. Abdominal: Soft. Bowel sounds are normal. She exhibits no distension. There is no rebound and no guarding. Mild suprapubic tenderness   Neurological: She is alert and oriented to person, place, and time. Skin: Skin is warm and dry. No ecchymosis, no lesion and no rash noted. Rash is not urticarial. She is not diaphoretic. No erythema. Psychiatric: She has a normal mood and affect. Nursing note and vitals reviewed. Diagnostic Study Results     Labs -     Recent Results (from the past 12 hour(s))   HCG URINE, QL. - POC    Collection Time: 03/03/18 12:34 PM   Result Value Ref Range    Pregnancy test,urine (POC) POSITIVE (A) NEG     URINALYSIS W/ REFLEX CULTURE    Collection Time: 03/03/18 12:39 PM   Result Value Ref Range    Color YELLOW/STRAW      Appearance CLOUDY (A) CLEAR      Specific gravity 1.025 1.003 - 1.030      pH (UA) 6.5 5.0 - 8.0      Protein NEGATIVE  NEG mg/dL    Glucose NEGATIVE  NEG mg/dL    Ketone NEGATIVE  NEG mg/dL    Bilirubin NEGATIVE  NEG      Blood NEGATIVE  NEG      Urobilinogen 1.0 0.2 - 1.0 EU/dL    Nitrites NEGATIVE  NEG      Leukocyte Esterase NEGATIVE  NEG      WBC 0-4 0 - 4 /hpf    RBC 0-5 0 - 5 /hpf    Epithelial cells MODERATE (A) FEW /lpf    Bacteria 1+ (A) NEG /hpf    UA:UC IF INDICATED URINE CULTURE ORDERED (A) CNI       Medical Decision Making   I am the first provider for this patient. I reviewed the vital signs, available nursing notes, past medical history, past surgical history, family history and social history. Vital Signs-Reviewed the patient's vital signs.   Patient Vitals for the past 12 hrs:   Temp Pulse Resp BP SpO2   03/03/18 1225 98.3 °F (36.8 °C) 89 16 131/58 98 %       Pulse Oximetry Analysis - 98% on RA    Cardiac Monitor:   Rate: 89 bpm  Rhythm: Normal Sinus Rhythm     Records Reviewed: Nursing Notes, Old Medical Records, Previous Radiology Studies and Previous Laboratory Studies    Provider Notes (Medical Decision Making):   DDx: UTI, pregnancy, pelvic strain    ED Course:   Initial assessment performed. The patients presenting problems have been discussed, and they are in agreement with the care plan formulated and outlined with them. I have encouraged them to ask questions as they arise throughout their visit. Disposition:  1:00 PM  The patient has been re-evaluated and is ready for discharge. Reviewed available results with patient. Counseled patient on diagnosis and care plan. Patient has expressed understanding, and all questions have been answered. Patient agrees with plan and agrees to follow up as recommended, or return to the ED if their symptoms worsen. Discharge instructions have been provided and explained to the patient, along with reasons to return to the ED. PLAN:  1. Follow-up Information     Follow up With Details Comments Contact Info    Rosemarie Stevenson MD In 1 day  [address] [facility]  [phone]          Return to ED if worse     Diagnosis     Clinical Impression:   1. Pregnancy test performed, pregnancy confirmed    2. Strain of abdominal muscle, initial encounter        Attestations: This note is prepared by Anabel Bradshaw, acting as Scribe for Baljeet Robertson MD.    Baljeet Robertson MD: The scribe's documentation has been prepared under my direction and personally reviewed by me in its entirety. I confirm that the note above accurately reflects all work, treatment, procedures, and medical decision making performed by me.

## 2018-03-03 NOTE — DISCHARGE INSTRUCTIONS
Belly Pain in Pregnancy: Care Instructions  Your Care Instructions    When you're pregnant, any belly pain can be a worry. You may not want to call your doctor about every pain you have. But you don't want to miss something that is dangerous for you or your baby. Even if it feels familiar, belly pain can mean something new when you're pregnant. It's important to know when to call your doctor. It will also help to know how to care for yourself at home when your pain is not caused by anything harmful. · When belly pain is more severe or constant, see a doctor right away. · If you're sure your belly pain is a sign of labor, call your doctor. · When belly pain is brief, it's usually a normal part of pregnancy. It might be related to changes in the growing uterus. Or it could be the stretching of ligaments called round ligaments. These ligaments help support the uterus. Round ligament pain can be on either side of your belly. It can also be felt in your hips or groin. Follow-up care is a key part of your treatment and safety. Be sure to make and go to all appointments, and call your doctor if you are having problems. It's also a good idea to know your test results and keep a list of the medicines you take. How can you tell if belly pain is a sign of labor? When belly pain is caused by labor, it can feel like mild or menstrual-like cramps in your lower belly. These cramps are probably contractions. They can happen in your second or third trimester. You may also have:  · A steady, dull ache in your lower back, pelvis, or thighs. · A feeling of pressure in your pelvis or lower belly. · Changes in your vaginal discharge or a sudden release of fluid from the vagina. If you think you are in labor, call your doctor. How can you care for yourself at home? When belly pain is mild and is not a symptom of labor:  · Rest until you feel better. · Take a warm bath.   · Think about what you drink and eat:  ¨ Drink plenty of fluids. Choose water and other caffeine-free clear liquids until you feel better. ¨ Try eating small, frequent meals. If your stomach is upset, try bland, low-fat foods like plain rice, broiled chicken, toast, and yogurt. · Think about how you move if you are having brief pains from stretching of the round ligaments. ¨ Try gentle stretching. ¨ Move a little more slowly when turning in bed or getting up from a chair, so those ligaments don't stretch quickly. ¨ Lean forward a bit if you think you are going to cough or sneeze. When should you call for help? Call 911 anytime you think you may need emergency care. For example, call if:  ? · You have sudden, severe pain in your belly. ? · You have severe vaginal bleeding. ?Call your doctor now or seek immediate medical care if:  ? · You have new or worse belly pain or cramping. ? · You have any vaginal bleeding. ? · You have a fever. ? · You have symptoms of preeclampsia, such as:  ¨ Sudden swelling of your face, hands, or feet. ¨ New vision problems (such as dimness or blurring). ¨ A severe headache. ? · You think that you may be in labor. This means that you've had at least 8 contractions within 1 hour or at least 4 contractions within 20 minutes, even after you change your position and drink fluids. ? · You have symptoms of a urinary tract infection. These may include:  ¨ Pain or burning when you urinate. ¨ A frequent need to urinate without being able to pass much urine. ¨ Pain in the flank, which is just below the rib cage and above the waist on either side of the back. ¨ Blood in your urine. ? Watch closely for changes in your health, and be sure to contact your doctor if you are worried about your or your baby's health. Where can you learn more? Go to http://kalia-everett.info/. Enter [phone] in the search box to learn more about \"Belly Pain in Pregnancy: Care Instructions. \"  Current as of: March 16, 2017  Content Version: 11.4  © 6736-1066 [employer]. Care instructions adapted under license by [employer] (which disclaims liability or warranty for this information). If you have questions about a medical condition or this instruction, always ask your healthcare professional. [address] any warranty or liability for your use of this information.

## 2018-03-03 NOTE — ED NOTES
Patient (s)   given copy of dc instructions and   script(s). Patient(s)   verbalized understanding of instructions and script (s). Patient given a current medication reconciliation form and verbalized understanding of their medications. Patient (s)  verbalized understanding of the importance of discussing medications with  his or her physician or clinic when they follow up. Patient alert and oriented and in no acute distress. Pt verbalizes pain scale of 7 out of 10. Patient discharged home ambulatory with ignacio Rosa

## 2018-03-03 NOTE — ED NOTES
Emergency Department Nursing Plan of Care       The Nursing Plan of Care is developed from the Nursing assessment and Emergency Department Attending provider initial evaluation. The plan of care may be reviewed in the ED Provider note.     The Plan of Care was developed with the following considerations:   Patient / Family readiness to learn indicated by:verbalized understanding  Persons(s) to be included in education: patient  Barriers to Learning/Limitations:No    Signed     Erica Figueroa    3/3/2018   12:30 PM

## 2018-03-05 LAB
BACTERIA SPEC CULT: ABNORMAL
CC UR VC: ABNORMAL
SERVICE CMNT-IMP: ABNORMAL

## 2018-03-06 RX ORDER — NITROFURANTOIN 25; 75 MG/1; MG/1
100 CAPSULE ORAL 2 TIMES DAILY
Qty: 14 CAP | Refills: 0 | Status: SHIPPED | OUTPATIENT
Start: 2018-03-06 | End: 2018-03-13

## 2019-02-12 ENCOUNTER — HOSPITAL ENCOUNTER (EMERGENCY)
Age: 29
Discharge: HOME | End: 2019-02-12
Attending: EMERGENCY MEDICINE | Admitting: EMERGENCY MEDICINE
Payer: MEDICAID

## 2019-02-12 VITALS
WEIGHT: 220 LBS | SYSTOLIC BLOOD PRESSURE: 123 MMHG | DIASTOLIC BLOOD PRESSURE: 79 MMHG | HEART RATE: 92 BPM | HEIGHT: 65 IN | TEMPERATURE: 97.4 F | RESPIRATION RATE: 16 BRPM | OXYGEN SATURATION: 100 % | BODY MASS INDEX: 36.65 KG/M2
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
## 2019-02-12 DIAGNOSIS — S39.012A STRAIN OF LUMBAR REGION, INITIAL ENCOUNTER: ICD-10-CM

## 2019-02-12 PROCEDURE — 74011250636 HC RX REV CODE- 250/636: Performed by: NURSE PRACTITIONER

## 2019-02-12 PROCEDURE — 96372 THER/PROPH/DIAG INJ SC/IM: CPT

## 2019-02-12 PROCEDURE — 99282 EMERGENCY DEPT VISIT SF MDM: CPT

## 2019-02-12 RX ORDER — IBUPROFEN 800 MG/1
800 TABLET ORAL
Qty: 20 TAB | Refills: 0 | Status: SHIPPED | OUTPATIENT
Start: 2019-02-12 | End: 2019-05-08 | Stop reason: SDUPTHER

## 2019-02-12 RX ORDER — KETOROLAC TROMETHAMINE 30 MG/ML
60 INJECTION, SOLUTION INTRAMUSCULAR; INTRAVENOUS
Status: COMPLETED | OUTPATIENT
Start: 2019-02-12 | End: 2019-02-12

## 2019-02-12 RX ORDER — METHOCARBAMOL 750 MG/1
750 TABLET, FILM COATED ORAL 4 TIMES DAILY
Qty: 28 TAB | Refills: 0 | Status: SHIPPED | OUTPATIENT
Start: 2019-02-12 | End: 2020-04-17 | Stop reason: ALTCHOICE

## 2019-02-12 RX ADMIN — KETOROLAC TROMETHAMINE 60 MG: 30 INJECTION INTRAMUSCULAR; INTRAVENOUS at 13:00

## 2019-02-12 NOTE — DISCHARGE INSTRUCTIONS
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
## 2019-02-12 NOTE — ED NOTES
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
## 2019-02-12 NOTE — LETTER
[facility] EMERGENCY DEPT 
[phone] 
[phone] Work/School Note Date: 2/12/2019 To Whom It May concern: Quang Villareal was seen and treated today in the emergency room by the following provider(s): 
Attending Provider: Luiz Zamarripa MD 
Nurse Practitioner: Susan Singh NP. Quang Villareal may return to work on 2/15/19. Sincerely, Theresa Spaulding NP

## 2019-03-13 ENCOUNTER — HOSPITAL ENCOUNTER (EMERGENCY)
Age: 29
Discharge: HOME | End: 2019-03-13
Attending: EMERGENCY MEDICINE
Payer: MEDICAID

## 2019-03-13 VITALS
HEART RATE: 87 BPM | WEIGHT: 215 LBS | HEIGHT: 65 IN | BODY MASS INDEX: 35.82 KG/M2 | DIASTOLIC BLOOD PRESSURE: 89 MMHG | SYSTOLIC BLOOD PRESSURE: 142 MMHG | TEMPERATURE: 97.8 F | RESPIRATION RATE: 18 BRPM | OXYGEN SATURATION: 100 %

## 2019-03-13 DIAGNOSIS — J11.1 INFLUENZA: Primary | ICD-10-CM

## 2019-03-13 PROCEDURE — 99282 EMERGENCY DEPT VISIT SF MDM: CPT

## 2019-03-13 RX ORDER — IBUPROFEN 800 MG/1
800 TABLET ORAL
Qty: 30 TAB | Refills: 0 | Status: SHIPPED | OUTPATIENT
Start: 2019-03-13 | End: 2020-04-17 | Stop reason: ALTCHOICE

## 2019-03-13 RX ORDER — OSELTAMIVIR PHOSPHATE 75 MG/1
75 CAPSULE ORAL 2 TIMES DAILY
Qty: 10 CAP | Refills: 0 | Status: SHIPPED | OUTPATIENT
Start: 2019-03-13 | End: 2019-03-18

## 2019-03-13 RX ORDER — ONDANSETRON 4 MG/1
4 TABLET, ORALLY DISINTEGRATING ORAL
Qty: 10 TAB | Refills: 0 | Status: SHIPPED | OUTPATIENT
Start: 2019-03-13 | End: 2019-05-08

## 2019-03-13 NOTE — DISCHARGE INSTRUCTIONS
Patient Education        Influenza (Flu): Care Instructions  Your Care Instructions    Influenza (flu) is an infection in the lungs and breathing passages. It is caused by the influenza virus. There are different strains, or types, of the flu virus from year to year. Unlike the common cold, the flu comes on suddenly and the symptoms, such as a cough, congestion, fever, chills, fatigue, aches, and pains, are more severe. These symptoms may last up to 10 days. Although the flu can make you feel very sick, it usually doesn't cause serious health problems. Home treatment is usually all you need for flu symptoms. But your doctor may prescribe antiviral medicine to prevent other health problems, such as pneumonia, from developing. Older people and those who have a long-term health condition, such as lung disease, are most at risk for having pneumonia or other health problems. Follow-up care is a key part of your treatment and safety. Be sure to make and go to all appointments, and call your doctor if you are having problems. It's also a good idea to know your test results and keep a list of the medicines you take. How can you care for yourself at home? · Get plenty of rest.  · Drink plenty of fluids, enough so that your urine is light yellow or clear like water. If you have kidney, heart, or liver disease and have to limit fluids, talk with your doctor before you increase the amount of fluids you drink. · Take an over-the-counter pain medicine if needed, such as acetaminophen (Tylenol), ibuprofen (Advil, Motrin), or naproxen (Aleve), to relieve fever, headache, and muscle aches. Read and follow all instructions on the label. No one younger than 20 should take aspirin. It has been linked to Reye syndrome, a serious illness. · Do not smoke. Smoking can make the flu worse. If you need help quitting, talk to your doctor about stop-smoking programs and medicines.  These can increase your chances of quitting for good.  · Breathe moist air from a hot shower or from a sink filled with hot water to help clear a stuffy nose. · Before you use cough and cold medicines, check the label. These medicines may not be safe for young children or for people with certain health problems. · If the skin around your nose and lips becomes sore, put some petroleum jelly on the area. · To ease coughing:  ? Drink fluids to soothe a scratchy throat. ? Suck on cough drops or plain hard candy. ? Take an over-the-counter cough medicine that contains dextromethorphan to help you get some sleep. Read and follow all instructions on the label. ? Raise your head at night with an extra pillow. This may help you rest if coughing keeps you awake. · Take any prescribed medicine exactly as directed. Call your doctor if you think you are having a problem with your medicine. To avoid spreading the flu  · Wash your hands regularly, and keep your hands away from your face. · Stay home from school, work, and other public places until you are feeling better and your fever has been gone for at least 24 hours. The fever needs to have gone away on its own without the help of medicine. · Ask people living with you to talk to their doctors about preventing the flu. They may get antiviral medicine to keep from getting the flu from you. · To prevent the flu in the future, get a flu vaccine every fall. Encourage people living with you to get the vaccine. · Cover your mouth when you cough or sneeze. When should you call for help? Call 911 anytime you think you may need emergency care.  For example, call if:    · You have severe trouble breathing.    Call your doctor now or seek immediate medical care if:    · You have new or worse trouble breathing.     · You seem to be getting much sicker.     · You feel very sleepy or confused.     · You have a new or higher fever.     · You get a new rash.    Watch closely for changes in your health, and be sure to contact your doctor if:    · You begin to get better and then get worse.     · You are not getting better after 1 week. Where can you learn more? Go to http://kalia-everett.info/. Enter X862 in the search box to learn more about \"Influenza (Flu): Care Instructions. \"  Current as of: September 5, 2018  Content Version: 11.9  © 6824-5290 [employer]. Care instructions adapted under license by "[employer] (which disclaims liability or warranty for this information). If you have questions about a medical condition or this instruction, always ask your healthcare professional. [place] [address] any warranty or liability for your use of this information.

## 2019-03-13 NOTE — ED PROVIDER NOTES
EMERGENCY DEPARTMENT HISTORY AND PHYSICAL EXAM      Date: 3/13/2019  Patient Name: Hermann John    History of Presenting Illness     Chief Complaint   Patient presents with    Flu Like Symptoms     History Provided By: Patient    HPI: Hermann John, 33 y.o. female with no PMHx who presents via private vehicle to the ED with cc of cough, vomiting, body aches, and chills for the last 2 days. Patient denies any fever or any known sick contacts. Patient states she thinks she has a flu. 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 She endorses getting a flu shot this year. Her body pain is generalized without any radiation. She has not taking anything for her symptoms and nothing makes the pain worse. PMHx: None  PSHx: None  Social Hx: Denies EtOH; Non-Smoker; Denies Illicit Drugs    PCP: Michael Avila MD    There are no other complaints, changes, or physical findings at this time. No current facility-administered medications on file prior to encounter. Current Outpatient Medications on File Prior to Encounter   Medication Sig Dispense Refill    methocarbamol (ROBAXIN-750) 750 mg tablet Take 1 Tab by mouth four (4) times daily. 28 Tab 0    ibuprofen (MOTRIN) 800 mg tablet Take 1 Tab by mouth every six (6) hours as needed for Pain. 20 Tab 0    ondansetron (ZOFRAN ODT) 4 mg disintegrating tablet Take 1 Tab by mouth every eight (8) hours as needed for Nausea. 10 Tab 0    albuterol (PROVENTIL VENTOLIN) 2.5 mg /3 mL (0.083 %) nebulizer solution 3 mL by Nebulization route every four (4) hours as needed for Wheezing.  24 Each 1    Nebulizer & Compressor machine UAD 1 Each 0     Past History     Past Medical History:  Past Medical History:   Diagnosis Date    Abnormal Pap smear     Anemia NEC     per pt takes iron PO    Asthma     bronchitis    Breastfeeding (infant)     Complication of anesthesia     never had   [address] P[address]ant     Past Surgical History:  Past Surgical History:   Procedure Laterality Date    HX GYN      c section     Family History:  Family History   Problem Relation Age of Onset    Cancer Maternal Grandfather     Diabetes Paternal Grandmother      Social History:  Social History     Tobacco Use    Smoking status: Former Smoker     Packs/day: 0.50     Years: 3.00     Pack years: 1.50    Smokeless tobacco: Former User     Quit date: 8/26/2015   Substance Use Topics    Alcohol use: Yes     Comment: socially    Drug use: No     Allergies:  No Known Allergies  Review of Systems   Review of Systems   Constitutional: Positive for activity change, chills and fatigue. Negative for fever. HENT: Negative for congestion, rhinorrhea, sneezing and sore throat. Respiratory: Positive for cough. Negative for shortness of breath and wheezing. Cardiovascular: Negative for chest pain. Gastrointestinal: Positive for nausea and vomiting. Negative for abdominal pain. Musculoskeletal: Negative for back pain, myalgias and neck stiffness. Skin: Negative for rash. Neurological: Positive for weakness. Negative for dizziness and headaches. All other systems reviewed and are negative. Physical Exam   Physical Exam   Constitutional: She is oriented to person, place, and time. She appears well-developed and well-nourished. She appears ill (but non-toxic). HENT:   Head: Normocephalic and atraumatic. Mouth/Throat: Oropharynx is clear and moist.   Eyes: Conjunctivae and EOM are normal.   Neck: Normal range of motion and full passive range of motion without pain. Neck supple. Cardiovascular: Normal rate, regular rhythm, S1 normal, S2 normal, normal heart sounds, intact distal pulses and normal pulses. No murmur heard. Pulmonary/Chest: Effort normal and breath sounds normal. No respiratory distress. She has no wheezes. Abdominal: Soft. Normal appearance and bowel sounds are normal. She exhibits no distension. There is no tenderness. There is no rebound. Musculoskeletal: Normal range of motion. Neurological: She is alert and oriented to person, place, and time. She has normal strength. Skin: Skin is warm, dry and intact. No rash noted. Psychiatric: She has a normal mood and affect. Her speech is normal and behavior is normal. Judgment and thought content normal.   Nursing note and vitals reviewed. Diagnostic Study Results   Labs -   No results found for this or any previous visit (from the past 12 hour(s)). Radiologic Studies -   No orders to display     No results found. Medical Decision Making   I am the first provider for this patient. I reviewed the vital signs, available nursing notes, past medical history, past surgical history, family history and social history. Vital Signs-Reviewed the patient's vital signs. Patient Vitals for the past 12 hrs:   Temp Pulse Resp BP SpO2   03/13/19 0659 97.8 °F (36.6 °C) 87 18 142/89 100 %       Pulse Oximetry Analysis -100 % on room air    Records Reviewed: Nursing Notes    Provider Notes (Medical Decision Making):   32-year-old female with 2-day history of viral illness, suspect flu. Will empirically treat with Tamiflu, NSAIDs, and Zofran ODT. Discussed with patient who agrees with this plan. ED Course:   Initial assessment performed. The patients presenting problems have been discussed, and they are in agreement with the care plan formulated and outlined with them. I have encouraged them to ask questions as they arise throughout their visit. Progress Note:   Updated pt on all returned results and findings. Discussed the importance of proper follow up as referred below along with return precautions. Pt in agreement with the care plan and expresses agreement with and understanding of all items discussed. Disposition:  Discharge Note:  8:05 AM  The pt is ready for discharge.  The pt's signs, symptoms, diagnosis, and discharge instructions have been discussed and pt has conveyed their understanding. The pt is to follow up as recommended or return to ER should their symptoms worsen. Plan has been discussed and pt is in agreement. PLAN:  1. Discharge Medication List as of 3/13/2019  7:18 AM      START taking these medications    Details   oseltamivir (TAMIFLU) 75 mg capsule Take 1 Cap by mouth two (2) times a day for 5 days. , Normal, Disp-10 Cap, R-0      !! ibuprofen (MOTRIN) 800 mg tablet Take 1 Tab by mouth every eight (8) hours as needed for Pain., Normal, Disp-30 Tab, R-0       !! - Potential duplicate medications found. Please discuss with provider. CONTINUE these medications which have CHANGED    Details   ondansetron (ZOFRAN ODT) 4 mg disintegrating tablet Take 1 Tab by mouth every eight (8) hours as needed for Nausea., Normal, Disp-10 Tab, R-0         CONTINUE these medications which have NOT CHANGED    Details   methocarbamol (ROBAXIN-750) 750 mg tablet Take 1 Tab by mouth four (4) times daily. , Normal, Disp-28 Tab, R-0      !! ibuprofen (MOTRIN) 800 mg tablet Take 1 Tab by mouth every six (6) hours as needed for Pain., Normal, Disp-20 Tab, R-0      albuterol (PROVENTIL VENTOLIN) 2.5 mg /3 mL (0.083 %) nebulizer solution 3 mL by Nebulization route every four (4) hours as needed for Wheezing., Normal, Disp-24 Each, R-1      Nebulizer & Compressor machine UAD, Normal, Disp-1 Each, R-0       !! - Potential duplicate medications found. Please discuss with provider. 2.   Follow-up Information     Follow up With Specialties Details Why Contact Info    mick Flores MD Family Practice Schedule an appointment as soon as possible for a visit  Marisol Mann [address]      [facility] EMERGENCY DEPT Emergency Medicine  As needed, If symptoms worsen [place]  [phone]        Return to ED if worse     Diagnosis     Clinical Impression:   1.  Influenza          This note will not be viewable in [address]

## 2019-03-13 NOTE — ED NOTES
Patient presents to ED with c/o cough, body aches and nausea for two days. pleasant and cooperative. Emergency Department Nursing Plan of Care       The Nursing Plan of Care is developed from the Nursing assessment and Emergency Department Attending provider initial evaluation. The plan of care may be reviewed in the ED Provider note.     The Plan of Care was developed with the following considerations:   Patient / Family readiness to learn indicated by:verbalized understanding  Persons(s) to be included in education: patient  Barriers to Learning/Limitations:No    [address], RN    3/13/2019   7:40 AM

## 2019-03-13 NOTE — LETTER
[facility] EMERGENCY DEPT 
[address] [facility] [id number] 
[phone] Work/School Note Date: 3/13/2019 To Whom It May concern: Felice Mae was seen and treated today in the emergency room by the following provider(s): 
Attending Provider: Jie Del Angel MD. Felice Mae may return to work on 03/17/2019.  
 
Sincerely, 
 
 
 
 
Ashley Gale MD

## 2019-05-08 ENCOUNTER — OFFICE VISIT (OUTPATIENT)
Dept: INTERNAL MEDICINE CLINIC | Age: 29
End: 2019-05-08

## 2019-05-08 VITALS
TEMPERATURE: 98.4 F | BODY MASS INDEX: 39.22 KG/M2 | HEIGHT: 65 IN | HEART RATE: 88 BPM | RESPIRATION RATE: 20 BRPM | OXYGEN SATURATION: 97 % | DIASTOLIC BLOOD PRESSURE: 71 MMHG | SYSTOLIC BLOOD PRESSURE: 105 MMHG | WEIGHT: 235.4 LBS

## 2019-05-08 DIAGNOSIS — E66.01 SEVERE OBESITY (HCC): ICD-10-CM

## 2019-05-08 DIAGNOSIS — M54.2 NECK PAIN: ICD-10-CM

## 2019-05-08 DIAGNOSIS — F32.A DEPRESSION, UNSPECIFIED DEPRESSION TYPE: ICD-10-CM

## 2019-05-08 DIAGNOSIS — Z79.899 ENCOUNTER FOR LONG-TERM (CURRENT) USE OF OTHER MEDICATIONS: ICD-10-CM

## 2019-05-08 DIAGNOSIS — R07.9 CHEST PAIN, UNSPECIFIED TYPE: ICD-10-CM

## 2019-05-08 DIAGNOSIS — F41.9 ANXIETY: Primary | ICD-10-CM

## 2019-05-08 DIAGNOSIS — Z13.220 SCREENING FOR LIPOID DISORDERS: ICD-10-CM

## 2019-05-08 DIAGNOSIS — D50.9 IRON DEFICIENCY ANEMIA, UNSPECIFIED IRON DEFICIENCY ANEMIA TYPE: ICD-10-CM

## 2019-05-08 PROBLEM — N92.0 EXCESS, MENSTRUATION: Status: ACTIVE | Noted: 2019-05-08

## 2019-05-08 RX ORDER — IBUPROFEN 800 MG/1
800 TABLET ORAL
Qty: 20 TAB | Refills: 2 | Status: SHIPPED | OUTPATIENT
Start: 2019-05-08 | End: 2020-04-17 | Stop reason: ALTCHOICE

## 2019-05-08 RX ORDER — BUSPIRONE HYDROCHLORIDE 7.5 MG/1
7.5 TABLET ORAL 3 TIMES DAILY
Qty: 90 TAB | Refills: 1 | Status: SHIPPED | OUTPATIENT
Start: 2019-05-08 | End: 2019-09-16 | Stop reason: SDUPTHER

## 2019-05-08 NOTE — PROGRESS NOTES
SPORTS MEDICINE AND PRIMARY CARE  Delano Salas. MD Sarah  [address] [address]    Subjective: Norbert Workman is a 33 y.o. female LMP 19  who presents for follow up of of anxiety disorder, depression. Seeing therapist x 3 weeks, [employer]. She has the following anxiety symptoms: sweating, neck pain, chest pain, shortness of breath, dizziness, paresthesias, insomnia, racing thoughts, psychomotor agitation, feelings of losing control, difficulty concentrating, homocidal ideation. Onset of symptoms was approximately 6 months ago, gradually worsening since that time. She denies current suicidal and homicidal ideation. Family history significant for heart disease MGF. Possible organic causes contributing are: none. Risk factors: negative life event -not mentioned  Previous treatment includes meds and individual therapy. She complains of the following side effects from the treatment: none. Not breastfeeding, stopped producing after 1 month. Problem List:     Patient Active Problem List    Diagnosis Date Noted    Severe obesity ([facility] [address] 2019    Excess, menstruation 2019    False labor 10/23/2012    Abdominal pain, epigastric 2011     Medical History:     Past Medical History:   Diagnosis Date    Abnormal Pap smear     Anemia NEC     per pt takes iron PO    Asthma     bronchitis    Breastfeeding (infant)     Complication of anesthesia     never had    HX OTHER MEDICAL     Pregnant     Allergies:   No Known Allergies   Medications:     Current Outpatient Medications   Medication Sig    ibuprofen (MOTRIN) 800 mg tablet Take 1 Tab by mouth every eight (8) hours as needed for Pain.  methocarbamol (ROBAXIN-750) 750 mg tablet Take 1 Tab by mouth four (4) times daily.  ibuprofen (MOTRIN) 800 mg tablet Take 1 Tab by mouth every six (6) hours as needed for Pain. No current facility-administered medications for this visit.       Surgical History: Past Surgical History:   Procedure Laterality Date    HX GYN      c section     Social History:     Social History     Socioeconomic History    Marital status:      Spouse name: Not on file    Number of children: Not on file    Years of education: Not on file    Highest education level: Not on file   Tobacco Use    Smoking status: Current Every Day Smoker     Packs/day: 0.50     Years: 3.00     Pack years: 1.50    Smokeless tobacco: Former User     Quit date: 8/26/2015   Substance and Sexual Activity    Alcohol use: Not Currently     Comment: socially    Drug use: No    Sexual activity: Yes     Partners: Male     Birth control/protection: None       Review of Systems  Pertinent items are noted in HPI. Objective:     Visit Vitals  /71   Pulse 88   Temp 98.4 °F (36.9 °C) (Oral)   Resp 20   Ht 5' 5\" (1.651 m)   Wt 235 lb 6.4 oz (106.8 kg)   LMP 05/08/2019   SpO2 97%   BMI 39.17 kg/m²        General:  alert, cooperative, no distress, appears stated age   Head:  NCAT w/o lesions or tenderness   Mouth:  Lips, mucosa, and tongue normal. Teeth and gums normal   Lungs: clear to auscultation bilaterally   Heart:  regular rate and rhythm, S1, S2 normal, no murmur, click, rub or gallop   Abdomen: soft, non-tender. Bowel sounds normal. No masses,  no organomegaly   Neuro:  normal without focal findings  mental status, speech normal, alert and oriented x iii  MELINDA  reflexes normal and symmetric   MMSE: {not done   Affect/Behavior:  good grooming, full facial expressions, normal speech pattern and content, normal thought patterns, normal perception, normal reasoning             Assessment/ Plan:       ICD-10-CM ICD-9-CM    1. Anxiety F41.9 300.00 TSH REFLEX TO T4      busPIRone (BUSPAR) 7.5 mg tablet   2. Severe obesity ([address] E66.01 278.01    3. Depression, unspecified depression type F32.9 311 TSH REFLEX TO T4   4. Screening for lipoid disorders Z13.220 V77.91 LIPID PANEL   5.  Encounter for long-term (current) use of other medications H56.141 U06.64 METABOLIC PANEL, COMPREHENSIVE      HCG QL SERUM   6. Iron deficiency anemia, unspecified iron deficiency anemia type D50.9 280.9 CBC WITH AUTOMATED DIFF      IRON      FERRITIN   7. Neck pain M54.2 723.1 ibuprofen (MOTRIN) 800 mg tablet   8. Chest pain, unspecified type R07.9 786.50 moist heat relaxation techniques   9. RTO 1 month after starting buspar       Patient Education:  Reviewed concept of anxiety as biochemical imbalance of neurotransmitters and rationale for treatment. Instructed patient to contact office or on-call physician promptly should condition worsen or any new symptoms appear and provided on-call telephone numbers. IF THE PATIENT HAS ANY SUICIDAL OR HOMICIDAL IDEATION, CALL THE OFFICE, DISCUSS WITH A SUPPORT MEMBER OR GO TO THE ER IMMEDIATELY- pt said they would.

## 2019-05-08 NOTE — PATIENT INSTRUCTIONS
Adjustment Disorder: Care Instructions  Your Care Instructions    Adjustment disorder means that you have emotional or behavioral problems because of stress. But your response to the stress is far more severe than a normal response. It is severe enough to affect your work or social life and may cause depression and physical pains and problems. Events that may cause this response can include a divorce, money problems, or starting school or a new job. It might be anything that causes some stress. This disorder is most often a short-term problem. It happens within 3 months of the stressful event or change. If the response lasts longer than 6 months after the event ends, you may have a more serious disorder. Follow-up care is a key part of your treatment and safety. Be sure to make and go to all appointments, and call your doctor if you are having problems. It's also a good idea to know your test results and keep a list of the medicines you take. How can you care for yourself at home? · Go to all counseling sessions. Do not skip any because you are feeling better. · If your doctor prescribed medicines, take them exactly as prescribed. Call your doctor if you think you are having a problem with your medicine. You will get more details on the specific medicines your doctor prescribes. · Discuss the causes of your stress with a good friend or family member. Or you can join a support group for people with similar problems. Talking to others sometimes relieves stress. · Get at least 30 minutes of exercise on most days of the week. Walking is a good choice. You also may want to do other activities, such as running, swimming, cycling, or playing tennis or team sports. Relaxation techniques  Do relaxation exercises 10 to 20 minutes a day. You can play soothing, relaxing music while you do them, if you wish. · Tell others in your house that you are going to do your relaxation exercises.  Ask them not to disturb you.  · Find a comfortable, quiet place. · Lie down on your back, or sit with your back straight. · Focus on your breathing. Make it slow and steady. · Breathe in through your nose. Breathe out through either your nose or mouth. · Breathe deeply, filling up the area between your navel and your rib cage. Breathe so that your belly goes up and down. · Do not hold your breath. · Breathe like this for 5 to 10 minutes. Notice the feeling of calmness throughout your whole body. As you continue to breathe slowly and deeply, relax by doing these next steps for another 5 to 10 minutes:  · Tighten and relax each muscle group in your body. Start at your toes, and work your way up to your head. · Imagine your muscle groups relaxing and getting heavy. · Empty your mind of all thoughts. · Let yourself relax more and more deeply. · Be aware of the state of calmness that surrounds you. · When your relaxation time is over, you can bring yourself back to alertness by moving your fingers and toes. Then move your hands and feet. And then move your entire body. Sometimes people fall asleep during relaxation. But they most often wake up soon. · Always give yourself time to return to full alertness before you drive a car. Wait to do anything that might cause an accident if you are not fully alert. Never play a relaxation tape while you drive a car. When should you call for help? Call 911 anytime you think you may need emergency care. For example, call if:    · You feel you cannot stop from hurting yourself or someone else. Keep the numbers for these national suicide hotlines: 4-283-819-TALK (1-935.955.3840) and 5-908-MODZNOJ (1-240.719.8916).  If you or someone you know talks about suicide or feeling hopeless, get help right away.    Watch closely for changes in your health, and be sure to contact your doctor if:    · You have new anxiety, or your anxiety gets worse.     · You have been feeling sad, depressed, or hopeless or have lost interest in things that you usually enjoy.     · You do not get better as expected. Where can you learn more? Go to http://kalia-everett.info/. Enter 0688 [id number] in the search box to learn more about \"Adjustment Disorder: Care Instructions. \"  Current as of: June 28, 2018  Content Version: 11.9  © 4158-1071 [employer]. Care instructions adapted under license by [employer] (which disclaims liability or warranty for this information). If you have questions about a medical condition or this instruction, always ask your healthcare professional. [place] [address] any warranty or liability for your use of this information. Learning About Generalized Anxiety Disorder  What is generalized anxiety disorder? We all worry. It's a normal part of life. But when you have generalized anxiety disorder, you worry about lots of things and have a hard time stopping your worry. This worry or anxiety interferes with your relationships, work, and life. What causes it? The cause is not known. But it may be passed down through families. What are the symptoms? You may feel anxious or worry most days about things like work, relationships, health, or money. You may worry about things that are unlikely to happen. You find it hard to stop or control the worry. Because you worry a lot and try hard to stop worrying, you may feel restless, tired, tense, or cranky. You may also find it hard to think or sleep. And you may have headaches or an upset stomach. How is it diagnosed? Your doctor will ask about your health and how often you worry or feel anxious. He or she may ask about other symptoms, like whether you:  · Feel restless. · Feel tired. · Have a hard time thinking or feel that your mind goes blank. · Feel cranky. · Have tense muscles. · Have sleep problems.   A physical exam and tests can help make sure that your symptoms aren't caused by a different condition, such as a thyroid problem. How is it treated? Counseling and medicine can both work to treat anxiety. The two are often used along with lifestyle changes. Cognitive-behavioral therapy (CBT) is a type of counseling that's used to help treat anxiety. In CBT, you learn how to notice and replace thoughts that make you feel worried. It also can help you learn how to relax when you worry. Medicines can help. These medicines are often also used for depression. Selective serotonin reuptake inhibitors (SSRIs) are often tried first. But there are other medicines that your doctor may use. You may need to try a few medicines to find one that works well. Many people feel better by getting regular exercise, eating healthy meals, and getting good sleep. Mindfulness--focusing on things that happen in the present moment--also can help reduce your anxiety. What can you expect when you have it? Having anxiety can be upsetting. Some people might feel less worried and stressed after a couple of months of treatment. But for other people, it might take longer to feel better. Reaching out to people for help is important. Try not to isolate yourself. Let your family and friends help you. Find someone you can trust and confide in. Talk to that person. When you know what anxiety is--and how you can get help for it--you can start to learn new ways of thinking. This can help you cope and work through your anxiety. Follow-up care is a key part of your treatment and safety. Be sure to make and go to all appointments, and call your doctor if you are having problems. It's also a good idea to know your test results and keep a list of the medicines you take. Where can you learn more? Go to http://kalia-everett.info/. Enter G110 in the search box to learn more about \"Learning About Generalized Anxiety Disorder. \"  Current as of: September 11, 2018  Content Version: 11.9  © 8772-8375 [employer]. Care instructions adapted under license by [employer] (which disclaims liability or warranty for this information). If you have questions about a medical condition or this instruction, always ask your healthcare professional. [address] any warranty or liability for your use of this information. Buspirone (By mouth)   Buspirone ([id number])  Treats anxiety. Brand Name(s):   There may be other brand names for this medicine. When This Medicine Should Not Be Used: You should not use this medicine if you have had an allergic reaction to buspirone. How to Use This Medicine:   Tablet  · Your doctor will tell you how much of this medicine to take and how often. Do not take more medicine or take it more often than your doctor tells you to. · You may take this medicine with or without food, but take it the same way each time. · You may need to take this medicine for 1 or 2 weeks before you begin to feel better. If a dose is missed:   · If you miss a dose or forget to take your medicine, take it as soon as you can. If it is almost time for your next dose, wait until then to take the medicine and skip the missed dose. · Do not use extra medicine to make up for a missed dose. How to Store and Dispose of This Medicine:   · Store the medicine at room temperature, away from heat, moisture, and direct light. · Keep all medicine out of the reach of children and never share your medicine with anyone. Drugs and Foods to Avoid:   Ask your doctor or pharmacist before using any other medicine, including over-the-counter medicines, vitamins, and herbal products. · You should not use buspirone when you are also using an MAO inhibitor (such as Eldepryl®, Marplan®, Nardil®, Parnate®). · Do not eat grapefruit, drink grapefruit juice, or drink alcohol while you are using buspirone.   · Make sure your doctor knows if you are also using cimetidine (Avie Noss), dexamethasone (Decadron®), diltiazem (Ry Kyung), erythromycin (Erythro-Tab®), itraconazole (Sporanox®), nefazodone (Serzone®), rifampin (Rifadin®, Rifamate®, Rifater®), verapamil (Calan®, Covera®), or medicine for seizures (such as Dilantin®, Luminal®, Tegretol®). · Make sure your doctor knows if you are using any medicines that make you sleepy (such as sleeping pills, cold and allergy medicine, narcotic pain relievers, or sedatives). Warnings While Using This Medicine:   · Make sure your doctor knows if you are pregnant or breastfeeding, or if you have kidney or liver disease. · Do not stop using this medicine suddenly without asking your doctor. You may need to slowly decrease your dose before stopping it completely. · This medicine may make you dizzy or drowsy. Avoid driving, using machines, or doing anything else that could be dangerous if you are not alert. Possible Side Effects While Using This Medicine:   Call your doctor right away if you notice any of these side effects:  · Fast or pounding heartbeat  · Numbness or tingling feeling  · Tremors or shaking  If you notice these less serious side effects, talk with your doctor:   · Drowsiness or weakness  · Dry mouth  · Feeling restless or nervous, trouble sleeping  · Headache  · Nausea, constipation, upset stomach  If you notice other side effects that you think are caused by this medicine, tell your doctor. Call your doctor for medical advice about side effects. You may report side effects to FDA at 0-982-FDA-6409  © 2017 [facility] Information is for End User's use only and may not be sold, redistributed or otherwise used for commercial purposes. The above information is an  only. It is not intended as medical advice for individual conditions or treatments. Talk to your doctor, nurse or pharmacist before following any medical regimen to see if it is safe and effective for you.        Learning About Mindfulness for Stress  What are mindfulness and stress? Stress is what you feel when you have to handle more than you are used to. A lot of things can cause stress. You may feel stress when you go on a job interview, take a test, or run a race. This kind of short-term stress is normal and even useful. It can help you if you need to work hard or react quickly. Stress also can last a long time. Long-term stress is caused by stressful situations or events. Examples of long-term stress include long-term health problems, ongoing problems at work, and conflicts in your family. Long-term stress can harm your health. Mindfulness is a focus only on things happening in the present moment. It's a process of purposefully paying attention to and being aware of your surroundings, your emotions, your thoughts, and how your body feels. You are aware of these things, but you aren't judging these experiences as \"good\" or \"bad. \" Mindfulness can help you learn to calm your mind and body to help you cope with illness, pain, and stress. How does mindfulness help to relieve stress? Mindfulness can help quiet your mind and relax your body. Studies show that it can help some people sleep better, feel less anxious, and bring their blood pressure down. And it's been shown to help some people live and cope better with certain health problems like heart disease, depression, chronic pain, and cancer. How do you practice mindfulness? To be mindful is to pay attention, to be present, and to be accepting. · When you're mindful, you do just one thing and you pay close attention to that one thing. For example, you may sit quietly and notice your emotions or how your food tastes and smells. · When you're present, you focus on the things that are happening right now. You let go of your thoughts about the past and the future. When you dwell on the past or the future, you miss moments that can heal and strengthen you.  You may miss moments like hearing a child laugh or seeing a friendly face when you think you're all alone. · When you're accepting, you don't  the present moment. Instead you accept your thoughts and feelings as they come. You can practice anytime, anywhere, and in any way you choose. You can practice in many ways. Here are a few ideas:  · While doing your chores, like washing the dishes, let your mind focus on what's in your hand. What does the dish feel like? Is the water warm or cold? · Go outside and take a few deep breaths. What is the air like? Is it warm or cold? · When you can, take some time at the start of your day to sit alone and think. · Take a slow walk by yourself. Count your steps while you breathe in and out. · Try yoga breathing exercises, stretches, and poses to strengthen and relax your muscles. · At work, if you can, try to stop for a few moments each hour. Note how your body feels. Let yourself regroup and let your mind settle before you return to what you were doing. · If you struggle with anxiety or \"worry thoughts,\" imagine your mind as a blue rachele and your worry thoughts as clouds. Now imagine those worry thoughts floating across your mind's rachele. Just let them pass by as you watch. Follow-up care is a key part of your treatment and safety. Be sure to make and go to all appointments, and call your doctor if you are having problems. It's also a good idea to know your test results and keep a list of the medicines you take. Where can you learn more? Go to http://kalia-everett.info/. Enter M439 in the search box to learn more about \"Learning About Mindfulness for Stress. \"  Current as of: June 28, 2018  Content Version: 11.9  © 6582-1917 [employer]. Care instructions adapted under license by [employer] (which disclaims liability or warranty for this information).  If you have questions about a medical condition or this instruction, always ask your healthcare professional. [employer] disclaims any warranty or liability for your use of this information. Learning About Progressive Muscle Relaxation for Stress  What are progressive muscle relaxation and stress? Stress is what you feel when you have to handle more than you are used to. A lot of things can cause stress. You may feel stress when you go on a job interview, take a test, or run a race. This kind of short-term stress is normal and even useful. It can help you if you need to work hard or react quickly. Stress also can last a long time. Long-term stress is caused by stressful situations or events. Examples of long-term stress include long-term health problems, ongoing problems at work, and conflicts in your family. Long-term stress can harm your health. When you have anxiety or stress in your life, one of the ways your body responds is with muscle tension. Progressive muscle relaxation is a method that helps relieve that tension. How does progressive muscle relaxation reduce stress? The body responds to stress with muscle tension, which can cause pain or discomfort. In turn, tense muscles relay to the body that it's stressed. That keeps the cycle of stress and muscle tension going. Progressive muscle relaxation helps break this cycle by reducing muscle tension and general mental anxiety. This method often helps people get to sleep. How do you do progressive muscle relaxation? To do progressive muscle relaxation, first you tense a group of muscles as you breathe in. Then you relax them as you breathe out. Notice how your muscles feel when you relax them. You work on your muscle groups in a certain order. Through practice, you can learn to feel the difference between a tensed muscle and a relaxed muscle. Then you can learn how to turn on this relaxed state at the first sign of a muscle tensing up due to stress. Practicing this method for a few weeks will help you get better at this skill.  In time you'll be able to use this method to relieve stress. When you first start, it may help to use an audio recording until you learn all the muscle groups in order. Check online for these recordings. Choose a place where you won't be interrupted. It should have space for you to lie down on your back and stretch out comfortably, such as on a carpeted floor. Follow-up care is a key part of your treatment and safety. Be sure to make and go to all appointments, and call your doctor if you are having problems. It's also a good idea to know your test results and keep a list of the medicines you take. Where can you learn more? Go to http://kalia-everett.info/. Enter F776 in the search box to learn more about \"Learning About Progressive Muscle Relaxation for Stress. \"  Current as of: June 28, 2018  Content Version: 11.9  © 0924-7368 [employer]. Care instructions adapted under license by [employer] (which disclaims liability or warranty for this information). If you have questions about a medical condition or this instruction, always ask your healthcare professional. [address] any warranty or liability for your use of this information.

## 2019-05-08 NOTE — PROGRESS NOTES
1. Have you been to the ER, urgent care clinic since your last visit? Hospitalized since your last visit? Yes When: 4-27-19 Reason for visit: chest pains    2. Have you seen or consulted any other health care providers outside of the [address] [place] [address] since your last visit? Include any pap smears or colon screening.  Yes Where: [facility]     Wants to discuss anxiety

## 2019-05-09 LAB
ALBUMIN SERPL-MCNC: 4 G/DL (ref 3.5–5.5)
ALBUMIN/GLOB SERPL: 1.4 {RATIO} (ref 1.2–2.2)
ALP SERPL-CCNC: 99 IU/L (ref 39–117)
ALT SERPL-CCNC: 9 IU/L (ref 0–32)
AST SERPL-CCNC: 12 IU/L (ref 0–40)
B-HCG SERPL QL: NEGATIVE MIU/ML
BASOPHILS # BLD AUTO: 0 X10E3/UL (ref 0–0.2)
BASOPHILS NFR BLD AUTO: 0 %
BILIRUB SERPL-MCNC: <0.2 MG/DL (ref 0–1.2)
BUN SERPL-MCNC: 10 MG/DL (ref 6–20)
BUN/CREAT SERPL: 15 (ref 9–23)
CALCIUM SERPL-MCNC: 9.3 MG/DL (ref 8.7–10.2)
CHLORIDE SERPL-SCNC: 105 MMOL/L (ref 96–106)
CHOLEST SERPL-MCNC: 136 MG/DL (ref 100–199)
CO2 SERPL-SCNC: 21 MMOL/L (ref 20–29)
CREAT SERPL-MCNC: 0.65 MG/DL (ref 0.57–1)
EOSINOPHIL # BLD AUTO: 0.1 X10E3/UL (ref 0–0.4)
EOSINOPHIL NFR BLD AUTO: 1 %
ERYTHROCYTE [DISTWIDTH] IN BLOOD BY AUTOMATED COUNT: 19.2 % (ref 12.3–15.4)
FERRITIN SERPL-MCNC: 8 NG/ML (ref 15–150)
GLOBULIN SER CALC-MCNC: 2.9 G/DL (ref 1.5–4.5)
GLUCOSE SERPL-MCNC: 86 MG/DL (ref 65–99)
HCT VFR BLD AUTO: 33.2 % (ref 34–46.6)
HDLC SERPL-MCNC: 29 MG/DL
HGB BLD-MCNC: 9.4 G/DL (ref 11.1–15.9)
IMM GRANULOCYTES # BLD AUTO: 0 X10E3/UL (ref 0–0.1)
IMM GRANULOCYTES NFR BLD AUTO: 0 %
IRON SERPL-MCNC: 15 UG/DL (ref 27–159)
LDLC SERPL CALC-MCNC: 89 MG/DL (ref 0–99)
LYMPHOCYTES # BLD AUTO: 2.1 X10E3/UL (ref 0.7–3.1)
LYMPHOCYTES NFR BLD AUTO: 25 %
MCH RBC QN AUTO: 19 PG (ref 26.6–33)
MCHC RBC AUTO-ENTMCNC: 28.3 G/DL (ref 31.5–35.7)
MCV RBC AUTO: 67 FL (ref 79–97)
MONOCYTES # BLD AUTO: 0.7 X10E3/UL (ref 0.1–0.9)
MONOCYTES NFR BLD AUTO: 8 %
NEUTROPHILS # BLD AUTO: 5.6 X10E3/UL (ref 1.4–7)
NEUTROPHILS NFR BLD AUTO: 66 %
PLATELET # BLD AUTO: 338 X10E3/UL (ref 150–379)
POTASSIUM SERPL-SCNC: 4.7 MMOL/L (ref 3.5–5.2)
PROT SERPL-MCNC: 6.9 G/DL (ref 6–8.5)
RBC # BLD AUTO: 4.95 X10E6/UL (ref 3.77–5.28)
SODIUM SERPL-SCNC: 142 MMOL/L (ref 134–144)
TRIGL SERPL-MCNC: 92 MG/DL (ref 0–149)
TSH SERPL DL<=0.005 MIU/L-ACNC: 0.62 UIU/ML (ref 0.45–4.5)
VLDLC SERPL CALC-MCNC: 18 MG/DL (ref 5–40)
WBC # BLD AUTO: 8.6 X10E3/UL (ref 3.4–10.8)

## 2019-06-06 ENCOUNTER — OFFICE VISIT (OUTPATIENT)
Dept: INTERNAL MEDICINE CLINIC | Age: 29
End: 2019-06-06

## 2019-06-06 VITALS
HEART RATE: 92 BPM | OXYGEN SATURATION: 94 % | HEIGHT: 65 IN | BODY MASS INDEX: 39.74 KG/M2 | WEIGHT: 238.5 LBS | DIASTOLIC BLOOD PRESSURE: 73 MMHG | RESPIRATION RATE: 16 BRPM | SYSTOLIC BLOOD PRESSURE: 111 MMHG | TEMPERATURE: 98.6 F

## 2019-06-06 DIAGNOSIS — F41.9 ANXIETY: ICD-10-CM

## 2019-06-06 DIAGNOSIS — Z79.899 ENCOUNTER FOR LONG-TERM (CURRENT) USE OF OTHER MEDICATIONS: ICD-10-CM

## 2019-06-06 DIAGNOSIS — F32.1 CURRENT MODERATE EPISODE OF MAJOR DEPRESSIVE DISORDER WITHOUT PRIOR EPISODE (HCC): Primary | ICD-10-CM

## 2019-06-06 NOTE — PATIENT INSTRUCTIONS
Learning about Antidepressants  What are antidepressants? Antidepressants are medicines that change the levels of some chemicals in the brain. They can help affect moods. There are different types that work on brain chemicals in different ways. These medicines can help treat depression. They are also used for anxiety, eating disorders, post-traumatic stress disorder, and chronic pain. What are some examples? Here are some examples of antidepressants. For each item in the list, the generic name is first, followed by any brand names. · amitriptyline  · bupropion (Wellbutrin)  · citalopram (Celexa)  · fluoxetine (Prozac)  · sertraline (Zoloft)  · venlafaxine (Effexor)  This is not a complete list of antidepressants. What are the side effects? Side effects may vary. They depend on the medicine you take. But common ones include:  · Nausea. · Dry mouth. · Loss of appetite. · Diarrhea or constipation. · Sexual problems. (These may include loss of desire or erection problems.)  · Headaches. · Trouble falling asleep. Or you may wake up a lot at night. · Weight gain. · Feeling nervous or on edge. · Feeling drowsy in the daytime. Problems with sexual arousal and a lack of interest in sex are common side effects. If this happens to you, talk to your doctor. There are other medicines that may help with these problems. Mild side effects may go away after you take the medicine for a few weeks. If the side effects bother you, talk with your doctor. He or she may prescribe a different medicine. Or the doctor may suggest ways to manage your side effects. How do you take antidepressants safely? If your doctor has prescribed antidepressants, here are some tips to help you get the most from your medicine. · Share your health history with your doctor. Tell your doctor about your other medicines and health conditions. This information can affect which antidepressant your doctor prescribes for you.  Tell your doctor if you or anyone in your family has had bipolar disorder or used antidepressants before. · Take your medicine as prescribed. It works best and has fewer side effects when you take it exactly as your doctor prescribed. If you miss a dose, and if it's not too late in the day, it's okay to take it. Don't double up doses. · Keep taking your medicine for a while. Taking it for at least 6 months after you feel better can help keep you from getting symptoms again. · Be prepared to try different medicines and doses. You may start to feel better within 1 to 3 weeks after you start the medicine. If you have not improved at all in 3 weeks, your doctor may increase your dose. Or you may need to try a different medicine. Over time, your doctor may need to adjust your dose again to manage your symptoms better. · Don't take any new medicines unless you talk to your doctor first.   Even common medicines like aspirin and some vitamins and herbs can cause problems if you use them while you take antidepressants. · Do not drink alcohol. It can make the side effects worse. · Don't stop taking your medicine on your own. Quitting antidepressants too quickly can cause withdrawal symptoms. It can also cause depression to return. If you are having a problem with your medicine or are ready to quit taking it, work with your doctor. He or she can help you to slowly reduce the dose over a span of a few weeks. · Call your doctor right away for serious side effects. Examples include:  ? Warning signs of suicide. These include talking or writing about death, giving away belongings, or withdrawing from family and friends. ? Manic behavior. This includes having very high energy, sleeping less than normal, being impulsive, or being grouchy or restless. How might you feel about taking antidepressants? You may feel nervous, relieved, or a little surprised that your doctor is talking to you about antidepressants.  And the thought of needing to take medicine for a long time can be scary. But whether you are depressed or you have another condition, you are taking a step to help yourself feel better. Follow-up care is a key part of your treatment and safety. Be sure to make and go to all appointments, and call your doctor if you are having problems. It's also a good idea to know your test results and keep a list of the medicines you take. Where can you learn more? Go to http://kalia-everett.info/. Enter A230 in the search box to learn more about \"Learning about Antidepressants. \"  Current as of: September 11, 2018  Content Version: 11.9  © 3411-9761 [employer]. Care instructions adapted under license by [employer] (which disclaims liability or warranty for this information). If you have questions about a medical condition or this instruction, always ask your healthcare professional. [place] [address] any warranty or liability for your use of this information. Learning about Antidepressants  What are antidepressants? Antidepressants are medicines that change the levels of some chemicals in the brain. They can help affect moods. There are different types that work on brain chemicals in different ways. These medicines can help treat depression. They are also used for anxiety, eating disorders, post-traumatic stress disorder, and chronic pain. What are some examples? Here are some examples of antidepressants. For each item in the list, the generic name is first, followed by any brand names. · amitriptyline  · bupropion (Wellbutrin)  · citalopram (Celexa)  · fluoxetine (Prozac)  · sertraline (Zoloft)  · venlafaxine (Effexor)  This is not a complete list of antidepressants. What are the side effects? Side effects may vary. They depend on the medicine you take. But common ones include:  · Nausea. · Dry mouth. · Loss of appetite.   · Diarrhea or constipation. · Sexual problems. (These may include loss of desire or erection problems.)  · Headaches. · Trouble falling asleep. Or you may wake up a lot at night. · Weight gain. · Feeling nervous or on edge. · Feeling drowsy in the daytime. Problems with sexual arousal and a lack of interest in sex are common side effects. If this happens to you, talk to your doctor. There are other medicines that may help with these problems. Mild side effects may go away after you take the medicine for a few weeks. If the side effects bother you, talk with your doctor. He or she may prescribe a different medicine. Or the doctor may suggest ways to manage your side effects. How do you take antidepressants safely? If your doctor has prescribed antidepressants, here are some tips to help you get the most from your medicine. · Share your health history with your doctor. Tell your doctor about your other medicines and health conditions. This information can affect which antidepressant your doctor prescribes for you. Tell your doctor if you or anyone in your family has had bipolar disorder or used antidepressants before. · Take your medicine as prescribed. It works best and has fewer side effects when you take it exactly as your doctor prescribed. If you miss a dose, and if it's not too late in the day, it's okay to take it. Don't double up doses. · Keep taking your medicine for a while. Taking it for at least 6 months after you feel better can help keep you from getting symptoms again. · Be prepared to try different medicines and doses. You may start to feel better within 1 to 3 weeks after you start the medicine. If you have not improved at all in 3 weeks, your doctor may increase your dose. Or you may need to try a different medicine. Over time, your doctor may need to adjust your dose again to manage your symptoms better.   · Don't take any new medicines unless you talk to your doctor first.   Even common medicines like aspirin and some vitamins and herbs can cause problems if you use them while you take antidepressants. · Do not drink alcohol. It can make the side effects worse. · Don't stop taking your medicine on your own. Quitting antidepressants too quickly can cause withdrawal symptoms. It can also cause depression to return. If you are having a problem with your medicine or are ready to quit taking it, work with your doctor. He or she can help you to slowly reduce the dose over a span of a few weeks. · Call your doctor right away for serious side effects. Examples include:  ? Warning signs of suicide. These include talking or writing about death, giving away belongings, or withdrawing from family and friends. ? Manic behavior. This includes having very high energy, sleeping less than normal, being impulsive, or being grouchy or restless. How might you feel about taking antidepressants? You may feel nervous, relieved, or a little surprised that your doctor is talking to you about antidepressants. And the thought of needing to take medicine for a long time can be scary. But whether you are depressed or you have another condition, you are taking a step to help yourself feel better. Follow-up care is a key part of your treatment and safety. Be sure to make and go to all appointments, and call your doctor if you are having problems. It's also a good idea to know your test results and keep a list of the medicines you take. Where can you learn more? Go to http://kalia-everett.info/. Enter A230 in the search box to learn more about \"Learning about Antidepressants. \"  Current as of: September 11, 2018  Content Version: 11.9  © 7784-5914 [employer]. Care instructions adapted under license by [employer] (which disclaims liability or warranty for this information).  If you have questions about a medical condition or this instruction, always ask your healthcare professional. [address] any warranty or liability for your use of this information.

## 2019-06-06 NOTE — PROGRESS NOTES
Chief Complaint   Patient presents with    Medication Evaluation     therapist thinks she needs to be on anti depressant medication         1. Have you been to the ER, urgent care clinic since your last visit? Hospitalized since your last visit? [facility]  For [facility] infection    2. Have you seen or consulted any other health care providers outside of the [address] [place] [address] since your last visit? Include any pap smears or colon screening.  no

## 2019-06-07 RX ORDER — ESCITALOPRAM OXALATE 10 MG/1
10 TABLET ORAL DAILY
Qty: 30 TAB | Refills: 1 | Status: SHIPPED | OUTPATIENT
Start: 2019-06-07 | End: 2019-09-16 | Stop reason: SDUPTHER

## 2019-06-07 NOTE — PROGRESS NOTES
SPORTS MEDICINE AND PRIMARY CARE  Steve Martinez. MD Sarah  [address] [address]    Chief Complaint   Patient presents with    Medication Evaluation     therapist thinks she needs to be on anti depressant medication       SUBJECTIVE:    Everardo Grigsby is a 33 y.o. female with anxiety and depression who began using buspar after last visit. The medication helped relieve anxiety but her symptoms of depression have escalated and she now wants an antidepressant after being hesitant about taking one in the past.  She feels that her anxiety and depression stem from poor relatoinships with family members. Denies suicidal thoughts. Current Outpatient Medications   Medication Sig Dispense Refill           busPIRone (BUSPAR) 7.5 mg tablet Take 1 Tab by mouth three (3) times daily. 90 Tab 1    ibuprofen (MOTRIN) 800 mg tablet Take 1 Tab by mouth every six (6) hours as needed for Pain. 20 Tab 2    ibuprofen (MOTRIN) 800 mg tablet Take 1 Tab by mouth every eight (8) hours as needed for Pain. 30 Tab 0    methocarbamol (ROBAXIN-750) 750 mg tablet Take 1 Tab by mouth four (4) times daily.  28 Tab 0     Past Medical History:   Diagnosis Date    Abnormal Pap smear     Anemia NEC     per pt takes iron PO    Asthma     bronchitis    Breastfeeding (infant)     Complication of anesthesia     never had    HX OTHER MEDICAL     Pregnant     Past Surgical History:   Procedure Laterality Date    HX GYN      c section     No Known Allergies    REVIEW OF SYSTEMS:     ENT: + headaches,  Gastrointestinal: negative for - abdominal pain or nausea/vomiting  Integument: negative for rash      Social History     Socioeconomic History    Marital status:      Spouse name: Not on file    Number of children: 10    Years of education: Not on file    Highest education level: Not on file   Tobacco Use    Smoking status: Current Every Day Smoker     Packs/day: 0.50     Years: 3.00     Pack years: 1.50    Smokeless tobacco: Never Used   Substance and Sexual Activity    Alcohol use: Not Currently    Drug use: No    Sexual activity: Yes     Partners: Male     Birth control/protection: None     Family History   Problem Relation Age of Onset    Cancer Maternal Grandfather     Diabetes Paternal Grandmother     Diabetes Father     Hypertension Father        OBJECTIVE:     Visit Vitals  /73 (BP 1 Location: Left arm, BP Patient Position: Sitting)   Pulse 92   Temp 98.6 °F (37 °C) (Oral)   Resp 16   Ht 5' 5\" (1.651 m)   Wt 238 lb 8 oz (108.2 kg)   LMP 05/07/2019   SpO2 94%   BMI 39.69 kg/m²     CONSTITUTIONAL: well developed, well nourished, appears age appropriate  EYES: perrla, eom intact  NECK: supple. RESPIRATORY: Chest: clear bilaterally  CARDIOVASCULAR: Heart: regular rate and rhythm  Extremities: no edema   INTEGUMENT: No unusual rashes or suspicious skin lesions noted. NEUROLOGIC: non-focal exam   MENTAL STATUS: alert and oriented, appropriate affect     Office Visit on 05/08/2019   Component Date Value Ref Range Status    WBC 05/08/2019 8.6  3.4 - 10.8 x10E3/uL Final    RBC 05/08/2019 4.95  3.77 - 5.28 x10E6/uL Final    HGB 05/08/2019 9.4* 11.1 - 15.9 g/dL Final    HCT 05/08/2019 33.2* 34.0 - 46.6 % Final    MCV 05/08/2019 67* 79 - 97 fL Final    MCH 05/08/2019 19.0* 26.6 - 33.0 pg Final    MCHC 05/08/2019 28.3* 31.5 - 35.7 g/dL Final    RDW 05/08/2019 19.2* 12.3 - 15.4 % Final    PLATELET 95/81/6621 576  150 - 379 x10E3/uL Final    NEUTROPHILS 05/08/2019 66  Not Estab. % Final    Lymphocytes 05/08/2019 25  Not Estab. % Final    MONOCYTES 05/08/2019 8  Not Estab. % Final    EOSINOPHILS 05/08/2019 1  Not Estab. % Final    BASOPHILS 05/08/2019 0  Not Estab. % Final    ABS. NEUTROPHILS 05/08/2019 5.6  1.4 - 7.0 x10E3/uL Final    Abs Lymphocytes 05/08/2019 2.1  0.7 - 3.1 x10E3/uL Final    ABS. MONOCYTES 05/08/2019 0.7  0.1 - 0.9 x10E3/uL Final    ABS.  EOSINOPHILS 05/08/2019 0.1  0.0 - 0.4 x10E3/uL Final    ABS. BASOPHILS 05/08/2019 0.0  0.0 - 0.2 x10E3/uL Final    IMMATURE GRANULOCYTES 05/08/2019 0  Not Estab. % Final    ABS. IMM. GRANS. 05/08/2019 0.0  0.0 - 0.1 x10E3/uL Final    Glucose 05/08/2019 86  65 - 99 mg/dL Final    Comment: Specimen received in contact with cells. No visible hemolysis  present. However GLUC may be decreased and K increased. Clinical  correlation indicated.  BUN 05/08/2019 10  6 - 20 mg/dL Final    Creatinine 05/08/2019 0.65  0.57 - 1.00 mg/dL Final    GFR est non-AA 05/08/2019 122  >59 mL/min/1.73 Final    GFR est AA 05/08/2019 141  >59 mL/min/1.73 Final    BUN/Creatinine ratio 05/08/2019 15  9 - 23 Final    Sodium 05/08/2019 142  134 - 144 mmol/L Final    Potassium 05/08/2019 4.7  3.5 - 5.2 mmol/L Final    Comment: Specimen received in contact with cells. No visible hemolysis  present. However GLUC may be decreased and K increased. Clinical  correlation indicated.  Chloride 05/08/2019 105  96 - 106 mmol/L Final    CO2 05/08/2019 21  20 - 29 mmol/L Final    Calcium 05/08/2019 9.3  8.7 - 10.2 mg/dL Final    Protein, total 05/08/2019 6.9  6.0 - 8.5 g/dL Final    Albumin 05/08/2019 4.0  3.5 - 5.5 g/dL Final    GLOBULIN, TOTAL 05/08/2019 2.9  1.5 - 4.5 g/dL Final    A-G Ratio 05/08/2019 1.4  1.2 - 2.2 Final    Bilirubin, total 05/08/2019 <0.2  0.0 - 1.2 mg/dL Final    Alk.  phosphatase 05/08/2019 99  39 - 117 IU/L Final    AST (SGOT) 05/08/2019 12  0 - 40 IU/L Final    ALT (SGPT) 05/08/2019 9  0 - 32 IU/L Final    Cholesterol, total 05/08/2019 136  100 - 199 mg/dL Final    Triglyceride 05/08/2019 92  0 - 149 mg/dL Final    HDL Cholesterol 05/08/2019 29* >39 mg/dL Final    VLDL, calculated 05/08/2019 18  5 - 40 mg/dL Final    LDL, calculated 05/08/2019 89  0 - 99 mg/dL Final    TSH 05/08/2019 0.616  0.450 - 4.500 uIU/mL Final    hCG,Beta Subunit,Ql. 05/08/2019 Negative  Negative <6 mIU/mL Final    Iron 05/08/2019 15* 27 - 159 ug/dL Final    Ferritin 05/08/2019 8* 15 - 150 ng/mL Final       ASSESSMENT:   1. Current moderate episode of major depressive disorder without prior episode ([facility] [address]    2. Anxiety, improved  3. Chronic iron deficiency anemia from mihai s-tiffanie menak up, noncompliant with iron theraoy  4. Dyslipidemia    PLAN:  .  Orders Placed This Encounter    escitalopram oxalate (LEXAPRO) 10 mg tablet         Encourage iron therpay        Remain on buspar at least 2 weeks during lexapro initiation    Follow-up and Dispositions    · Return in about 1 month (around 7/4/2019) for new medication follow up       I have discussed the diagnosis with the patient and the intended plan as seen in the  orders above. The patient understands and agees with the plan. The patient has   received an after visit summary and questions were answered concerning  future plans  Patient labs and/or xrays were reviewed  Past records were reviewed.

## 2019-07-15 ENCOUNTER — OFFICE VISIT (OUTPATIENT)
Dept: INTERNAL MEDICINE CLINIC | Age: 29
End: 2019-07-15

## 2019-07-15 VITALS
TEMPERATURE: 98.6 F | HEIGHT: 65 IN | SYSTOLIC BLOOD PRESSURE: 112 MMHG | BODY MASS INDEX: 38.59 KG/M2 | WEIGHT: 231.6 LBS | DIASTOLIC BLOOD PRESSURE: 79 MMHG | OXYGEN SATURATION: 95 % | HEART RATE: 94 BPM

## 2019-07-15 DIAGNOSIS — D50.9 IRON DEFICIENCY ANEMIA, UNSPECIFIED IRON DEFICIENCY ANEMIA TYPE: ICD-10-CM

## 2019-07-15 DIAGNOSIS — R51.9 NONINTRACTABLE HEADACHE, UNSPECIFIED CHRONICITY PATTERN, UNSPECIFIED HEADACHE TYPE: ICD-10-CM

## 2019-07-15 DIAGNOSIS — F32.A DEPRESSION, UNSPECIFIED DEPRESSION TYPE: Primary | ICD-10-CM

## 2019-07-15 DIAGNOSIS — Z79.899 ENCOUNTER FOR LONG-TERM (CURRENT) USE OF OTHER MEDICATIONS: ICD-10-CM

## 2019-07-15 DIAGNOSIS — F41.9 ANXIETY: ICD-10-CM

## 2019-07-15 RX ORDER — ACETAMINOPHEN 500 MG
500 TABLET ORAL
Qty: 50 TAB | Refills: 2 | Status: SHIPPED | OUTPATIENT
Start: 2019-07-15 | End: 2019-09-16 | Stop reason: SDUPTHER

## 2019-07-15 NOTE — PROGRESS NOTES
SPORTS MEDICINE AND PRIMARY CARE  Morena Mathews. MD Sarah  [address] [place] [id number]     Chief Complaint   Patient presents with    Medication Refill     Patient is here for a medication follow up. SUBJECTIVE:    Eugenie Polk is a 30 y.o. female with PMH depression and anxiety. Her medication combination is working very well and she feels more confident that she can continue to manage her emotions. Therapist is helping her work on setting boundaries in relationships. Ibuprofen and lexapro have a negative drug interaction and pt needs another medication for dysmenorrhea. She has had a good response to tylenol in the past.    Current Outpatient Medications   Medication Sig Dispense Refill    acetaminophen (TYLENOL) 500 mg tablet Take 1 Tab by mouth every six (6) hours as needed for Pain. 50 Tab 2    escitalopram oxalate (LEXAPRO) 10 mg tablet Take 1 Tab by mouth daily. 30 Tab 1    busPIRone (BUSPAR) 7.5 mg tablet Take 1 Tab by mouth three (3) times daily. 90 Tab 1    ibuprofen (MOTRIN) 800 mg tablet Take 1 Tab by mouth every six (6) hours as needed for Pain. 20 Tab 2    ibuprofen (MOTRIN) 800 mg tablet Take 1 Tab by mouth every eight (8) hours as needed for Pain. 30 Tab 0    methocarbamol (ROBAXIN-750) 750 mg tablet Take 1 Tab by mouth four (4) times daily.  28 Tab 0     Past Medical History:   Diagnosis Date    Abnormal Pap smear     Anemia NEC     per pt takes iron PO    Asthma     bronchitis    Breastfeeding (infant)     Complication of anesthesia     never had    HX OTHER MEDICAL     Pregnant       No Known Allergies    OBJECTIVE:     Visit Vitals  /79   Pulse 94   Temp 98.6 °F (37 °C)   Ht 5' 5\" (1.651 m)   Wt 231 lb 9.6 oz (105.1 kg)   SpO2 95%   BMI 38.54 kg/m²     CONSTITUTIONAL: well , well nourished, appears age appropriate  EYES: anicterict  MENTAL STATUS: alert and oriented, appropriate affect     Office Visit on 05/08/2019   Component Date Value Ref Range Status    WBC 05/08/2019 8.6  3.4 - 10.8 x10E3/uL Final    RBC 05/08/2019 4.95  3.77 - 5.28 x10E6/uL Final    HGB 05/08/2019 9.4* 11.1 - 15.9 g/dL Final    HCT 05/08/2019 33.2* 34.0 - 46.6 % Final    MCV 05/08/2019 67* 79 - 97 fL Final    MCH 05/08/2019 19.0* 26.6 - 33.0 pg Final    MCHC 05/08/2019 28.3* 31.5 - 35.7 g/dL Final    RDW 05/08/2019 19.2* 12.3 - 15.4 % Final    PLATELET 37/01/6341 565  150 - 379 x10E3/uL Final    NEUTROPHILS 05/08/2019 66  Not Estab. % Final    Lymphocytes 05/08/2019 25  Not Estab. % Final    MONOCYTES 05/08/2019 8  Not Estab. % Final    EOSINOPHILS 05/08/2019 1  Not Estab. % Final    BASOPHILS 05/08/2019 0  Not Estab. % Final    ABS. NEUTROPHILS 05/08/2019 5.6  1.4 - 7.0 x10E3/uL Final    Abs Lymphocytes 05/08/2019 2.1  0.7 - 3.1 x10E3/uL Final    ABS. MONOCYTES 05/08/2019 0.7  0.1 - 0.9 x10E3/uL Final    ABS. EOSINOPHILS 05/08/2019 0.1  0.0 - 0.4 x10E3/uL Final    ABS. BASOPHILS 05/08/2019 0.0  0.0 - 0.2 x10E3/uL Final    IMMATURE GRANULOCYTES 05/08/2019 0  Not Estab. % Final    ABS. IMM. GRANS. 05/08/2019 0.0  0.0 - 0.1 x10E3/uL Final    Glucose 05/08/2019 86  65 - 99 mg/dL Final    Comment: Specimen received in contact with cells. No visible hemolysis  present. However GLUC may be decreased and K increased. Clinical  correlation indicated.  BUN 05/08/2019 10  6 - 20 mg/dL Final    Creatinine 05/08/2019 0.65  0.57 - 1.00 mg/dL Final    GFR est non-AA 05/08/2019 122  >59 mL/min/1.73 Final    GFR est AA 05/08/2019 141  >59 mL/min/1.73 Final    BUN/Creatinine ratio 05/08/2019 15  9 - 23 Final    Sodium 05/08/2019 142  134 - 144 mmol/L Final    Potassium 05/08/2019 4.7  3.5 - 5.2 mmol/L Final    Comment: Specimen received in contact with cells. No visible hemolysis  present. However GLUC may be decreased and K increased. Clinical  correlation indicated.       Chloride 05/08/2019 105  96 - 106 mmol/L Final    CO2 05/08/2019 21  20 - 29 mmol/L Final    Calcium 05/08/2019 9.3  8.7 - 10.2 mg/dL Final    Protein, total 05/08/2019 6.9  6.0 - 8.5 g/dL Final    Albumin 05/08/2019 4.0  3.5 - 5.5 g/dL Final    GLOBULIN, TOTAL 05/08/2019 2.9  1.5 - 4.5 g/dL Final    A-G Ratio 05/08/2019 1.4  1.2 - 2.2 Final    Bilirubin, total 05/08/2019 <0.2  0.0 - 1.2 mg/dL Final    Alk. phosphatase 05/08/2019 99  39 - 117 IU/L Final    AST (SGOT) 05/08/2019 12  0 - 40 IU/L Final    ALT (SGPT) 05/08/2019 9  0 - 32 IU/L Final    Cholesterol, total 05/08/2019 136  100 - 199 mg/dL Final    Triglyceride 05/08/2019 92  0 - 149 mg/dL Final    HDL Cholesterol 05/08/2019 29* >39 mg/dL Final    VLDL, calculated 05/08/2019 18  5 - 40 mg/dL Final    LDL, calculated 05/08/2019 89  0 - 99 mg/dL Final    TSH 05/08/2019 0.616  0.450 - 4.500 uIU/mL Final    hCG,Beta Subunit,Ql. 05/08/2019 Negative  Negative <6 mIU/mL Final    Iron 05/08/2019 15* 27 - 159 ug/dL Final    Ferritin 05/08/2019 8* 15 - 150 ng/mL Final       ASSESSMENT:     depression  Anxiety  dysmenorrhea            Iron deificiency anemia    I have discussed the diagnosis with the patient and the intended plan as seen in the  orders above. The patient understands and agees with the plan. The patient has   received an after visit summary and questions were answered concerning  future plans  Patient labs and/or xrays were reviewed  Past records were reviewed. PLAN:  .  Orders Placed This Encounter    acetaminophen (TYLENOL) 500 mg tablet         Daily iron encouraged    Follow-up and Dispositions    · Return in about 3 months (around 10/15/2019) for anxiety/depression.

## 2019-07-15 NOTE — PROGRESS NOTES
Chief Complaint   Patient presents with    Medication Refill     Patient is here for a medication follow up. 1. Have you been to the ER, urgent care clinic since your last visit? Hospitalized since your last visit? No    2. Have you seen or consulted any other health care providers outside of the [address] [place] [address] since your last visit? Include any pap smears or colon screening.  No

## 2019-09-16 ENCOUNTER — OFFICE VISIT (OUTPATIENT)
Dept: INTERNAL MEDICINE CLINIC | Age: 29
End: 2019-09-16

## 2019-09-16 VITALS
TEMPERATURE: 98.7 F | RESPIRATION RATE: 16 BRPM | HEART RATE: 94 BPM | HEIGHT: 65 IN | BODY MASS INDEX: 39.07 KG/M2 | DIASTOLIC BLOOD PRESSURE: 72 MMHG | WEIGHT: 234.5 LBS | OXYGEN SATURATION: 98 % | SYSTOLIC BLOOD PRESSURE: 107 MMHG

## 2019-09-16 DIAGNOSIS — R11.10 VOMITING, INTRACTABILITY OF VOMITING NOT SPECIFIED, PRESENCE OF NAUSEA NOT SPECIFIED, UNSPECIFIED VOMITING TYPE: ICD-10-CM

## 2019-09-16 DIAGNOSIS — F32.1 CURRENT MODERATE EPISODE OF MAJOR DEPRESSIVE DISORDER WITHOUT PRIOR EPISODE (HCC): ICD-10-CM

## 2019-09-16 DIAGNOSIS — R51.9 NONINTRACTABLE HEADACHE, UNSPECIFIED CHRONICITY PATTERN, UNSPECIFIED HEADACHE TYPE: Primary | ICD-10-CM

## 2019-09-16 DIAGNOSIS — D50.9 IRON DEFICIENCY ANEMIA, UNSPECIFIED IRON DEFICIENCY ANEMIA TYPE: ICD-10-CM

## 2019-09-16 DIAGNOSIS — R32 URINARY INCONTINENCE, UNSPECIFIED TYPE: ICD-10-CM

## 2019-09-16 DIAGNOSIS — F41.9 ANXIETY: ICD-10-CM

## 2019-09-16 RX ORDER — BUSPIRONE HYDROCHLORIDE 7.5 MG/1
7.5 TABLET ORAL 3 TIMES DAILY
Qty: 90 TAB | Refills: 5 | Status: SHIPPED | OUTPATIENT
Start: 2019-09-16 | End: 2020-04-09 | Stop reason: SDUPTHER

## 2019-09-16 RX ORDER — ACETAMINOPHEN 500 MG
500 TABLET ORAL
Qty: 50 TAB | Refills: 0 | Status: SHIPPED | OUTPATIENT
Start: 2019-09-16 | End: 2020-04-09 | Stop reason: SDUPTHER

## 2019-09-16 RX ORDER — ESCITALOPRAM OXALATE 10 MG/1
10 TABLET ORAL DAILY
Qty: 30 TAB | Refills: 5 | Status: SHIPPED | OUTPATIENT
Start: 2019-09-16 | End: 2020-10-07 | Stop reason: SDUPTHER

## 2019-09-16 NOTE — PROGRESS NOTES
Chief Complaint   Patient presents with    Medication Evaluation     Patient is here for a medication follow up. Elise stated that she thinks her medication need to be changed. 1. Have you been to the ER, urgent care clinic since your last visit? Hospitalized since your last visit? No    2. Have you seen or consulted any other health care providers outside of the [address] [place] [address] since your last visit? Include any pap smears or colon screening.  No

## 2019-09-16 NOTE — PROGRESS NOTES
SPORTS MEDICINE AND PRIMARY CARE  Charito Morgan. MD Sarah  [address] [address]    Chief Complaint   Patient presents with    Medication Evaluation     Patient is here for a medication follow up. Elise stated that she thinks her medication need to be changed. SUBJECTIVE:    Juanjo Mcgregor is a 30 y.o. female for depression and anxiety evaluation. She currently takes buspar and lexapro. She has somatic symptoms of depression. Headache is chronic, described as a pressure/squeezing sensation involving the back of head, can involve eyes, has used ibuprofen/tylenol/excedrin, no imaging,+n/v, triggers: menses, Sleep helps some. Waking up with dizziness. Duration 30-60 min. Last eye exam 2018, +glasses astigmatism      Feels stressed . LMP 8/21/19  Contraception: none  Sexually active: none x months     Urinary incontinence- began with 1st pregnancy, improved overtime. Kegel- not helping  No dysuria, freq,  c sect x 2. Heavy menses. No anemia. Told to comply with iron. Current Outpatient Medications   Medication Sig Dispense Refill    acetaminophen (TYLENOL) 500 mg tablet Take 1 Tab by mouth every six (6) hours as needed for Pain. 50 Tab 0    busPIRone (BUSPAR) 7.5 mg tablet Take 1 Tab by mouth three (3) times daily. Take for anxiety 90 Tab 5    escitalopram oxalate (LEXAPRO) 10 mg tablet Take 1 Tab by mouth daily. 30 Tab 5    ibuprofen (MOTRIN) 800 mg tablet Take 1 Tab by mouth every six (6) hours as needed for Pain. 20 Tab 2    ibuprofen (MOTRIN) 800 mg tablet Take 1 Tab by mouth every eight (8) hours as needed for Pain. 30 Tab 0    methocarbamol (ROBAXIN-750) 750 mg tablet Take 1 Tab by mouth four (4) times daily.  28 Tab 0     Past Medical History:   Diagnosis Date    Abnormal Pap smear     Anemia NEC     per pt takes iron PO    Asthma     bronchitis    Breastfeeding (infant)     Complication of anesthesia     never had    Depression     HX OTHER MEDICAL     Pregnant Past Surgical History:   Procedure Laterality Date    HX GYN      c section     No Known Allergies    REVIEW OF SYSTEMS:  General: negative for - chills or fever  ENT: negative for - headaches, nasal congestion or tinnitus  Respiratory: negative for - cough, hemoptysis, shortness of breath or wheezing  Cardiovascular : negative for - chest pain, edema, palpitations or shortness of breath  Gastrointestinal: negative for - abdominal pain, blood in stools, heartburn or nausea/vomiting  Genito-Urinary: no dysuria, trouble voiding, or hematuria  Musculoskeletal: negative for - gait disturbance, joint pain, joint stiffness or joint swelling  Neurological: no TIA or stroke symptoms  Hematologic: no bruises, no bleeding, no swollen glands  Integument: no lumps, mole changes, nail changes or rash  Endocrine:no malaise/lethargy or unexpected weight changes      Social History     Socioeconomic History    Marital status:      Spouse name: Not on file    Number of children: Not on file    Years of education: Not on file    Highest education level: Not on file   Tobacco Use    Smoking status: Current Every Day Smoker     Packs/day: 0.50     Years: 3.00     Pack years: 1.50    Smokeless tobacco: Never Used   Substance and Sexual Activity    Alcohol use: Not Currently    Drug use: No    Sexual activity: Yes     Partners: Male     Birth control/protection: None     Family History   Problem Relation Age of Onset    Cancer Maternal Grandfather     Diabetes Paternal Grandmother     Diabetes Father     Hypertension Father        OBJECTIVE:     Visit Vitals  /72   Pulse 94   Temp 98.7 °F (37.1 °C)   Resp 16   Ht 5' 5\" (1.651 m)   Wt 234 lb 8 oz (106.4 kg)   SpO2 98%   BMI 39.02 kg/m²     CONSTITUTIONAL: well , well nourished, appears age appropriate  EYES: perrla, eom intact  ENMT:moist mucous membranes, pharynx clear  NECK: supple.  Thyroid normal  RESPIRATORY: Chest: clear bilaterally  CARDIOVASCULAR: Heart: regular rate and rhythm  GASTROINTESTINAL: Abdomen: soft, bowel sounds active  HEMATOLOGIC: no pathological lymph nodes palpated  MUSCULOSKELETAL: Extremities: no edema, pulse 1+   INTEGUMENT: No unusual rashes or suspicious skin lesions noted. Nails appear normal.  NEUROLOGIC: non-focal exam   MENTAL STATUS: alert and oriented, appropriate affect     No visits with results within 3 Month(s) from this visit. Latest known visit with results is:   Office Visit on 05/08/2019   Component Date Value Ref Range Status    WBC 05/08/2019 8.6  3.4 - 10.8 x10E3/uL Final    RBC 05/08/2019 4.95  3.77 - 5.28 x10E6/uL Final    HGB 05/08/2019 9.4* 11.1 - 15.9 g/dL Final    HCT 05/08/2019 33.2* 34.0 - 46.6 % Final    MCV 05/08/2019 67* 79 - 97 fL Final    MCH 05/08/2019 19.0* 26.6 - 33.0 pg Final    MCHC 05/08/2019 28.3* 31.5 - 35.7 g/dL Final    RDW 05/08/2019 19.2* 12.3 - 15.4 % Final    PLATELET 31/08/0998 891  150 - 379 x10E3/uL Final    NEUTROPHILS 05/08/2019 66  Not Estab. % Final    Lymphocytes 05/08/2019 25  Not Estab. % Final    MONOCYTES 05/08/2019 8  Not Estab. % Final    EOSINOPHILS 05/08/2019 1  Not Estab. % Final    BASOPHILS 05/08/2019 0  Not Estab. % Final    ABS. NEUTROPHILS 05/08/2019 5.6  1.4 - 7.0 x10E3/uL Final    Abs Lymphocytes 05/08/2019 2.1  0.7 - 3.1 x10E3/uL Final    ABS. MONOCYTES 05/08/2019 0.7  0.1 - 0.9 x10E3/uL Final    ABS. EOSINOPHILS 05/08/2019 0.1  0.0 - 0.4 x10E3/uL Final    ABS. BASOPHILS 05/08/2019 0.0  0.0 - 0.2 x10E3/uL Final    IMMATURE GRANULOCYTES 05/08/2019 0  Not Estab. % Final    ABS. IMM. GRANS. 05/08/2019 0.0  0.0 - 0.1 x10E3/uL Final    Glucose 05/08/2019 86  65 - 99 mg/dL Final    Comment: Specimen received in contact with cells. No visible hemolysis  present. However GLUC may be decreased and K increased. Clinical  correlation indicated.       BUN 05/08/2019 10  6 - 20 mg/dL Final    Creatinine 05/08/2019 0.65  0.57 - 1.00 mg/dL Final    GFR est non-AA 05/08/2019 122  >59 mL/min/1.73 Final    GFR est AA 05/08/2019 141  >59 mL/min/1.73 Final    BUN/Creatinine ratio 05/08/2019 15  9 - 23 Final    Sodium 05/08/2019 142  134 - 144 mmol/L Final    Potassium 05/08/2019 4.7  3.5 - 5.2 mmol/L Final    Comment: Specimen received in contact with cells. No visible hemolysis  present. However GLUC may be decreased and K increased. Clinical  correlation indicated.  Chloride 05/08/2019 105  96 - 106 mmol/L Final    CO2 05/08/2019 21  20 - 29 mmol/L Final    Calcium 05/08/2019 9.3  8.7 - 10.2 mg/dL Final    Protein, total 05/08/2019 6.9  6.0 - 8.5 g/dL Final    Albumin 05/08/2019 4.0  3.5 - 5.5 g/dL Final    GLOBULIN, TOTAL 05/08/2019 2.9  1.5 - 4.5 g/dL Final    A-G Ratio 05/08/2019 1.4  1.2 - 2.2 Final    Bilirubin, total 05/08/2019 <0.2  0.0 - 1.2 mg/dL Final    Alk. phosphatase 05/08/2019 99  39 - 117 IU/L Final    AST (SGOT) 05/08/2019 12  0 - 40 IU/L Final    ALT (SGPT) 05/08/2019 9  0 - 32 IU/L Final    Cholesterol, total 05/08/2019 136  100 - 199 mg/dL Final    Triglyceride 05/08/2019 92  0 - 149 mg/dL Final    HDL Cholesterol 05/08/2019 29* >39 mg/dL Final    VLDL, calculated 05/08/2019 18  5 - 40 mg/dL Final    LDL, calculated 05/08/2019 89  0 - 99 mg/dL Final    TSH 05/08/2019 0.616  0.450 - 4.500 uIU/mL Final    hCG,Beta Subunit,Ql. 05/08/2019 Negative  Negative <6 mIU/mL Final    Iron 05/08/2019 15* 27 - 159 ug/dL Final    Ferritin 05/08/2019 8* 15 - 150 ng/mL Final       ASSESSMENT:   1. Urinary incontinence, unspecified type    2. Nonintractable headache, unspecified chronicity pattern, unspecified headache type    3. Anxiety    4. Current moderate episode of major depressive disorder without prior episode ([facility] [address]    5. Vomiting, intractability of vomiting not specified, presence of nausea not specified, unspecified vomiting type    6.  Iron deficiency anemia, unspecified iron deficiency anemia type      I have discussed the diagnosis with the patient and the intended plan as seen in the  orders. The patient understands and agees with the plan. The patient has   received an after visit summary and questions were answered concerning  future plans  Patient labs and/or xrays were reviewed  Past records were reviewed. PLAN:  .  Orders Placed This Encounter    CBC WITH AUTOMATED DIFF    PROLACTIN    METABOLIC PANEL, COMPREHENSIVE    AMYLASE    LIPASE    AMB POC URINALYSIS DIP STICK AUTO W/O MICRO    AMB POC URINE PREGNANCY TEST, VISUAL COLOR COMPARISON    acetaminophen (TYLENOL) 500 mg tablet     busPIRone (BUSPAR) 7.5 mg tablet     escitalopram oxalate (LEXAPRO) 10 mg tablet   RTO 1 mo  Counseled regarding diet, exercise and healthy lifestyle  Medication Side Effects and Warnings were discussed with patient,  Patient Labs were reviewed and or requested, and  Patient Past Records were reviewed and or requested  yes      A total of at least 25 min was spent during this evaluation of which half was spent in counseling and care coordination.

## 2019-09-16 NOTE — PATIENT INSTRUCTIONS
Anemia From Chronic Disease: Care Instructions  Your Care Instructions    Anemia is a low level of red blood cells. Red blood cells carry oxygen from your lungs to the rest of your body. Sometimes when you have a long-term (chronic) disease, such as kidney disease, arthritis, diabetes, cancer, or an infection, your body does not make enough red blood cells. Follow-up care is a key part of your treatment and safety. Be sure to make and go to all appointments, and call your doctor if you are having problems. It's also a good idea to know your test results and keep a list of the medicines you take. How can you care for yourself at home? · Follow your doctor's instructions to treat the chronic condition that is causing the anemia. · Be safe with medicines. Take your medicine to treat your chronic condition exactly as prescribed. Call your doctor if you think you are having a problem with your medicine. · Take your medicine for anemia exactly as prescribed. Call your doctor if you think you are having a problem with your medicine. Medicines to increase the number of red blood cells (such as epoetin or darbepoetin) may be given as an injection. ? If you miss a dose, take it as soon as you can, unless it is almost time for your next dose. In that case, get back on your regular schedule and take only one dose. ? Do not freeze this medicine. Store it in the refrigerator. Do not shake the bottle before you prepare the shot. · Keep all your appointments for blood tests to check on your hemoglobin levels. When should you call for help? Call 911 anytime you think you may need emergency care.  For example, call if:    · You passed out (lost consciousness).    Call your doctor now or seek immediate medical care if:    · You are short of breath.     · You are dizzy or light-headed, or you feel like you may faint.     · You have new or worse bleeding.    Watch closely for changes in your health, and be sure to contact your doctor if:    · You feel weaker or more tired than usual.     · You do not get better as expected. Where can you learn more? Go to http://kalia-everett.info/. Enter E502 in the search box to learn more about \"Anemia From Chronic Disease: Care Instructions. \"  Current as of: March 28, 2019  Content Version: 12.1  © 7610-8299 [employer]. Care instructions adapted under license by [employer] (which disclaims liability or warranty for this information). If you have questions about a medical condition or this instruction, always ask your healthcare professional. [place] [address] any warranty or liability for your use of this information. Bladder Training: Care Instructions  Your Care Instructions    Bladder training is used to treat urge incontinence and stress incontinence. Urge incontinence means that the need to urinate comes on so fast that you can't get to a toilet in time. Stress incontinence means that you leak urine because of pressure on your bladder. For example, it may happen when you laugh, cough, or lift something heavy. Bladder training can increase how long you can wait before you have to urinate. It can also help your bladder hold more urine. And it can give you better control over the urge to urinate. It is important to remember that bladder training takes a few weeks to a few months to make a difference. You may not see results right away, but don't give up. Follow-up care is a key part of your treatment and safety. Be sure to make and go to all appointments, and call your doctor if you are having problems. It's also a good idea to know your test results and keep a list of the medicines you take. How can you care for yourself at home? Work with your doctor to come up with a bladder training program that is right for you. You may use one or more of the following methods.   Delayed urination  · In the beginning, try to keep from urinating for 5 minutes after you first feel the need to go. · While you wait, take deep, slow breaths to relax. Kegel exercises can also help you delay the need to go to the bathroom. · After some practice, when you can easily wait 5 minutes to urinate, try to wait 10 minutes before you urinate. · Slowly increase the waiting period until you are able to control when you have to urinate. Scheduled urination  · Empty your bladder when you first wake up in the morning. · Schedule times throughout the day when you will urinate. · Start by going to the bathroom every hour, even if you don't need to go. · Slowly increase the time between trips to the bathroom. · When you have found a schedule that works well for you, keep doing it. · If you wake up during the night and have to urinate, do it. Apply your schedule to waking hours only. Kegel exercises  These tighten and strengthen pelvic muscles, which can help you control the flow of urine. To do Kegel exercises:  · Squeeze the same muscles you would use to stop your urine. Your belly and thighs should not move. · Hold the squeeze for 3 seconds, and then relax for 3 seconds. · Start with 3 seconds. Then add 1 second each week until you are able to squeeze for 10 seconds. · Repeat the exercise 10 to 15 times a session. Do three or more sessions a day. When should you call for help? Watch closely for changes in your health, and be sure to contact your doctor if:    · Your incontinence is getting worse.     · You do not get better as expected. Where can you learn more? Go to http://kalia-everett.info/. Enter H028 in the search box to learn more about \"Bladder Training: Care Instructions. \"  Current as of: December 19, 2018  Content Version: 12.1  © 3339-3017 [employer]. Care instructions adapted under license by [employer] (which disclaims liability or warranty for this information).  If you have questions about a medical condition or this instruction, always ask your healthcare professional. [address] any warranty or liability for your use of this information. Kegel Exercises: Care Instructions  Your Care Instructions    Kegel exercises strengthen muscles around the bladder. These muscles control the flow of urine. Kegel exercises are sometime called \"pelvic floor\" exercises. They can help prevent urine leakage and keep the pelvic organs in place. A woman who just had a baby might want to try Kegel exercises. They can strengthen pelvic muscles that have been weakened by pregnancy and childbirth. A man or woman may use Kegel exercises to treat urine leakage. You do Kegel exercises by tightening the muscles you use when you urinate. You will likely need to do these exercises for several weeks to get better. Follow-up care is a key part of your treatment and safety. Be sure to make and go to all appointments, and call your doctor if you are having problems. It's also a good idea to know your test results and keep a list of the medicines you take. How can you care for yourself at home? · Do Kegel exercises. ? Find the muscles you need to strengthen. To do this, tighten the muscles that stop your urine while you are going to the bathroom. These are the same muscles you squeeze during Kegel exercises. ? Squeeze the muscles as hard as you can. Your belly and thighs should not move. ? Hold the squeeze for 3 seconds. Then relax for 3 seconds. ? Start with 3 seconds, and then add 1 second each week until you are able to squeeze for 10 seconds. ? Repeat the exercise 10 to 15 times for each session. Do three or more sessions each day. · You can check to see if you are using the right muscles. Place a finger in your vagina and squeeze around it. You are doing them right when you feel pressure around your finger.  Your doctor may also suggest that you put special weights in your vagina while you do the exercises. · Do not smoke. It can irritate the bladder. If you need help quitting, talk to your doctor about stop-smoking programs and medicines. These can increase your chances of quitting for good. Where can you learn more? Go to http://kalia-everett.info/. Enter A432 in the search box to learn more about \"Kegel Exercises: Care Instructions. \"  Current as of: December 19, 2018  Content Version: 12.1  © 9668-8763 [employer]. Care instructions adapted under license by [employer] (which disclaims liability or warranty for this information). If you have questions about a medical condition or this instruction, always ask your healthcare professional. [address] any warranty or liability for your use of this information.

## 2019-09-17 LAB
ALBUMIN SERPL-MCNC: 4.2 G/DL (ref 3.5–5.5)
ALBUMIN/GLOB SERPL: 1.8 {RATIO} (ref 1.2–2.2)
ALP SERPL-CCNC: 95 IU/L (ref 39–117)
ALT SERPL-CCNC: 7 IU/L (ref 0–32)
AMYLASE SERPL-CCNC: 46 U/L (ref 31–124)
AST SERPL-CCNC: 12 IU/L (ref 0–40)
BASOPHILS # BLD AUTO: 0 X10E3/UL (ref 0–0.2)
BASOPHILS NFR BLD AUTO: 0 %
BILIRUB SERPL-MCNC: 0.3 MG/DL (ref 0–1.2)
BUN SERPL-MCNC: 9 MG/DL (ref 6–20)
BUN/CREAT SERPL: 15 (ref 9–23)
CALCIUM SERPL-MCNC: 8.5 MG/DL (ref 8.7–10.2)
CHLORIDE SERPL-SCNC: 105 MMOL/L (ref 96–106)
CO2 SERPL-SCNC: 24 MMOL/L (ref 20–29)
CREAT SERPL-MCNC: 0.6 MG/DL (ref 0.57–1)
EOSINOPHIL # BLD AUTO: 0.1 X10E3/UL (ref 0–0.4)
EOSINOPHIL NFR BLD AUTO: 1 %
ERYTHROCYTE [DISTWIDTH] IN BLOOD BY AUTOMATED COUNT: 17.5 % (ref 12.3–15.4)
GLOBULIN SER CALC-MCNC: 2.4 G/DL (ref 1.5–4.5)
GLUCOSE SERPL-MCNC: 91 MG/DL (ref 65–99)
HCT VFR BLD AUTO: 32.8 % (ref 34–46.6)
HGB BLD-MCNC: 9.3 G/DL (ref 11.1–15.9)
IMM GRANULOCYTES # BLD AUTO: 0 X10E3/UL (ref 0–0.1)
IMM GRANULOCYTES NFR BLD AUTO: 0 %
LIPASE SERPL-CCNC: 13 U/L (ref 14–72)
LYMPHOCYTES # BLD AUTO: 2.1 X10E3/UL (ref 0.7–3.1)
LYMPHOCYTES NFR BLD AUTO: 20 %
MCH RBC QN AUTO: 19.7 PG (ref 26.6–33)
MCHC RBC AUTO-ENTMCNC: 28.4 G/DL (ref 31.5–35.7)
MCV RBC AUTO: 69 FL (ref 79–97)
MONOCYTES # BLD AUTO: 0.8 X10E3/UL (ref 0.1–0.9)
MONOCYTES NFR BLD AUTO: 7 %
NEUTROPHILS # BLD AUTO: 7.5 X10E3/UL (ref 1.4–7)
NEUTROPHILS NFR BLD AUTO: 72 %
PLATELET # BLD AUTO: 316 X10E3/UL (ref 150–450)
POTASSIUM SERPL-SCNC: 4.1 MMOL/L (ref 3.5–5.2)
PROLACTIN SERPL-MCNC: 8.9 NG/ML (ref 4.8–23.3)
PROT SERPL-MCNC: 6.6 G/DL (ref 6–8.5)
RBC # BLD AUTO: 4.73 X10E6/UL (ref 3.77–5.28)
SODIUM SERPL-SCNC: 142 MMOL/L (ref 134–144)
WBC # BLD AUTO: 10.5 X10E3/UL (ref 3.4–10.8)

## 2019-12-20 ENCOUNTER — APPOINTMENT (OUTPATIENT)
Dept: ULTRASOUND IMAGING | Age: 29
End: 2019-12-20
Attending: EMERGENCY MEDICINE
Payer: MEDICAID

## 2019-12-20 ENCOUNTER — HOSPITAL ENCOUNTER (EMERGENCY)
Age: 29
Discharge: HOME | End: 2019-12-20
Attending: EMERGENCY MEDICINE
Payer: MEDICAID

## 2019-12-20 VITALS
BODY MASS INDEX: 39.61 KG/M2 | RESPIRATION RATE: 18 BRPM | DIASTOLIC BLOOD PRESSURE: 68 MMHG | HEART RATE: 95 BPM | WEIGHT: 238 LBS | SYSTOLIC BLOOD PRESSURE: 114 MMHG | TEMPERATURE: 97.4 F | OXYGEN SATURATION: 97 %

## 2019-12-20 DIAGNOSIS — O20.0 THREATENED ABORTION: Primary | ICD-10-CM

## 2019-12-20 LAB
ABO + RH BLD: NORMAL
ANION GAP SERPL CALC-SCNC: 8 MMOL/L (ref 5–15)
BASOPHILS # BLD: 0 K/UL (ref 0–0.1)
BASOPHILS NFR BLD: 0 % (ref 0–1)
BUN SERPL-MCNC: 10 MG/DL (ref 6–20)
BUN/CREAT SERPL: 19 (ref 12–20)
CALCIUM SERPL-MCNC: 8.6 MG/DL (ref 8.5–10.1)
CHLORIDE SERPL-SCNC: 104 MMOL/L (ref 97–108)
CLUE CELLS VAG QL WET PREP: NORMAL
CO2 SERPL-SCNC: 27 MMOL/L (ref 21–32)
CREAT SERPL-MCNC: 0.54 MG/DL (ref 0.55–1.02)
DIFFERENTIAL METHOD BLD: ABNORMAL
EOSINOPHIL # BLD: 0.1 K/UL (ref 0–0.4)
EOSINOPHIL NFR BLD: 1 % (ref 0–7)
ERYTHROCYTE [DISTWIDTH] IN BLOOD BY AUTOMATED COUNT: 18.7 % (ref 11.5–14.5)
GLUCOSE SERPL-MCNC: 89 MG/DL (ref 65–100)
HCG SERPL-ACNC: 175 MIU/ML (ref 0–6)
HCT VFR BLD AUTO: 34 % (ref 35–47)
HGB BLD-MCNC: 9.6 G/DL (ref 11.5–16)
IMM GRANULOCYTES # BLD AUTO: 0 K/UL (ref 0–0.04)
IMM GRANULOCYTES NFR BLD AUTO: 0 % (ref 0–0.5)
KOH PREP SPEC: NORMAL
LYMPHOCYTES # BLD: 1.6 K/UL (ref 0.8–3.5)
LYMPHOCYTES NFR BLD: 19 % (ref 12–49)
MCH RBC QN AUTO: 19.9 PG (ref 26–34)
MCHC RBC AUTO-ENTMCNC: 28.2 G/DL (ref 30–36.5)
MCV RBC AUTO: 70.5 FL (ref 80–99)
MONOCYTES # BLD: 0.7 K/UL (ref 0–1)
MONOCYTES NFR BLD: 8 % (ref 5–13)
NEUTS SEG # BLD: 5.8 K/UL (ref 1.8–8)
NEUTS SEG NFR BLD: 72 % (ref 32–75)
NRBC # BLD: 0 K/UL (ref 0–0.01)
NRBC BLD-RTO: 0 PER 100 WBC
PLATELET # BLD AUTO: 275 K/UL (ref 150–400)
PMV BLD AUTO: 10.8 FL (ref 8.9–12.9)
POTASSIUM SERPL-SCNC: 4 MMOL/L (ref 3.5–5.1)
RBC # BLD AUTO: 4.82 M/UL (ref 3.8–5.2)
RBC MORPH BLD: ABNORMAL
RBC MORPH BLD: ABNORMAL
SERVICE CMNT-IMP: NORMAL
SODIUM SERPL-SCNC: 139 MMOL/L (ref 136–145)
T VAGINALIS VAG QL WET PREP: NORMAL
WBC # BLD AUTO: 8.2 K/UL (ref 3.6–11)

## 2019-12-20 PROCEDURE — 84702 CHORIONIC GONADOTROPIN TEST: CPT

## 2019-12-20 PROCEDURE — 86900 BLOOD TYPING SEROLOGIC ABO: CPT

## 2019-12-20 PROCEDURE — 76801 OB US < 14 WKS SINGLE FETUS: CPT

## 2019-12-20 PROCEDURE — 85025 COMPLETE CBC W/AUTO DIFF WBC: CPT

## 2019-12-20 PROCEDURE — 87210 SMEAR WET MOUNT SALINE/INK: CPT

## 2019-12-20 PROCEDURE — 36415 COLL VENOUS BLD VENIPUNCTURE: CPT

## 2019-12-20 PROCEDURE — 99284 EMERGENCY DEPT VISIT MOD MDM: CPT

## 2019-12-20 PROCEDURE — 76817 TRANSVAGINAL US OBSTETRIC: CPT

## 2019-12-20 PROCEDURE — 80048 BASIC METABOLIC PNL TOTAL CA: CPT

## 2019-12-20 PROCEDURE — 87491 CHLMYD TRACH DNA AMP PROBE: CPT

## 2019-12-20 NOTE — ED NOTES
According to pt she is six weeks pregnant and woke up this am + bleeding. Pt is A+Ox3 clear speaking. Emergency Department Nursing Plan of Care       The Nursing Plan of Care is developed from the Nursing assessment and Emergency Department Attending provider initial evaluation. The plan of care may be reviewed in the ED Provider note.     The Plan of Care was developed with the following considerations:   Patient / Family readiness to learn indicated by:verbalized understanding  Persons(s) to be included in education: patient  Barriers to Learning/Limitations:No    Signed     Alexi Mcrae RN    12/20/2019   8:13 AM

## 2019-12-20 NOTE — DISCHARGE INSTRUCTIONS
Patient Education     Miscarriage: After Your Visit to the Emergency Room  Your Care Instructions  You were seen in the emergency room because you had or are having a miscarriage. The loss of a pregnancy can be very hard. You may wonder why it happened or blame yourself. Miscarriages are common and are not caused by exercise, stress, or sex. Most happen because the fertilized egg in the uterus does not develop normally. There is no treatment that can stop a miscarriage. As long as you do not have heavy blood loss, fever, weakness, or other signs of infection, you can let a miscarriage follow its own course. This can take several days. Your body will recover over the next several weeks. Having a miscarriage does not mean you cannot have a normal pregnancy in the future. Even though you have been released from the emergency room, you still need to watch for any problems. The doctor carefully checked you. But sometimes problems can develop later. If you have new symptoms, or if your symptoms do not get better, return to the emergency room or call your doctor right away. A visit to the emergency room is only one step in your treatment. Even if you feel better, you still need to do what your doctor recommends, such as going to all suggested follow-up appointments and taking medicines exactly as directed. This will help you recover and help prevent future problems. How can you care for yourself at home? · You will probably have vaginal bleeding for 1 to 2 weeks. It may be similar to, or slightly heavier than, a normal period. ¨ Use pads instead of tampons. You may use tampons during your next period, which should start in 3 to 6 weeks. ¨ You may return to your normal activities if you feel well enough to do so--but do not have sex or do heavy exercise until the bleeding stops. · Take acetaminophen (Tylenol) for cramps. Read and follow all instructions on the label.  You may have cramps for several days after the miscarriage. · Do not take two or more pain medicines at the same time unless the doctor told you to. Many pain medicines have acetaminophen, which is Tylenol. Too much acetaminophen (Tylenol) can be harmful. · You may be low in iron because of blood loss. Eat a balanced diet that is high in iron and vitamin C. Foods rich in iron include red meat, shellfish, eggs, beans, and leafy green vegetables. Foods high in vitamin C include citrus fruits, tomatoes, and broccoli. Talk to your doctor or other health care professional about whether you need to take iron pills or a multivitamin. · Talk with your doctor about any future pregnancy plans. Most doctors suggest that you wait until you have had at least one normal period before you try to get pregnant. If you do not want to get pregnant, ask your doctor about birth control options. · It may help to talk with family, friends, or a counselor if you are having trouble dealing with the loss of your pregnancy. If you feel sad or depressed for longer than 2 weeks, talk to a counselor or your doctor. When should you call for help? Call 911 if:  · You passed out (lost consciousness). Return to the emergency room now if:  · You have severe pain in your belly or pelvis. · You have severe vaginal bleeding. You are passing blood clots and soaking through a pad each hour for 2 or more hours. · You are dizzy or lightheaded, or you feel like you may faint. · You have a fever. · You have new or increased pain in your belly or pelvis. Call your doctor today if:  · You pass tissue, not just blood. · You have vaginal discharge that smells bad. · You have bleeding that lasts more than 1 week. · You feel depressed or are not able to function. Where can you learn more? Go to Achieved.co.be  Enter F728 in the search box to learn more about \"Miscarriage: After Your Visit to the Emergency Room. \"   © 0991-8941 Healthwise, Incorporated.  Care instructions adapted under license by [facility] (which disclaims liability or warranty for this information). This care instruction is for use with your licensed healthcare professional. If you have questions about a medical condition or this instruction, always ask your healthcare professional. [address] any warranty or liability for your use of this information. Content Version: 9.4.62426;  Last Revised: December 9, 2010

## 2019-12-20 NOTE — ED TRIAGE NOTES
Patient presents to ED with c/o vaginal bleeding since this morning. Patient states that she is currently 6 weeks pregnant.

## 2019-12-20 NOTE — ED PROVIDER NOTES
EMERGENCY DEPARTMENT HISTORY AND PHYSICAL EXAM      Date: 12/20/2019  Patient Name: Oc Lou    History of Presenting Illness     Chief Complaint   Patient presents with    Pregnancy Problem       History Provided By: Patient    HPI: Oc Lou, 30 y.o. female with PMHx as noted below, proximately 6 weeks pregnant presents the emergency department with chief complaint of mild abdominal cramping and vaginal bleeding. Patient notes she woke up this morning and noted onset of vaginal bleeding. She is unable to quantify the amount of blood but states it looks similar to her usual menstrual cycle. Patient also noted some mild lower abdominal cramping that is been intermittent. Patient notes that she had seen her OB for the first time this past week and has not had prenatal ultrasound at this time. Otherwise denies any dizziness, lightheadedness, passing of clots. PCP: Yassine Sanford MD    Current Outpatient Medications   Medication Sig Dispense Refill    acetaminophen (TYLENOL) 500 mg tablet Take 1 Tab by mouth every six (6) hours as needed for Pain. 50 Tab 0    busPIRone (BUSPAR) 7.5 mg tablet Take 1 Tab by mouth three (3) times daily. Take for anxiety 90 Tab 5    escitalopram oxalate (LEXAPRO) 10 mg tablet Take 1 Tab by mouth daily. 30 Tab 5    ibuprofen (MOTRIN) 800 mg tablet Take 1 Tab by mouth every six (6) hours as needed for Pain. 20 Tab 2    ibuprofen (MOTRIN) 800 mg tablet Take 1 Tab by mouth every eight (8) hours as needed for Pain. 30 Tab 0    methocarbamol (ROBAXIN-750) 750 mg tablet Take 1 Tab by mouth four (4) times daily.  28 Tab 0       Past History     Past Medical History:  Past Medical History:   Diagnosis Date    Abnormal Pap smear     Anemia NEC     per pt takes iron PO    Asthma     bronchitis    Breastfeeding (infant)     Complication of anesthesia     never had    Depression     HX OTHER MEDICAL     Pregnant       Past Surgical History:  Past Surgical History:   Procedure Laterality Date    HX GYN      c section       Family History:  Family History   Problem Relation Age of Onset    Cancer Maternal Grandfather     Diabetes Paternal Grandmother    [facility] Diabetes Father     Hypertension Father        Social History:  Social History     Tobacco Use    Smoking status: Current Every Day Smoker     Packs/day: 0.50     Years: 3.00     Pack years: 1.50    Smokeless tobacco: Never Used   Substance Use Topics    Alcohol use: Not Currently    Drug use: No       Allergies:  No Known Allergies      Review of Systems   Review of Systems  Constitutional: Negative for fever, chills, and fatigue. HENT: Negative for congestion, sore throat, rhinorrhea, sneezing and neck stiffness   Eyes: Negative for discharge and redness. Respiratory: Negative for  shortness of breath, wheezing   Cardiovascular: Negative for chest pain, palpitations   Gastrointestinal: Negative for nausea, vomiting, constipation, diarrhea and blood in stool. Genitourinary: Negative for dysuria, hematuria, flank pain, decreased urine volume, discharge,   Musculoskeletal: Negative for myalgias or joint pain . Skin: Negative for rash or lesions . Neurological: Negative weakness, light-headedness, numbness and headaches. Physical Exam   Physical Exam    GENERAL: alert and oriented, no acute distress  EYES: PEERL, No injection, discharge or icterus. ENT: Mucous membranes pink and moist.  NECK: Supple  LUNGS: Airway patent. Non-labored respirations. Breath sounds clear with good air entry bilaterally. HEART: Regular rate and rhythm. No peripheral edema  ABDOMEN: Non-distended and non-tender, without guarding or rebound.  EXAM:  External genitalia normal.  Pelvic exam: cervix: Partially open with blood in the office, ovaries and uterus normal size and non-tender to palpation, no cervical motion tenderness or adnexal masses. No discharge. . Chaperone present .   SKIN:  warm, dry  MSK/EXTREMITIES: Without swelling, tenderness or deformity, symmetric with normal ROM  NEUROLOGICAL: Alert, oriented      Diagnostic Study Results     Labs -     Recent Results (from the past 12 hour(s))   CBC WITH AUTOMATED DIFF    Collection Time: 12/20/19  8:10 AM   Result Value Ref Range    WBC 8.2 3.6 - 11.0 K/uL    RBC 4.82 3.80 - 5.20 M/uL    HGB 9.6 (L) 11.5 - 16.0 g/dL    HCT 34.0 (L) 35.0 - 47.0 %    MCV 70.5 (L) 80.0 - 99.0 FL    MCH 19.9 (L) 26.0 - 34.0 PG    MCHC 28.2 (L) 30.0 - 36.5 g/dL    RDW 18.7 (H) 11.5 - 14.5 %    PLATELET 287 812 - 846 K/uL    MPV 10.8 8.9 - 12.9 FL    NRBC 0.0 0  WBC    ABSOLUTE NRBC 0.00 0.00 - 0.01 K/uL    NEUTROPHILS 72 32 - 75 %    LYMPHOCYTES 19 12 - 49 %    MONOCYTES 8 5 - 13 %    EOSINOPHILS 1 0 - 7 %    BASOPHILS 0 0 - 1 %    IMMATURE GRANULOCYTES 0 0.0 - 0.5 %    ABS. NEUTROPHILS 5.8 1.8 - 8.0 K/UL    ABS. LYMPHOCYTES 1.6 0.8 - 3.5 K/UL    ABS. MONOCYTES 0.7 0.0 - 1.0 K/UL    ABS. EOSINOPHILS 0.1 0.0 - 0.4 K/UL    ABS. BASOPHILS 0.0 0.0 - 0.1 K/UL    ABS. IMM.  GRANS. 0.0 0.00 - 0.04 K/UL    DF SMEAR SCANNED      RBC COMMENTS ANISOCYTOSIS  1+        RBC COMMENTS HYPOCHROMIA  3+       METABOLIC PANEL, BASIC    Collection Time: 12/20/19  8:10 AM   Result Value Ref Range    Sodium 139 136 - 145 mmol/L    Potassium 4.0 3.5 - 5.1 mmol/L    Chloride 104 97 - 108 mmol/L    CO2 27 21 - 32 mmol/L    Anion gap 8 5 - 15 mmol/L    Glucose 89 65 - 100 mg/dL    BUN 10 6 - 20 MG/DL    Creatinine 0.54 (L) 0.55 - 1.02 MG/DL    BUN/Creatinine ratio 19 12 - 20      GFR est AA >60 >60 ml/min/1.73m2    GFR est non-AA >60 >60 ml/min/1.73m2    Calcium 8.6 8.5 - 10.1 MG/DL   BLOOD TYPE, (ABO+RH)    Collection Time: 12/20/19  8:10 AM   Result Value Ref Range    ABO/Rh(D) O POSITIVE    BETA HCG, QT    Collection Time: 12/20/19  8:10 AM   Result Value Ref Range    Beta HCG,  (H) 0 - 6 MIU/ML   LEONELA, OTHER SOURCES    Collection Time: 12/20/19 10:43 AM   Result Value Ref Range    Special Requests: NO SPECIAL REQUESTS      KOH NO YEAST SEEN     WET PREP    Collection Time: 19 10:43 AM   Result Value Ref Range    Clue cells CLUE CELLS ABSENT      Wet prep NO TRICHOMONAS SEEN         Radiologic Studies -   US UTS TRANSVAGINAL OB   Final Result   IMPRESSION: Small gestational sac in the lower uterine segment. At least   partially open cervix. US PREG UTS < 14 WKS SNGL   Final Result   IMPRESSION: Small gestational sac in the lower uterine segment. At least   partially open cervix. CT Results  (Last 48 hours)    None        CXR Results  (Last 48 hours)    None            Medical Decision Making   I am the first provider for this patient. I reviewed the vital signs, available nursing notes, past medical history, past surgical history, family history and social history. Vital Signs-Reviewed the patient's vital signs. Patient Vitals for the past 12 hrs:   Temp Pulse Resp BP SpO2   19 1007 -- -- -- 114/68 97 %   19 1000 -- -- -- 97/68 98 %   19 0754 97.4 °F (36.3 °C) 95 18 120/58 98 %         Records Reviewed: Nursing Notes and Old Medical Records    Provider Notes (Medical Decision Making): The patient presents to the ER with a chief complaint of vaginal bleeding in the setting of an early pregnancy without a previously documented IUP. On presentation, the patient is well appearing, in no acute distress with normal vital signs. Differential diagnosis considered includes ectopic pregnancy, threatened or incomplete , placenta previa, subchorionic hemorrhage, molar pregnancy, chorionic cyst, vaginitis, cervicitis, pregnancy of unknown location, or idiopathic bleeding during pregnancy. All labs and diagnostic studies were reviewed as reported above. Clinical history, examination and work-up suggests threatened/inevitable AB.        Examination today does not demonstrate significant life threatening hemorrhage. Vital signs were unremarkable.   Rh status was reviewed and positive. Rhogam was NOT indicated. There was no ectopic pregnancy or surgical concerns. There was no evidence of significant infection to be treated. The patient was in stable condition and planned for outpatient management. Patient was given bleeding precautions including instructions to return for soaked pads/tampons greater than every 1 hour, sudden increase in bleeding, severe pain, lightheadedness, fainting, or any other concerning symptoms. Strict pelvic rest was recommended until otherwise instructed by her obstetrician. ED Course:   Initial assessment performed. The patients presenting problems have been discussed, and they are in agreement with the care plan formulated and outlined with them. I have encouraged them to ask questions as they arise throughout their visit. Procedure Note - Pelvic Exam:    8:39 AM  Performed by:Deni Mcghee MD  Chaperoned by nurse  Pelvic exam was performed using bimanual and speculum. Further findings noted in physical exam.   The procedure took 1-15 minutes, and pt tolerated well. PROGRESS NOTE:  The patient has been re-evaluated and is ready for discharge. Reviewed available results with patient and have counseled them on diagnosis and care plan. They have expressed understanding, and all their questions have been answered. They agree with plan and agree to have pt F/U as recommended, or return to the ED if their sxs worsen. Discharge instructions have been provided and explained to them, along with reasons to have pt return to the ED. The patient is amenable to discharge so will discharge pt at this time      Disposition:  home    PLAN:  1. Discharge Medication List as of 12/20/2019 10:50 AM        2.    Follow-up Information     Follow up With Specialties Details Why Contact Info    Graham Puckett MD [facility] Schedule an appointment as soon as possible for a visit in 2 days  [facility]  [address] [facility] [address] [facility] [place]  Schedule an appointment as soon as possible for a visit in 2 days  [address]  [address]  [address] [facility]  [phone]    Follow-up with your OB/GYN on  [address] [place] [address] DEPT Emergency Medicine  If symptoms worsen [place]  [phone]        Return to ED if worse     Diagnosis     Clinical Impression:   1. Threatened         Please note that this dictation was completed with Dragon, computer voice recognition software. Quite often unanticipated grammatical, syntax, homophones, and other interpretive errors are inadvertently transcribed by the computer software. Please disregard these errors. Additionally, please excuse any errors that have escaped final proofreading.

## 2020-04-09 DIAGNOSIS — R51.9 NONINTRACTABLE HEADACHE, UNSPECIFIED CHRONICITY PATTERN, UNSPECIFIED HEADACHE TYPE: ICD-10-CM

## 2020-04-09 DIAGNOSIS — F41.9 ANXIETY: ICD-10-CM

## 2020-04-10 ENCOUNTER — NURSE TRIAGE (OUTPATIENT)
Dept: OTHER | Facility: CLINIC | Age: 30
End: 2020-04-10

## 2020-04-10 RX ORDER — BUSPIRONE HYDROCHLORIDE 7.5 MG/1
7.5 TABLET ORAL 3 TIMES DAILY
Qty: 90 TAB | Refills: 5 | Status: SHIPPED | OUTPATIENT
Start: 2020-04-10 | End: 2020-10-07 | Stop reason: SDUPTHER

## 2020-04-10 RX ORDER — ACETAMINOPHEN 500 MG
500 TABLET ORAL
Qty: 50 TAB | Refills: 0 | Status: SHIPPED | OUTPATIENT
Start: 2020-04-10 | End: 2020-06-13 | Stop reason: SDUPTHER

## 2020-04-17 ENCOUNTER — VIRTUAL VISIT (OUTPATIENT)
Dept: INTERNAL MEDICINE CLINIC | Age: 30
End: 2020-04-17

## 2020-04-17 DIAGNOSIS — F41.9 ANXIETY: Primary | ICD-10-CM

## 2020-04-17 DIAGNOSIS — M94.0 COSTOCHONDRITIS: ICD-10-CM

## 2020-04-17 DIAGNOSIS — M62.838 MUSCLE SPASM: ICD-10-CM

## 2020-04-17 RX ORDER — DIAZEPAM 5 MG/1
5 TABLET ORAL
Qty: 15 TAB | Refills: 0 | Status: SHIPPED | OUTPATIENT
Start: 2020-04-17 | End: 2020-05-31 | Stop reason: SDUPTHER

## 2020-04-20 PROBLEM — M62.838 MUSCLE SPASM: Status: ACTIVE | Noted: 2020-04-20

## 2020-04-20 PROBLEM — F41.9 ANXIETY: Status: ACTIVE | Noted: 2020-04-20

## 2020-04-20 NOTE — PROGRESS NOTES
Consent: Megan Levi, who was seen by synchronous (real-time) audio-video technology, and/or her healthcare decision maker, is aware that this patient-initiated, Telehealth encounter on 4/17/2020 is a billable service, with coverage as determined by her insurance carrier. She is aware that she may receive a bill and has provided verbal consent to proceed: Yes. Assessment & Plan:   Diagnoses and all orders for this visit:    1. Chronic Anxiety, acute exacerbation  -     diazePAM (VALIUM) 5 mg tablet; Take 1 Tab by mouth every eight (8) hours as needed for Anxiety. Max Daily Amount: 15 mg. DO NOT BREAST FEED WHILE TAKING THIS MEDICATION  -     REFERRAL TO BEHAVIORAL HEALTH    2. Costochondritis and Muscle spasm of chest wall  -     diazePAM (VALIUM) 5 mg tablet; Take 1 Tab by mouth every eight (8) hours as needed for Anxiety. Max Daily Amount: 15 mg. DO NOT BREAST FEED WHILE TAKING THIS MEDICATION  Continue nsaid  Apply heat to chest wall bid  Employ relaxation techniques    I spent at least 15 minutes with this established patient, and >50% of the time was spent counseling and/or coordinating care regarding chest pain and anxiety  712  Subjective: Megan Levi is a 30 y.o. female who was seen for Hospital Follow Up (having chest pains on the right side, was told that it was inflammation )    [facility]. Diagnosed with costochondritis. Work up is not known but ECG and blood work were unremarkable. NSAID prescription is not helpful. Pain onset has been a few weeks. Pain involves the right anterior chest and is aggravated by movement. Chest is sore to touch in certain areas. +Chest wall spasms. Pt does not endorse trauma, dyspnea, cough, wheezing, fever, chills, leg pain or swelling, acid reflux, indigestion, palpitations. Energy level is usual. NSAID prescription is not working well. There is buspar and lexapro compliance.      Anxiety is higher than usual during covid crisis and muscles feel tight. Pt is juggling work and family stress. Prior to Admission medications    Medication Sig Start Date End Date Taking? Authorizing Provider     4/17/20  Yes Michael Triana MD     4/10/20  Yes Michael Triana MD   busPIRone (BUSPAR) 7.5 mg tablet Take 1 Tab by mouth three (3) times daily. Take for anxiety 4/10/20  Yes Michael Triana MD   escitalopram oxalate (LEXAPRO) 10 mg tablet Take 1 Tab by mouth daily. 9/16/19  Yes Michael Triana MD     No Known Allergies    Patient Active Problem List   Diagnosis Code    Abdominal pain, epigastric R10.13    False labor O47.9    Severe obesity ([facility] [address] E66.01    Excess, menstruation N92.0    Anxiety F41.9    Muscle spasm M62.838     Current Outpatient Medications   Medication Sig Dispense Refill    diazePAM (VALIUM) 5 mg tablet Take 1 Tab by mouth every eight (8) hours as needed for Anxiety. Max Daily Amount: 15 mg. DO NOT BREAST FEED WHILE TAKING THIS MEDICATION 15 Tab 0    acetaminophen (TYLENOL) 500 mg tablet Take 1 Tab by mouth every six (6) hours as needed for Pain. 50 Tab 0    busPIRone (BUSPAR) 7.5 mg tablet Take 1 Tab by mouth three (3) times daily. Take for anxiety 90 Tab 5    escitalopram oxalate (LEXAPRO) 10 mg tablet Take 1 Tab by mouth daily.  30 Tab 5     No Known Allergies  Past Medical History:   Diagnosis Date    Abnormal Pap smear     Anemia NEC     per pt takes iron PO    Asthma     bronchitis    Breastfeeding (infant)     Complication of anesthesia     never had    Depression     HX OTHER MEDICAL     Pregnant     Past Surgical History:   Procedure Laterality Date    HX GYN      c section     Family History   Problem Relation Age of Onset    Cancer Maternal Grandfather     Diabetes Paternal Grandmother     Diabetes Father     Hypertension Father      Social History     Tobacco Use    Smoking status: Current Every Day Smoker     Packs/day: 0.50     Years: 3.00     Pack years: 1.50    Smokeless tobacco: Never Used   Substance Use Topics    Alcohol use: Not Currently       Review of Systems   Constitutional: Negative for chills, diaphoresis, fever and malaise/fatigue. Respiratory: Negative for cough, hemoptysis, sputum production, shortness of breath and wheezing. Cardiovascular: Positive for chest pain. Negative for palpitations and leg swelling. Gastrointestinal: Negative for abdominal pain, heartburn, nausea and vomiting. Musculoskeletal: Positive for myalgias. Neurological: Negative for weakness. Psychiatric/Behavioral: Positive for depression. Negative for substance abuse and suicidal ideas. The patient is nervous/anxious. Objective:   Vital Signs: (As obtained by patient/caregiver at home)  There were no vitals taken for this visit.      [INSTRUCTIONS:  \"[x]\" Indicates a positive item  \"[]\" Indicates a negative item  -- DELETE ALL ITEMS NOT EXAMINED]    Constitutional: [x] Appears well-developed and well-nourished [x] No apparent distress      [] Abnormal -     Mental status: [x] Alert and awake  [x] Oriented to person/place/time [x] Able to follow commands    [] Abnormal -     Eyes:   EOM    [x]  Normal    [] Abnormal -   Sclera  [x]  Normal    [] Abnormal -          Discharge [x]  None visible   [] Abnormal -     HENT: [x] Normocephalic, atraumatic  [] Abnormal -   [x] Mouth/Throat: Mucous membranes are moist    External Ears [x] Normal  [] Abnormal -    Neck: [x] No visualized mass [] Abnormal -     Pulmonary/Chest: [x] Respiratory effort normal   [x] No visualized signs of difficulty breathing or respiratory distress        [] Abnormal -      Musculoskeletal:   [] Normal gait with no signs of ataxia         [x] Normal range of motion of neck        [] Abnormal -     Neurological:        [x] No Facial Asymmetry (Cranial nerve 7 motor function) (limited exam due to video visit)          [x] No gaze palsy        [] Abnormal -          Skin:        [x] No significant exanthematous lesions or discoloration noted on facial skin         [] Abnormal -            Psychiatric:       [x] Normal Affect [] Abnormal -        [x] No Hallucinations    Other pertinent observable physical exam findings:-        We discussed the expected course, resolution and complications of the diagnosis(es) in detail. Medication risks, benefits, costs, interactions, and alternatives were discussed as indicated. I advised her to contact the office if her condition worsens, changes or fails to improve as anticipated. She expressed understanding with the diagnosis(es) and plan. Lele Perez is a 30 y.o. female being evaluated by a video visit encounter for concerns as above. A caregiver was present when appropriate. Due to this being a TeleHealth encounter (During [facility]- public [facility] emergency), evaluation of the following organ systems was limited: Vitals/Constitutional/EENT/Resp/CV/GI//MS/Neuro/Skin/Heme-Lymph-Imm. Pursuant to the emergency declaration under the [facility] waiver authority and the [employer] Act, this Virtual  Visit was conducted, with patient's (and/or legal guardian's) consent, to reduce the patient's risk of exposure to COVID-19 and provide necessary medical care. Services were provided through a video synchronous discussion virtually to substitute for in-person clinic visit. Patient and provider were located at their individual homes.         Dirk Carr MD

## 2020-04-21 ENCOUNTER — VIRTUAL VISIT (OUTPATIENT)
Dept: INTERNAL MEDICINE CLINIC | Age: 30
End: 2020-04-21

## 2020-04-21 DIAGNOSIS — F41.9 ANXIETY: Primary | ICD-10-CM

## 2020-04-21 DIAGNOSIS — F33.0 MILD EPISODE OF RECURRENT MAJOR DEPRESSIVE DISORDER (HCC): ICD-10-CM
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
## 2020-04-28 ENCOUNTER — VIRTUAL VISIT (OUTPATIENT)
Dept: INTERNAL MEDICINE CLINIC | Age: 30
End: 2020-04-28

## 2020-04-28 DIAGNOSIS — F33.0 MILD EPISODE OF RECURRENT MAJOR DEPRESSIVE DISORDER (HCC): Primary | ICD-10-CM

## 2020-04-28 DIAGNOSIS — F41.9 ANXIETY: ICD-10-CM
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
## 2020-05-05 ENCOUNTER — VIRTUAL VISIT (OUTPATIENT)
Dept: INTERNAL MEDICINE CLINIC | Age: 30
End: 2020-05-05

## 2020-05-05 DIAGNOSIS — F41.9 ANXIETY: ICD-10-CM

## 2020-05-05 DIAGNOSIS — F33.0 MILD EPISODE OF RECURRENT MAJOR DEPRESSIVE DISORDER (HCC): Primary | ICD-10-CM
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
## 2020-05-18 ENCOUNTER — VIRTUAL VISIT (OUTPATIENT)
Dept: INTERNAL MEDICINE CLINIC | Age: 30
End: 2020-05-18

## 2020-05-18 DIAGNOSIS — Z79.899 ENCOUNTER FOR LONG-TERM (CURRENT) USE OF OTHER MEDICATIONS: ICD-10-CM

## 2020-05-18 DIAGNOSIS — F32.A DEPRESSION, UNSPECIFIED DEPRESSION TYPE: ICD-10-CM

## 2020-05-18 DIAGNOSIS — D50.9 IRON DEFICIENCY ANEMIA, UNSPECIFIED IRON DEFICIENCY ANEMIA TYPE: ICD-10-CM

## 2020-05-18 DIAGNOSIS — F41.9 ANXIETY: Primary | ICD-10-CM

## 2020-05-18 DIAGNOSIS — M62.838 MUSCLE SPASM: ICD-10-CM

## 2020-05-18 NOTE — PROGRESS NOTES
Megan Levi is a 30 y.o. female who was seen by synchronous (real-time) audio-video technology on 5/18/2020. Consent: Megan Levi, who was seen by synchronous (real-time) audio-video technology, and/or her healthcare decision maker, is aware that this patient-initiated, Telehealth encounter on 5/18/2020 is a billable service, with coverage as determined by her insurance carrier. She is aware that she may receive a bill and has provided verbal consent to proceed: Yes. Assessment & Plan:   Diagnoses and all orders for this visit:    1. Anxiety-proved with medication and stress reduction techniques. Recommend doubling Lexapro to 20 mg daily followed by doubling BuSpar to 15 mg per dose. 2. Muscle spasm-resolved. No further intervention required    3. Depression, unspecified depression type-improved with Lexapro. Will increase dose to 20 mg a day. Continue behavioral health counseling. 4.  Iron deficiency anemia. Last evaluated December 2019. Will mail patient lab slip. 4. Iron deficiency anemia, unspecified iron deficiency anemia type              Subjective: Megan Levi is a 30 y.o. female who was seen for Anxiety (follow up )    Established patient is being seen for follow-up of anxiety. At last virtual visit she did an ED follow-up after a panic attack that involved chest pain. I recommended Valium which could act to reduce spasm and help anxiety. Patient had a positive response. She was also referred to behavioral health. She has a therapist named Sigrid Narayanan. The relationship is working out well. She has had 2 sessions and plans to make an appointment for follow-up. Sigrid Narayanan suggested that she increase her medication doses. Patient currently takes BuSpar 7.5 mg 3 times daily and Lexapro 10 mg daily. Her typical anxiety related episodes involve exposure to her stress followed by chest tightness, anxious feelings vomiting and diarrhea.   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 This is her first day out of work and she feels like a huge weight has been lifted. Patient's focus is getting her house in order and helping her children with home schooling. Prior to Admission medications    Medication Sig Start Date End Date Taking? Authorizing Provider   diazePAM (VALIUM) 5 mg tablet Take 1 Tab by mouth every eight (8) hours as needed for Anxiety. Max Daily Amount: 15 mg. DO NOT BREAST FEED WHILE TAKING THIS MEDICATION 4/17/20  Yes Linette Irizarry MD   acetaminophen (TYLENOL) 500 mg tablet Take 1 Tab by mouth every six (6) hours as needed for Pain. 4/10/20  Yes Linette Irizarry MD   busPIRone (BUSPAR) 7.5 mg tablet Take 1 Tab by mouth three (3) times daily. Take for anxiety 4/10/20  Yes Linette Irizarry MD   escitalopram oxalate (LEXAPRO) 10 mg tablet Take 1 Tab by mouth daily. 9/16/19  Yes Linette Irizarry MD     No Known Allergies    Patient Active Problem List   Diagnosis Code    Abdominal pain, epigastric R10.13    False labor O47.9    Severe obesity ([facility] E66.01    Excess, menstruation N92.0    Anxiety F41.9    Muscle spasm M62.838    Mild episode of recurrent major depressive disorder ([address] F33.0    Depression F32.9    Iron deficiency anemia D50.9     Patient Active Problem List    Diagnosis Date Noted    Depression 05/18/2020    Iron deficiency anemia 05/18/2020    Mild episode of recurrent major depressive disorder ([address] 04/21/2020    Anxiety 04/20/2020    Muscle spasm 04/20/2020    Severe obesity ([facility] 05/08/2019    Excess, menstruation 05/08/2019    False labor 10/23/2012    Abdominal pain, epigastric 11/24/2011     Current Outpatient Medications   Medication Sig Dispense Refill    diazePAM (VALIUM) 5 mg tablet Take 1 Tab by mouth every eight (8) hours as needed for Anxiety.  Max Daily Amount: 15 mg. DO NOT BREAST FEED WHILE TAKING THIS MEDICATION 15 Tab 0    acetaminophen (TYLENOL) 500 mg tablet Take 1 Tab by mouth every six (6) hours as needed for Pain. 50 Tab 0    busPIRone (BUSPAR) 7.5 mg tablet Take 1 Tab by mouth three (3) times daily. Take for anxiety 90 Tab 5    escitalopram oxalate (LEXAPRO) 10 mg tablet Take 1 Tab by mouth daily. 30 Tab 5     No Known Allergies  Past Medical History:   Diagnosis Date    Abnormal Pap smear     Anemia NEC     per pt takes iron PO    Asthma     bronchitis    Breastfeeding (infant)     Complication of anesthesia     never had    Depression     HX OTHER MEDICAL     Pregnant    Mild episode of recurrent major depressive disorder ([facility] 4/21/2020     Past Surgical History:   Procedure Laterality Date    HX GYN      c section     Family History   Problem Relation Age of Onset    Cancer Maternal Grandfather     Diabetes Paternal Grandmother     Diabetes Father     Hypertension Father      Social History     Tobacco Use    Smoking status: Current Every Day Smoker     Packs/day: 0.50     Years: 3.00     Pack years: 1.50    Smokeless tobacco: Never Used   Substance Use Topics    Alcohol use: Not Currently       Review of Systems   Cardiovascular: Positive for chest pain. Gastrointestinal: Positive for diarrhea. Psychiatric/Behavioral: Positive for depression. Negative for suicidal ideas. The patient is nervous/anxious. All other systems reviewed and are negative. Objective:   Vital Signs: (As obtained by patient/caregiver at home)  There were no vitals taken for this visit.      [INSTRUCTIONS:  \"[x]\" Indicates a positive item  \"[]\" Indicates a negative item  -- DELETE ALL ITEMS NOT EXAMINED]    Constitutional: [x] Appears well-developed and well-nourished [x] No apparent distress      [] Abnormal -     Mental status: [x] Alert and awake  [x] Oriented to person/place/time [x] Able to follow commands    [] Abnormal -     Eyes:   EOM    [x]  Normal    [] Abnormal -   Sclera  [x]  Normal    [] Abnormal -          Discharge [x]  None visible   [] Abnormal -     HENT: [x] Normocephalic, atraumatic  [] Abnormal -   [x] Mouth/Throat: Mucous membranes are moist    External Ears [x] Normal  [] Abnormal -    Neck: [x] No visualized mass [] Abnormal -     Pulmonary/Chest: [x] Respiratory effort normal   [x] No visualized signs of difficulty breathing or respiratory distress        [] Abnormal -      Musculoskeletal:   [] Normal gait with no signs of ataxia         [x] Normal range of motion of neck        [] Abnormal -     Neurological:        [x] No Facial Asymmetry (Cranial nerve 7 motor function) (limited exam due to video visit)          [x] No gaze palsy        [] Abnormal -          Skin:        [x] No significant exanthematous lesions or discoloration noted on facial skin         [] Abnormal -            Psychiatric:       [x] Normal Affect [] Abnormal -        [x] No Hallucinations    Other pertinent observable physical exam findings:-        We discussed the expected course, resolution and complications of the diagnosis(es) in detail. Medication risks, benefits, costs, interactions, and alternatives were discussed as indicated. I advised her to contact the office if her condition worsens, changes or fails to improve as anticipated. She expressed understanding with the diagnosis(es) and plan. Lele Perez is a 30 y.o. female who was evaluated by a video visit encounter for concerns as above. Patient identification was verified prior to start of the visit. A caregiver was present when appropriate. Due to this being a TeleHealth encounter (During RUSTL-03 public health emergency), evaluation of the following organ systems was limited: Vitals/Constitutional/EENT/Resp/CV/GI//MS/Neuro/Skin/Heme-Lymph-Imm.   Pursuant to the emergency declaration under the [facility] waiver authority and the [employer] Act, this Virtual  Visit was conducted, with patient's (and/or legal guardian's) consent, to reduce the patient's risk of exposure to COVID-19 and provide necessary medical care. Services were provided through a video synchronous discussion virtually to substitute for in-person clinic visit. Patient and provider were located at their individual homes.       Breanna Charles MD

## 2020-05-18 NOTE — PROGRESS NOTES
Chief Complaint   Patient presents with    Anxiety     follow up      1. Have you been to the ER, urgent care clinic since your last visit? Hospitalized since your last visit? No    2. Have you seen or consulted any other health care providers outside of the [address] [place] [address] since your last visit? Include any pap smears or colon screening.  No

## 2020-05-19 ENCOUNTER — VIRTUAL VISIT (OUTPATIENT)
Dept: INTERNAL MEDICINE CLINIC | Age: 30
End: 2020-05-19

## 2020-05-19 DIAGNOSIS — F33.0 MILD EPISODE OF RECURRENT MAJOR DEPRESSIVE DISORDER (HCC): Primary | ICD-10-CM

## 2020-05-19 DIAGNOSIS — F41.9 ANXIETY: ICD-10-CM
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
## 2020-05-31 DIAGNOSIS — M62.838 MUSCLE SPASM: ICD-10-CM

## 2020-05-31 DIAGNOSIS — F41.9 ANXIETY: ICD-10-CM

## 2020-06-02 ENCOUNTER — VIRTUAL VISIT (OUTPATIENT)
Dept: INTERNAL MEDICINE CLINIC | Age: 30
End: 2020-06-02

## 2020-06-02 DIAGNOSIS — F33.0 MILD EPISODE OF RECURRENT MAJOR DEPRESSIVE DISORDER (HCC): ICD-10-CM

## 2020-06-02 DIAGNOSIS — F41.9 ANXIETY: Primary | ICD-10-CM

## 2020-06-02 RX ORDER — DIAZEPAM 5 MG/1
5 TABLET ORAL
Qty: 15 TAB | Refills: 0 | Status: SHIPPED | OUTPATIENT
Start: 2020-06-02 | End: 2020-07-24 | Stop reason: SDUPTHER
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
## 2020-06-03 ENCOUNTER — VIRTUAL VISIT (OUTPATIENT)
Dept: INTERNAL MEDICINE CLINIC | Age: 30
End: 2020-06-03

## 2020-06-03 DIAGNOSIS — R35.0 URINARY FREQUENCY: Primary | ICD-10-CM

## 2020-06-03 DIAGNOSIS — F41.9 ANXIETY: ICD-10-CM

## 2020-06-03 DIAGNOSIS — M54.6 ACUTE RIGHT-SIDED THORACIC BACK PAIN: ICD-10-CM

## 2020-06-03 DIAGNOSIS — N39.0 URINARY TRACT INFECTION WITHOUT HEMATURIA, SITE UNSPECIFIED: ICD-10-CM

## 2020-06-03 NOTE — PROGRESS NOTES
Chief Complaint   Patient presents with    Medication Evaluation     follow up     Bladder Infection     1. Have you been to the ER, urgent care clinic since your last visit? Hospitalized since your last visit? No    2. Have you seen or consulted any other health care providers outside of the [address] [place] [address] since your last visit? Include any pap smears or colon screening.  No

## 2020-06-04 ENCOUNTER — OFFICE VISIT (OUTPATIENT)
Dept: INTERNAL MEDICINE CLINIC | Age: 30
End: 2020-06-04

## 2020-06-04 VITALS
HEIGHT: 65 IN | BODY MASS INDEX: 39.95 KG/M2 | TEMPERATURE: 98.1 F | WEIGHT: 239.8 LBS | DIASTOLIC BLOOD PRESSURE: 84 MMHG | SYSTOLIC BLOOD PRESSURE: 118 MMHG | OXYGEN SATURATION: 96 % | RESPIRATION RATE: 14 BRPM | HEART RATE: 88 BPM

## 2020-06-04 DIAGNOSIS — R30.0 DYSURIA: ICD-10-CM

## 2020-06-04 DIAGNOSIS — R35.0 URINARY FREQUENCY: ICD-10-CM

## 2020-06-04 DIAGNOSIS — M54.6 ACUTE RIGHT-SIDED THORACIC BACK PAIN: Primary | ICD-10-CM

## 2020-06-04 DIAGNOSIS — R39.15 URINARY URGENCY: ICD-10-CM

## 2020-06-04 DIAGNOSIS — Z79.899 ENCOUNTER FOR LONG-TERM (CURRENT) USE OF OTHER MEDICATIONS: ICD-10-CM

## 2020-06-04 LAB
BILIRUB UR QL STRIP: NEGATIVE
GLUCOSE POC: 94 MG/DL
GLUCOSE UR-MCNC: NEGATIVE MG/DL
HCG URINE, QL. (POC): NEGATIVE
KETONES P FAST UR STRIP-MCNC: NEGATIVE MG/DL
PH UR STRIP: 6 [PH] (ref 4.6–8)
PROT UR QL STRIP: NEGATIVE
SP GR UR STRIP: 1.02 (ref 1–1.03)
UA UROBILINOGEN AMB POC: NORMAL (ref 0.2–1)
URINALYSIS CLARITY POC: CLEAR
URINALYSIS COLOR POC: YELLOW
URINE BLOOD POC: NORMAL
URINE LEUKOCYTES POC: NEGATIVE
URINE NITRITES POC: NEGATIVE
VALID INTERNAL CONTROL?: NORMAL

## 2020-06-04 NOTE — PROGRESS NOTES
SPORTS MEDICINE AND PRIMARY CARE  Taniya Smith MD  [address] [address]    Chief Complaint   Patient presents with    Follow-up       SUBJECTIVE:    Osmin Ogden is a 30 y.o. female   following up from a virtual visit this week for reported urinary frequency and right-sided back pain. She spoke to her pharmacist and he recommended that she increase fluids and cranberry juice. Her back pain resolved. Urinary frequency persists. Patient wonders of exercise because the back pain. Patient denies dysuria, urinary urgency, vaginal discharge, fever, chills, pelvic discomfort. Patient is  and she uses Depo-Provera for contraception. Current Outpatient Medications   Medication Sig Dispense Refill    diazePAM (VALIUM) 5 mg tablet Take 1 Tab by mouth every eight (8) hours as needed for Anxiety. Max Daily Amount: 15 mg. DO NOT BREAST FEED WHILE TAKING THIS MEDICATION 15 Tab 0    acetaminophen (TYLENOL) 500 mg tablet Take 1 Tab by mouth every six (6) hours as needed for Pain. 50 Tab 0    busPIRone (BUSPAR) 7.5 mg tablet Take 1 Tab by mouth three (3) times daily. Take for anxiety 90 Tab 5    escitalopram oxalate (LEXAPRO) 10 mg tablet Take 1 Tab by mouth daily.  27 Tab 5     Past Medical History:   Diagnosis Date    Abnormal Pap smear     Anemia NEC     per pt takes iron PO    Asthma     bronchitis    Breastfeeding (infant)     Complication of anesthesia     never had    Depression     HX OTHER MEDICAL     Pregnant    Mild episode of recurrent major depressive disorder ([facility] 4/21/2020     Past Surgical History:   Procedure Laterality Date    HX GYN      c section     No Known Allergies    REVIEW OF SYSTEMS:  Pertinent positives and negatives are listed in HPI    Social History     Socioeconomic History    Marital status:      Spouse name: Not on file    Number of children: Not on file    Years of education: Not on file    Highest education level: Not on file   Tobacco Use    Smoking status: Current Every Day Smoker     Packs/day: 0.50     Years: 3.00     Pack years: 1.50    Smokeless tobacco: Never Used   Substance and Sexual Activity    Alcohol use: Not Currently    Drug use: No    Sexual activity: Yes     Partners: Male     Birth control/protection: None     Family History   Problem Relation Age of Onset    Cancer Maternal Grandfather     Diabetes Paternal Grandmother     Diabetes Father     Hypertension Father        OBJECTIVE:     Visit Vitals  /84   Pulse 88   Temp 98.1 °F (36.7 °C) (Oral)   Resp 14   Ht 5' 5\" (1.651 m)   Wt 239 lb 12.8 oz (108.8 kg)   SpO2 96%   BMI 39.90 kg/m²     CONSTITUTIONAL: well developed, well nourished no acute distress,   EYES: perrla, eom intact  ENMT:moist mucous membranes,   NECK: supple. Thyroid normal  RESPIRATORY: Chest: clear bilaterally  CARDIOVASCULAR: Heart: regular rate and rhythm  GASTROINTESTINAL: Abdomen: soft, bowel sounds active, -cvat, -spt  HEMATOLOGIC: no pathological lymph nodes palpated  MUSCULOSKELETAL: Extremities: no edema   INTEGUMENT: No unusual rashes or suspicious skin lesions noted.  Nails appear normal.  NEUROLOGIC: non-focal exam   MENTAL STATUS: alert and oriented, appropriate affect     Office Visit on 06/04/2020   Component Date Value Ref Range Status    Color (UA POC) 06/04/2020 Yellow   Preliminary    Clarity (UA POC) 06/04/2020 Clear   Preliminary    Glucose (UA POC) 06/04/2020 Negative  Negative Preliminary    Bilirubin (UA POC) 06/04/2020 Negative  Negative Preliminary    Ketones (UA POC) 06/04/2020 Negative  Negative Preliminary    Specific gravity (UA POC) 06/04/2020 1.025  1.001 - 1.035 Preliminary    Blood (UA POC) 06/04/2020 Trace  Negative Preliminary    pH (UA POC) 06/04/2020 6.0  4.6 - 8.0 Preliminary    Protein (UA POC) 06/04/2020 Negative  Negative Preliminary    Urobilinogen (UA POC) 06/04/2020 0.2 mg/dL  0.2 - 1 Preliminary    Nitrites (UA POC) 06/04/2020 Negative Negative Preliminary    Leukocyte esterase (UA POC) 06/04/2020 Negative  Negative Preliminary    HCG urine, Ql. (POC) 06/04/2020 Negative  Negative Preliminary    Glucose POC 06/04/2020 94  mg/dL Preliminary       ASSESSMENT:   1. Dysuria and urinary urgency -consider urinary tract infection, stones, vaginitis   2. Anxiety and depression-stable on medications   3. I have discussed the diagnosis with the patient and the intended plan as seen in the  orders. The patient understands and agees with the plan. The patient has   received an after visit summary and questions were answered concerning  future plans  Patient labs and/or xrays were reviewed  Past records were reviewed. PLAN:  .  Orders Placed This Encounter    AMB POC URINALYSIS DIP STICK AUTO W/O MICRO    AMB POC URINE PREGNANCY TEST, VISUAL COLOR COMPARISON    AMB POC GLUCOSE BLOOD, BY GLUCOSE MONITORING DEVICE   keflex 500 mg qid #28  Pelvic rest  urine trich, GC, chlamydia  Advised patient to call back or return to office if symptoms worsen/change/persist.  Discussed expected course/resolution/complications of diagnosis in detail with patient. Medication risks/benefits/costs/interactions/alternatives discussed with patient  Counseled regarding diet, exercise and healthy lifestyle  RTO 3 mo    SignedArmaan M.D. A total of at least 15 min was spent during this evaluation of which half was spent in counseling and care coordination.

## 2020-06-04 NOTE — PROGRESS NOTES
1. Have you been to the ER, urgent care clinic since your last visit? Hospitalized since your last visit? No    2. Have you seen or consulted any other health care providers outside of the [address] [place] [address] since your last visit? Include any pap smears or colon screening.  No

## 2020-06-06 RX ORDER — CEPHALEXIN 500 MG/1
500 CAPSULE ORAL 4 TIMES DAILY
Qty: 28 CAP | Refills: 0 | Status: SHIPPED | OUTPATIENT
Start: 2020-06-06 | End: 2020-06-13

## 2020-06-06 NOTE — PROGRESS NOTES
SPORTS MEDICINE AND PRIMARY CARE  Krystin Smith MD  [address] [address]    Chief Complaint   Patient presents with    Medication Evaluation     follow up     Bladder Infection       SUBJECTIVE:    Yoseph Rosas is a 30 y.o. female who reported urinary urgency and right-sided back pain at a virtual visit this week. Patient reports that back pain has resolved after increasing hydration. She has persistent urgency but denies dysuria, vaginal discharge, pelvic pain, fever, chills, nausea or vomiting. Patient is  and uses Depo-Provera for contraception only. Current Outpatient Medications   Medication Sig Dispense Refill    cephALEXin (KEFLEX) 500 mg capsule Take 1 Cap by mouth four (4) times daily for 7 days. 28 Cap 0    acetaminophen (TYLENOL) 500 mg tablet Take 1 Tab by mouth every six (6) hours as needed for Pain. 50 Tab 0    busPIRone (BUSPAR) 7.5 mg tablet Take 1 Tab by mouth three (3) times daily. Take for anxiety 90 Tab 5    escitalopram oxalate (LEXAPRO) 10 mg tablet Take 1 Tab by mouth daily. 30 Tab 5    diazePAM (VALIUM) 5 mg tablet Take 1 Tab by mouth every eight (8) hours as needed for Anxiety.  Max Daily Amount: 15 mg. DO NOT BREAST FEED WHILE TAKING THIS MEDICATION 15 Tab 0     Past Medical History:   Diagnosis Date    Abnormal Pap smear     Anemia NEC     per pt takes iron PO    Asthma     bronchitis    Breastfeeding (infant)     Complication of anesthesia     never had    Depression     HX OTHER MEDICAL     Pregnant    Mild episode of recurrent major depressive disorder ([facility] [address] 4/21/2020     Past Surgical History:   Procedure Laterality Date    HX GYN      c section     No Known Allergies    REVIEW OF SYSTEMS:  Per HPI    Social History     Socioeconomic History    Marital status:      Spouse name: Not on file    Number of children: Not on file    Years of education: Not on file    Highest education level: Not on file   Tobacco Use    Smoking status: Current Every Day Smoker     Packs/day: 0.50     Years: 3.00     Pack years: 1.50    Smokeless tobacco: Never Used   Substance and Sexual Activity    Alcohol use: Not Currently    Drug use: No    Sexual activity: Yes     Partners: Male     Birth control/protection: None     Family History   Problem Relation Age of Onset    Cancer Maternal Grandfather     Diabetes Paternal Grandmother     Diabetes Father     Hypertension Father        OBJECTIVE:     There were no vitals taken for this visit. CONSTITUTIONAL: well developed, well nourished, appears age appropriate  EYES: perrla, eom intact  ENMT:moist mucous membranes,   NECK: supple. Thyroid normal  RESPIRATORY: Chest: clear bilaterally  CARDIOVASCULAR: Heart: regular rate and rhythm  GASTROINTESTINAL: Abdomen: soft, bowel sounds active, no spt, no cvat  MUSCULOSKELETAL: Extremities: no edema,  INTEGUMENT: Normal texture and turgor   NEUROLOGIC: non-focal exam   MENTAL STATUS: alert and oriented, appropriate affect     ASSESSMENT:   1. Urinary tract infection possible without hematuria, site unspecified    2.    3.  depression, anxiety-stable     I have discussed the diagnosis with the patient and the intended plan as seen in the  orders. The patient understands and agees with the plan. The patient has   received an after visit summary and questions were answered concerning  future plans  Patient labs and/or xrays were reviewed  Past records were reviewed. PLAN:  .  Orders Placed This Encounter    cephALEXin (KEFLEX) 500 mg capsule   fluids pelvic rest  Continue chronic medications  Ua, urine culture    Advised patient to call back or return to office if symptoms worsen/change/persist.  Discussed expected course/resolution/complications of diagnosis in detail with patient.      Medication risks/benefits/costs/interactions/alternatives discussed with patient     RTO 3 mo    Inez Lynne M.D.

## 2020-06-13 DIAGNOSIS — R51.9 NONINTRACTABLE HEADACHE, UNSPECIFIED CHRONICITY PATTERN, UNSPECIFIED HEADACHE TYPE: ICD-10-CM

## 2020-06-16 ENCOUNTER — VIRTUAL VISIT (OUTPATIENT)
Dept: INTERNAL MEDICINE CLINIC | Age: 30
End: 2020-06-16

## 2020-06-16 DIAGNOSIS — F33.0 MILD EPISODE OF RECURRENT MAJOR DEPRESSIVE DISORDER (HCC): Primary | ICD-10-CM

## 2020-06-16 DIAGNOSIS — F41.9 ANXIETY: ICD-10-CM

## 2020-06-16 RX ORDER — ACETAMINOPHEN 500 MG
500 TABLET ORAL
Qty: 50 TAB | Refills: 0 | Status: SHIPPED | OUTPATIENT
Start: 2020-06-16 | End: 2020-07-27 | Stop reason: SDUPTHER
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
## 2020-06-24 ENCOUNTER — VIRTUAL VISIT (OUTPATIENT)
Dept: INTERNAL MEDICINE CLINIC | Age: 30
End: 2020-06-24

## 2020-06-24 DIAGNOSIS — F41.9 ANXIETY: ICD-10-CM

## 2020-06-24 DIAGNOSIS — F33.0 MILD EPISODE OF RECURRENT MAJOR DEPRESSIVE DISORDER (HCC): Primary | ICD-10-CM

## 2020-06-24 PROBLEM — F32.A DEPRESSION: Status: RESOLVED | Noted: 2020-05-18 | Resolved: 2020-06-24
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
## 2020-07-13 ENCOUNTER — VIRTUAL VISIT (OUTPATIENT)
Dept: INTERNAL MEDICINE CLINIC | Age: 30
End: 2020-07-13

## 2020-07-13 DIAGNOSIS — F41.9 ANXIETY: ICD-10-CM

## 2020-07-13 DIAGNOSIS — F33.0 MILD EPISODE OF RECURRENT MAJOR DEPRESSIVE DISORDER (HCC): Primary | ICD-10-CM

## 2020-07-24 DIAGNOSIS — M62.838 MUSCLE SPASM: ICD-10-CM

## 2020-07-24 DIAGNOSIS — F41.9 ANXIETY: ICD-10-CM

## 2020-07-27 DIAGNOSIS — R51.9 NONINTRACTABLE HEADACHE, UNSPECIFIED CHRONICITY PATTERN, UNSPECIFIED HEADACHE TYPE: ICD-10-CM

## 2020-07-27 RX ORDER — ACETAMINOPHEN 500 MG
500 TABLET ORAL
Qty: 50 TAB | Refills: 0 | Status: SHIPPED | OUTPATIENT
Start: 2020-07-27 | End: 2020-10-05 | Stop reason: SDUPTHER

## 2020-07-27 RX ORDER — DIAZEPAM 5 MG/1
5 TABLET ORAL
Qty: 15 TAB | Refills: 0 | Status: SHIPPED | OUTPATIENT
Start: 2020-07-27 | End: 2020-07-31 | Stop reason: SDUPTHER

## 2020-07-31 DIAGNOSIS — F41.9 ANXIETY: ICD-10-CM

## 2020-07-31 DIAGNOSIS — M62.838 MUSCLE SPASM: ICD-10-CM

## 2020-08-03 ENCOUNTER — VIRTUAL VISIT (OUTPATIENT)
Dept: INTERNAL MEDICINE CLINIC | Age: 30
End: 2020-08-03
Payer: MEDICAID

## 2020-08-03 DIAGNOSIS — F33.0 MILD EPISODE OF RECURRENT MAJOR DEPRESSIVE DISORDER (HCC): Primary | ICD-10-CM

## 2020-08-03 DIAGNOSIS — F41.9 ANXIETY: ICD-10-CM

## 2020-08-03 PROCEDURE — 90837 PSYTX W PT 60 MINUTES: CPT | Performed by: SOCIAL WORKER
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
## 2020-08-11 RX ORDER — DIAZEPAM 5 MG/1
5 TABLET ORAL
Qty: 15 TAB | Refills: 0 | Status: SHIPPED | OUTPATIENT
Start: 2020-08-11 | End: 2020-10-05 | Stop reason: SDUPTHER

## 2020-09-14 ENCOUNTER — VIRTUAL VISIT (OUTPATIENT)
Dept: INTERNAL MEDICINE CLINIC | Age: 30
End: 2020-09-14
Payer: MEDICAID

## 2020-09-14 DIAGNOSIS — F33.0 MILD EPISODE OF RECURRENT MAJOR DEPRESSIVE DISORDER (HCC): Primary | ICD-10-CM

## 2020-09-14 DIAGNOSIS — F41.9 ANXIETY: ICD-10-CM

## 2020-09-14 PROCEDURE — 90837 PSYTX W PT 60 MINUTES: CPT | Performed by: SOCIAL WORKER
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
## 2020-10-05 DIAGNOSIS — R51.9 NONINTRACTABLE HEADACHE, UNSPECIFIED CHRONICITY PATTERN, UNSPECIFIED HEADACHE TYPE: ICD-10-CM

## 2020-10-05 DIAGNOSIS — M62.838 MUSCLE SPASM: ICD-10-CM

## 2020-10-05 DIAGNOSIS — F41.9 ANXIETY: ICD-10-CM

## 2020-10-06 RX ORDER — DIAZEPAM 5 MG/1
5 TABLET ORAL
Qty: 15 TAB | Refills: 0 | Status: SHIPPED | OUTPATIENT
Start: 2020-10-06 | End: 2020-10-22 | Stop reason: SDUPTHER

## 2020-10-06 RX ORDER — ACETAMINOPHEN 500 MG
500 TABLET ORAL
Qty: 50 TAB | Refills: 0 | Status: SHIPPED | OUTPATIENT
Start: 2020-10-06 | End: 2021-01-05 | Stop reason: SDUPTHER

## 2020-10-07 DIAGNOSIS — F41.9 ANXIETY: ICD-10-CM

## 2020-10-07 DIAGNOSIS — F32.1 CURRENT MODERATE EPISODE OF MAJOR DEPRESSIVE DISORDER WITHOUT PRIOR EPISODE (HCC): ICD-10-CM

## 2020-10-07 RX ORDER — BUSPIRONE HYDROCHLORIDE 7.5 MG/1
7.5 TABLET ORAL 3 TIMES DAILY
Qty: 90 TAB | Refills: 2 | Status: SHIPPED | OUTPATIENT
Start: 2020-10-07 | End: 2021-05-04 | Stop reason: SDUPTHER

## 2020-10-07 RX ORDER — ESCITALOPRAM OXALATE 10 MG/1
10 TABLET ORAL DAILY
Qty: 30 TAB | Refills: 2 | Status: SHIPPED | OUTPATIENT
Start: 2020-10-07 | End: 2021-04-29 | Stop reason: ALTCHOICE

## 2020-10-10 ENCOUNTER — E-VISIT (OUTPATIENT)
Dept: INTERNAL MEDICINE CLINIC | Age: 30
End: 2020-10-10

## 2020-10-12 DIAGNOSIS — R51.9 NONINTRACTABLE HEADACHE, UNSPECIFIED CHRONICITY PATTERN, UNSPECIFIED HEADACHE TYPE: ICD-10-CM

## 2020-10-13 RX ORDER — ACETAMINOPHEN 500 MG
500 TABLET ORAL
Qty: 50 TAB | Refills: 0 | OUTPATIENT
Start: 2020-10-13

## 2020-10-16 ENCOUNTER — VIRTUAL VISIT (OUTPATIENT)
Dept: INTERNAL MEDICINE CLINIC | Age: 30
End: 2020-10-16
Payer: MEDICAID

## 2020-10-16 DIAGNOSIS — J20.9 ACUTE BRONCHITIS, UNSPECIFIED ORGANISM: Primary | ICD-10-CM

## 2020-10-16 PROCEDURE — 99213 OFFICE O/P EST LOW 20 MIN: CPT | Performed by: FAMILY MEDICINE

## 2020-10-16 RX ORDER — DOXYCYCLINE 100 MG/1
100 TABLET ORAL 2 TIMES DAILY
Qty: 20 TAB | Refills: 0 | Status: SHIPPED | OUTPATIENT
Start: 2020-10-16 | End: 2020-10-26

## 2020-10-16 RX ORDER — PREDNISONE 20 MG/1
20 TABLET ORAL 2 TIMES DAILY
Qty: 10 TAB | Refills: 0 | Status: SHIPPED | OUTPATIENT
Start: 2020-10-16 | End: 2020-11-13 | Stop reason: SDUPTHER

## 2020-10-16 RX ORDER — ALBUTEROL SULFATE 90 UG/1
1 AEROSOL, METERED RESPIRATORY (INHALATION)
Qty: 1 INHALER | Refills: 0 | Status: SHIPPED | OUTPATIENT
Start: 2020-10-16 | End: 2021-02-15 | Stop reason: SDUPTHER

## 2020-10-16 RX ORDER — BENZONATATE 100 MG/1
100 CAPSULE ORAL
Qty: 30 CAP | Refills: 0 | Status: SHIPPED | OUTPATIENT
Start: 2020-10-16 | End: 2020-10-26

## 2020-10-16 NOTE — PROGRESS NOTES
Chief Complaint   Patient presents with    Breathing Problem     Patient states she has broncitis and she is having a hard time breathing. 1. Have you been to the ER, urgent care clinic since your last visit? Hospitalized since your last visit? No    2. Have you seen or consulted any other health care providers outside of the [address] [place] [address] since your last visit? Include any pap smears or colon screening.  No

## 2020-10-22 DIAGNOSIS — F41.9 ANXIETY: ICD-10-CM

## 2020-10-22 DIAGNOSIS — M62.838 MUSCLE SPASM: ICD-10-CM

## 2020-10-22 RX ORDER — DIAZEPAM 5 MG/1
5 TABLET ORAL
Qty: 15 TAB | Refills: 0 | Status: SHIPPED | OUTPATIENT
Start: 2020-10-22 | End: 2021-01-05 | Stop reason: SDUPTHER

## 2020-10-25 NOTE — PROGRESS NOTES
Jaja Banegas is a 35 y.o. female who was seen by synchronous (real-time) audio-video technology on 10/16/2020 for Breathing Problem (Patient states she has broncitis and she is having a hard time breathing. )        Assessment & Plan:   Diagnoses and all orders for this visit:    1. Acute bronchitis, unspecified organism  -     predniSONE (DELTASONE) 20 mg tablet; Take 20 mg by mouth two (2) times a day. Indication: cough and chest congestion  -     benzonatate (Tessalon Perles) 100 mg capsule; Take 1 Cap by mouth three (3) times daily as needed for Cough for up to 10 days. -     albuterol (PROVENTIL HFA, VENTOLIN HFA, PROAIR HFA) 90 mcg/actuation inhaler; Take 1 Puff by inhalation every four (4) hours as needed for Wheezing.  -     doxycycline (ADOXA) 100 mg tablet; Take 1 Tab by mouth two (2) times a day for 10 days. Fluids, rest, stop tobacco    Advised patient to call back or return to office if symptoms worsen/change/persist.  Discussed expected course/resolution/complications of diagnosis in detail with patient. Medication risks/benefits/costs/interactions/alternatives discussed with patientI have discussed the diagnosis with the patient and the intended plan as seen in the  orders above. The patient understands and agrees with the plan. All questions the patient posed were answered. Patient labs and/or xrays were reviewed  Past records were reviewed. RTO 2 wks    Subjective:     35year-old established patient for cough evaluation. Patient reports productive cough with yellow sputum x4 weeks. The cough is deep. Patient denies chest pain, shortness of breath, wheezing fever nasal congestion, muscle aches, chills vomiting or gastrointestinal symptoms. Patient tested negative for Covid. Patient smokes cigarettes. Patient denies chronic respiratory diagnosis. Prior to Admission medications    Medication Sig Start Date End Date Taking?  Authorizing Provider   predniSONE (DELTASONE) 20 mg tablet Take 20 mg by mouth two (2) times a day. Indication: cough and chest congestion 10/16/20  Yes Hugo Castellanos MD   benzonatate (Tessalon Perles) 100 mg capsule Take 1 Cap by mouth three (3) times daily as needed for Cough for up to 10 days. 10/16/20 10/26/20 Yes Hugo Castellanos MD   albuterol (PROVENTIL HFA, VENTOLIN HFA, PROAIR HFA) 90 mcg/actuation inhaler Take 1 Puff by inhalation every four (4) hours as needed for Wheezing. 10/16/20  Yes Hugo Castellanos MD   doxycycline (ADOXA) 100 mg tablet Take 1 Tab by mouth two (2) times a day for 10 days. 10/16/20 10/26/20 Yes Hugo Castellanos MD   busPIRone (BUSPAR) 7.5 mg tablet Take 1 Tab by mouth three (3) times daily. Take for anxiety 10/7/20  Yes Hugo Castellanos MD   escitalopram oxalate (LEXAPRO) 10 mg tablet Take 1 Tab by mouth daily. 10/7/20  Yes Hugo Castellanos MD   acetaminophen (TYLENOL) 500 mg tablet Take 1 Tab by mouth every six (6) hours as needed for Pain. 10/6/20  Yes Hugo Castellanos MD   diazePAM (VALIUM) 5 mg tablet Take 1 Tab by mouth every eight (8) hours as needed for Anxiety. Max Daily Amount: 15 mg. appiontment required for additional refills 10/22/20   Hugo Castellanos MD     Reviewed PmHx, RxHx, FmHx, SocHx, AllgHx and updated and dated in the chart. Review of Systems   Constitutional: Negative for chills, fever and malaise/fatigue. HENT: Positive for sore throat. Negative for congestion and ear pain. Respiratory: Positive for cough and sputum production. Negative for hemoptysis, shortness of breath and wheezing. Cardiovascular: Negative for chest pain. Gastrointestinal: Negative for abdominal pain, diarrhea, nausea and vomiting. Musculoskeletal: Negative for myalgias. Neurological: Negative for headaches. Objective:   No flowsheet data found.      [INSTRUCTIONS:  \"[x]\" Indicates a positive item  \"[]\" Indicates a negative item  -- DELETE ALL ITEMS NOT EXAMINED]    Constitutional: [x] Appears well-developed and well-nourished [x] No apparent distress      [] Abnormal -     Mental status: [x] Alert and awake  [x] Oriented to person/place/time [x] Able to follow commands    [] Abnormal -     Eyes:   EOM    [x]  Normal    [] Abnormal -   Sclera  [x]  Normal    [] Abnormal -          Discharge [x]  None visible   [] Abnormal -     HENT: [x] Normocephalic, atraumatic  [] Abnormal -   [x] Mouth/Throat: Mucous membranes are moist    External Ears [x] Normal  [] Abnormal -    Neck: [x] No visualized mass [] Abnormal -     Pulmonary/Chest: [x] Respiratory effort normal   [x] No visualized signs of difficulty breathing or respiratory distress        [] Abnormal -      Musculoskeletal:   [] Normal gait with no signs of ataxia         [x] Normal range of motion of neck        [] Abnormal -     Neurological:        [x] No Facial Asymmetry (Cranial nerve 7 motor function) (limited exam due to video visit)          [x] No gaze palsy        [] Abnormal -          Skin:        [x] No significant exanthematous lesions or discoloration noted on facial skin         [] Abnormal -            Psychiatric:       [x] Normal Affect [] Abnormal -        [x] No Hallucinations    Other pertinent observable physical exam findings:-        We discussed the expected course, resolution and complications of the diagnosis(es) in detail. Medication risks, benefits, costs, interactions, and alternatives were discussed as indicated. I advised her to contact the office if her condition worsens, changes or fails to improve as anticipated. She expressed understanding with the diagnosis(es) and plan. Fabby Velasquez, who was evaluated through a patient-initiated, synchronous (real-time) audio-video encounter, and/or her healthcare decision maker, is aware that it is a billable service, with coverage as determined by her insurance carrier. She provided verbal consent to proceed: Yes, and patient identification was verified.  It was conducted pursuant to the emergency declaration under the [facility], [address] [place] [address] authority and the [employer] General Act. A caregiver was present when appropriate. Ability to conduct physical exam was limited. I was in the office. The patient was at home.       Sabrina Patrick MD

## 2020-10-30 ENCOUNTER — VIRTUAL VISIT (OUTPATIENT)
Dept: INTERNAL MEDICINE CLINIC | Age: 30
End: 2020-10-30
Payer: MEDICAID

## 2020-10-30 DIAGNOSIS — J20.9 ACUTE BRONCHITIS, UNSPECIFIED ORGANISM: Primary | ICD-10-CM

## 2020-10-30 PROCEDURE — 99212 OFFICE O/P EST SF 10 MIN: CPT | Performed by: FAMILY MEDICINE

## 2020-11-03 NOTE — PROGRESS NOTES
Miriam Kong is a 35 y.o. female who was seen by synchronous (real-time) audio-video technology on 10/30/2020 for Cough and Chest Pain        Assessment & Plan:   Diagnoses and all orders for this visit:    1. Acute bronchitis, unspecified organism-improved    Encouraged flu vaccine    Advised patient to call back or return to office if symptoms worsen/change/persist.  Discussed expected course/resolution/complications of diagnosis in detail with patient. Medication risks/benefits/costs/interactions/alternatives discussed with patientI have discussed the diagnosis with the patient and the intended plan as seen in the  orders above. The patient understands and agees with the plan. All questions the patient posed were answered. Patient labs and/or xrays were reviewed  Past records were reviewed. Subjective: Following up on acute bronchitis. Patient is symptomatically better. She denies shortness of breath wheezing or chest pain. She completed all medications. Prior to Admission medications    Medication Sig Start Date End Date Taking? Authorizing Provider   diazePAM (VALIUM) 5 mg tablet Take 1 Tab by mouth every eight (8) hours as needed for Anxiety. Max Daily Amount: 15 mg. appiontment required for additional refills 10/22/20  Yes Gertrude Walton MD   predniSONE (DELTASONE) 20 mg tablet Take 20 mg by mouth two (2) times a day. Indication: cough and chest congestion 10/16/20  Yes Gertrude Walton MD   albuterol (PROVENTIL HFA, VENTOLIN HFA, PROAIR HFA) 90 mcg/actuation inhaler Take 1 Puff by inhalation every four (4) hours as needed for Wheezing. 10/16/20  Yes Gertrude Walton MD   busPIRone (BUSPAR) 7.5 mg tablet Take 1 Tab by mouth three (3) times daily. Take for anxiety 10/7/20  Yes Gertrude Walton MD   escitalopram oxalate (LEXAPRO) 10 mg tablet Take 1 Tab by mouth daily.  10/7/20  Yes Gertrude Walton MD   acetaminophen (TYLENOL) 500 mg tablet Take 1 Tab by mouth every six (6) hours as needed for Pain. 10/6/20  Yes Bhavya Doty MD     Reviewed PmHx, RxHx, FmHx, SocHx, AllgHx and updated and dated in the chart. ROS  Pertinent positives and negatives are listed in HPI    Objective:   No flowsheet data found. [INSTRUCTIONS:  \"[x]\" Indicates a positive item  \"[]\" Indicates a negative item  -- DELETE ALL ITEMS NOT EXAMINED]    Constitutional: [x] Appears well-developed and well-nourished [x] No apparent distress      [] Abnormal -     Mental status: [x] Alert and awake  [x] Oriented to person/place/time [x] Able to follow commands    [] Abnormal -     Eyes:   EOM    [x]  Normal    [] Abnormal -   Sclera  [x]  Normal    [] Abnormal -          Discharge [x]  None visible   [] Abnormal -     HENT: [x] Normocephalic, atraumatic  [] Abnormal -   [x] Mouth/Throat: Mucous membranes are moist    External Ears [x] Normal  [] Abnormal -    Neck: [x] No visualized mass [] Abnormal -     Pulmonary/Chest: [x] Respiratory effort normal   [x] No visualized signs of difficulty breathing or respiratory distress        [] Abnormal -      Musculoskeletal:   [] Normal gait with no signs of ataxia         [x] Normal range of motion of neck        [] Abnormal -     Neurological:        [x] No Facial Asymmetry (Cranial nerve 7 motor function) (limited exam due to video visit)          [x] No gaze palsy        [] Abnormal -          Skin:        [x] No significant exanthematous lesions or discoloration noted on facial skin         [] Abnormal -            Psychiatric:       [x] Normal Affect [] Abnormal -        [x] No Hallucinations    Other pertinent observable physical exam findings:-        We discussed the expected course, resolution and complications of the diagnosis(es) in detail. Medication risks, benefits, costs, interactions, and alternatives were discussed as indicated. I advised her to contact the office if her condition worsens, changes or fails to improve as anticipated.  She expressed understanding with the diagnosis(es) and plan. Anel Higgins, who was evaluated through a patient-initiated, synchronous (real-time) audio-video encounter, and/or her healthcare decision maker, is aware that it is a billable service, with coverage as determined by her insurance carrier. She provided verbal consent to proceed: Yes, and patient identification was verified. It was conducted pursuant to the emergency declaration under the [address] [place] [address] and the [employer] and [employer] General Act. A caregiver was present when appropriate. Ability to conduct physical exam was limited. I was in the office. The patient was at home.       Bunny Rincon MD

## 2020-11-05 ENCOUNTER — VIRTUAL VISIT (OUTPATIENT)
Dept: INTERNAL MEDICINE CLINIC | Age: 30
End: 2020-11-05
Payer: MEDICAID

## 2020-11-05 DIAGNOSIS — F33.0 MILD EPISODE OF RECURRENT MAJOR DEPRESSIVE DISORDER (HCC): Primary | ICD-10-CM

## 2020-11-05 DIAGNOSIS — F41.9 ANXIETY: ICD-10-CM

## 2020-11-05 PROCEDURE — 90837 PSYTX W PT 60 MINUTES: CPT | Performed by: SOCIAL WORKER
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
## 2020-11-13 DIAGNOSIS — J20.9 ACUTE BRONCHITIS, UNSPECIFIED ORGANISM: ICD-10-CM

## 2020-11-13 RX ORDER — BENZONATATE 100 MG/1
100 CAPSULE ORAL
Qty: 30 CAP | Refills: 0 | Status: SHIPPED | OUTPATIENT
Start: 2020-11-13 | End: 2020-11-23

## 2020-11-13 RX ORDER — PREDNISONE 20 MG/1
20 TABLET ORAL 2 TIMES DAILY
Qty: 10 TAB | Refills: 0 | Status: SHIPPED | OUTPATIENT
Start: 2020-11-13 | End: 2021-02-15 | Stop reason: SDUPTHER

## 2020-12-08 ENCOUNTER — VIRTUAL VISIT (OUTPATIENT)
Dept: INTERNAL MEDICINE CLINIC | Age: 30
End: 2020-12-08
Payer: MEDICAID

## 2020-12-08 DIAGNOSIS — F33.0 MILD EPISODE OF RECURRENT MAJOR DEPRESSIVE DISORDER (HCC): Primary | ICD-10-CM

## 2020-12-08 DIAGNOSIS — F41.9 ANXIETY: ICD-10-CM

## 2020-12-08 PROCEDURE — 90837 PSYTX W PT 60 MINUTES: CPT | Performed by: SOCIAL WORKER
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
## 2020-12-31 ENCOUNTER — VIRTUAL VISIT (OUTPATIENT)
Dept: INTERNAL MEDICINE CLINIC | Age: 30
End: 2020-12-31
Payer: MEDICAID

## 2020-12-31 DIAGNOSIS — F41.9 ANXIETY: ICD-10-CM

## 2020-12-31 DIAGNOSIS — F33.0 MILD EPISODE OF RECURRENT MAJOR DEPRESSIVE DISORDER (HCC): Primary | ICD-10-CM

## 2020-12-31 PROCEDURE — 90837 PSYTX W PT 60 MINUTES: CPT | Performed by: SOCIAL WORKER
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
## 2021-01-05 DIAGNOSIS — R51.9 NONINTRACTABLE HEADACHE, UNSPECIFIED CHRONICITY PATTERN, UNSPECIFIED HEADACHE TYPE: ICD-10-CM

## 2021-01-05 DIAGNOSIS — F41.9 ANXIETY: ICD-10-CM

## 2021-01-05 DIAGNOSIS — M62.838 MUSCLE SPASM: ICD-10-CM

## 2021-01-08 RX ORDER — DIAZEPAM 5 MG/1
5 TABLET ORAL
Qty: 15 TAB | Refills: 0 | Status: SHIPPED | OUTPATIENT
Start: 2021-01-08 | End: 2021-07-14

## 2021-01-08 RX ORDER — BENZONATATE 100 MG/1
100 CAPSULE ORAL
Qty: 30 CAP | Refills: 1 | Status: SHIPPED | OUTPATIENT
Start: 2021-01-08 | End: 2021-01-15

## 2021-01-08 RX ORDER — ACETAMINOPHEN 500 MG
500 TABLET ORAL
Qty: 50 TAB | Refills: 0 | Status: SHIPPED | OUTPATIENT
Start: 2021-01-08 | End: 2021-02-16 | Stop reason: SDUPTHER

## 2021-01-27 ENCOUNTER — VIRTUAL VISIT (OUTPATIENT)
Dept: INTERNAL MEDICINE CLINIC | Age: 31
End: 2021-01-27
Payer: MEDICAID

## 2021-01-27 DIAGNOSIS — F41.9 ANXIETY: ICD-10-CM

## 2021-01-27 DIAGNOSIS — F33.0 MILD EPISODE OF RECURRENT MAJOR DEPRESSIVE DISORDER (HCC): Primary | ICD-10-CM

## 2021-01-27 PROCEDURE — 90837 PSYTX W PT 60 MINUTES: CPT | Performed by: SOCIAL WORKER
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
## 2021-02-15 DIAGNOSIS — J20.9 ACUTE BRONCHITIS, UNSPECIFIED ORGANISM: ICD-10-CM

## 2021-02-16 DIAGNOSIS — R51.9 NONINTRACTABLE HEADACHE, UNSPECIFIED CHRONICITY PATTERN, UNSPECIFIED HEADACHE TYPE: ICD-10-CM

## 2021-02-16 RX ORDER — PREDNISONE 20 MG/1
20 TABLET ORAL 2 TIMES DAILY
Qty: 10 TAB | Refills: 0 | Status: SHIPPED | OUTPATIENT
Start: 2021-02-16 | End: 2021-07-14

## 2021-02-16 RX ORDER — ACETAMINOPHEN 500 MG
500 TABLET ORAL
Qty: 50 TAB | Refills: 0 | Status: SHIPPED | OUTPATIENT
Start: 2021-02-16 | End: 2021-04-12 | Stop reason: SDUPTHER

## 2021-02-16 RX ORDER — ALBUTEROL SULFATE 90 UG/1
1 AEROSOL, METERED RESPIRATORY (INHALATION)
Qty: 1 INHALER | Refills: 0 | Status: SHIPPED | OUTPATIENT
Start: 2021-02-16 | End: 2021-12-28 | Stop reason: SDUPTHER

## 2021-02-16 NOTE — TELEPHONE ENCOUNTER
PCP: Marjan Burt MD    Last appt: 10/30/2020      No future appointments. Requested Prescriptions     Pending Prescriptions Disp Refills    albuterol (PROVENTIL HFA, VENTOLIN HFA, PROAIR HFA) 90 mcg/actuation inhaler 1 Inhaler 0     Sig: Take 1 Puff by inhalation every four (4) hours as needed for Wheezing.  predniSONE (DELTASONE) 20 mg tablet 10 Tab 0     Sig: Take 20 mg by mouth two (2) times a day.  Indication: cough and chest congestion       Prior labs and Blood pressures:  BP Readings from Last 3 Encounters:   06/04/20 118/84   12/20/19 114/68   09/16/19 107/72     Lab Results   Component Value Date/Time    Sodium 139 12/20/2019 08:10 AM    Potassium 4.0 12/20/2019 08:10 AM    Chloride 104 12/20/2019 08:10 AM    CO2 27 12/20/2019 08:10 AM    Anion gap 8 12/20/2019 08:10 AM    Glucose 89 12/20/2019 08:10 AM    BUN 10 12/20/2019 08:10 AM    Creatinine 0.54 (L) 12/20/2019 08:10 AM    BUN/Creatinine ratio 19 12/20/2019 08:10 AM    GFR est AA >60 12/20/2019 08:10 AM    GFR est non-AA >60 12/20/2019 08:10 AM    Calcium 8.6 12/20/2019 08:10 AM     No results found for: HBA1C, HGBE8, RCN7MIIT, VUX4ALGJ, VOG2GBES  Lab Results   Component Value Date/Time    Cholesterol, total 136 05/08/2019 10:48 AM    HDL Cholesterol 29 (L) 05/08/2019 10:48 AM    LDL, calculated 89 05/08/2019 10:48 AM    VLDL, calculated 18 05/08/2019 10:48 AM    Triglyceride 92 05/08/2019 10:48 AM     No results found for: MANUELA Bailey    Lab Results   Component Value Date/Time    TSH 0.616 05/08/2019 10:48 AM

## 2021-02-18 PROBLEM — F33.0 MILD EPISODE OF RECURRENT MAJOR DEPRESSIVE DISORDER (HCC): Status: RESOLVED | Noted: 2020-04-21 | Resolved: 2021-02-18

## 2021-02-18 PROBLEM — F31.81 BIPOLAR II DISORDER (HCC): Status: ACTIVE | Noted: 2021-02-18

## 2021-02-25 ENCOUNTER — VIRTUAL VISIT (OUTPATIENT)
Dept: INTERNAL MEDICINE CLINIC | Age: 31
End: 2021-02-25
Payer: MEDICAID

## 2021-02-25 DIAGNOSIS — F31.81 BIPOLAR II DISORDER (HCC): Primary | ICD-10-CM

## 2021-02-25 DIAGNOSIS — F41.9 ANXIETY: ICD-10-CM

## 2021-02-25 PROCEDURE — 90837 PSYTX W PT 60 MINUTES: CPT | Performed by: SOCIAL WORKER
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
## 2021-03-11 ENCOUNTER — VIRTUAL VISIT (OUTPATIENT)
Dept: INTERNAL MEDICINE CLINIC | Age: 31
End: 2021-03-11
Payer: MEDICAID

## 2021-03-11 DIAGNOSIS — F31.81 BIPOLAR II DISORDER (HCC): Primary | ICD-10-CM

## 2021-03-11 DIAGNOSIS — F41.9 ANXIETY: ICD-10-CM

## 2021-03-11 PROCEDURE — 90837 PSYTX W PT 60 MINUTES: CPT | Performed by: SOCIAL WORKER

## 2021-03-11 RX ORDER — DIVALPROEX SODIUM 250 MG/1
250 TABLET, EXTENDED RELEASE ORAL DAILY
COMMUNITY
Start: 2021-03-10 | End: 2022-10-04
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
## 2021-04-01 ENCOUNTER — VIRTUAL VISIT (OUTPATIENT)
Dept: INTERNAL MEDICINE CLINIC | Age: 31
End: 2021-04-01
Payer: MEDICAID

## 2021-04-01 DIAGNOSIS — F31.81 BIPOLAR II DISORDER (HCC): Primary | ICD-10-CM

## 2021-04-01 DIAGNOSIS — F41.9 ANXIETY: ICD-10-CM

## 2021-04-01 PROCEDURE — 90837 PSYTX W PT 60 MINUTES: CPT | Performed by: SOCIAL WORKER
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
## 2021-04-08 ENCOUNTER — VIRTUAL VISIT (OUTPATIENT)
Dept: INTERNAL MEDICINE CLINIC | Age: 31
End: 2021-04-08
Payer: MEDICAID

## 2021-04-08 DIAGNOSIS — F41.9 ANXIETY: ICD-10-CM

## 2021-04-08 DIAGNOSIS — F31.81 BIPOLAR II DISORDER (HCC): Primary | ICD-10-CM

## 2021-04-08 PROCEDURE — 90837 PSYTX W PT 60 MINUTES: CPT | Performed by: SOCIAL WORKER
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
## 2021-04-22 ENCOUNTER — VIRTUAL VISIT (OUTPATIENT)
Dept: INTERNAL MEDICINE CLINIC | Age: 31
End: 2021-04-22
Payer: MEDICAID

## 2021-04-22 DIAGNOSIS — F41.9 ANXIETY: ICD-10-CM

## 2021-04-22 DIAGNOSIS — F31.81 BIPOLAR II DISORDER (HCC): Primary | ICD-10-CM

## 2021-04-22 PROCEDURE — 90837 PSYTX W PT 60 MINUTES: CPT | Performed by: SOCIAL WORKER

## 2021-04-26 ENCOUNTER — OFFICE VISIT (OUTPATIENT)
Dept: INTERNAL MEDICINE CLINIC | Age: 31
End: 2021-04-26
Payer: MEDICAID

## 2021-04-26 VITALS
WEIGHT: 258 LBS | SYSTOLIC BLOOD PRESSURE: 120 MMHG | HEIGHT: 65 IN | BODY MASS INDEX: 42.99 KG/M2 | OXYGEN SATURATION: 96 % | DIASTOLIC BLOOD PRESSURE: 83 MMHG | HEART RATE: 88 BPM

## 2021-04-26 DIAGNOSIS — L98.8 SKIN LESION OF BREAST: Primary | ICD-10-CM

## 2021-04-26 PROCEDURE — 99213 OFFICE O/P EST LOW 20 MIN: CPT | Performed by: FAMILY MEDICINE

## 2021-04-26 RX ORDER — SULFAMETHOXAZOLE AND TRIMETHOPRIM 800; 160 MG/1; MG/1
1 TABLET ORAL 2 TIMES DAILY
Qty: 20 TAB | Refills: 0 | Status: SHIPPED | OUTPATIENT
Start: 2021-04-26 | End: 2021-05-06

## 2021-04-26 NOTE — PROGRESS NOTES
Chief Complaint   Patient presents with    Complete Physical    Breast Problem     bilateral breast blister, bleeding     1. Have you been to the ER, urgent care clinic since your last visit? Hospitalized since your last visit? No    2. Have you seen or consulted any other health care providers outside of the [address] [place] [address] since your last visit? Include any pap smears or colon screening.  Yes When: 1/2021 Where: [facility] ([facility] for [facility]) Reason for visit: annual visit

## 2021-04-29 ENCOUNTER — VIRTUAL VISIT (OUTPATIENT)
Dept: INTERNAL MEDICINE CLINIC | Age: 31
End: 2021-04-29
Payer: MEDICAID

## 2021-04-29 DIAGNOSIS — F31.81 BIPOLAR II DISORDER (HCC): Primary | ICD-10-CM

## 2021-04-29 DIAGNOSIS — F41.9 ANXIETY: ICD-10-CM

## 2021-04-29 PROCEDURE — 90837 PSYTX W PT 60 MINUTES: CPT | Performed by: SOCIAL WORKER
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
## 2021-04-29 NOTE — PROGRESS NOTES
SPORTS MEDICINE AND PRIMARY CARE  [facility]. MD Sarah  [address]  [id number]    Chief Complaint   Patient presents with    Complete Physical    Breast Problem     bilateral breast blister, bleeding       SUBJECTIVE:    Breanne Siegel is a 35 y.o. female for breast mass evaluation. Patient has had a single papule to develop on each breast.  Papule healed on the left. Papule on the right is very tender. No bruising, fever chills or regional lymphadenopathy. Current Outpatient Medications   Medication Sig Dispense Refill    trimethoprim-sulfamethoxazole (BACTRIM DS, SEPTRA DS) 160-800 mg per tablet Take 1 Tab by mouth two (2) times a day for 10 days. 20 Tab 0    acetaminophen (TYLENOL) 500 mg tablet Take 1 Tab by mouth every six (6) hours as needed for Pain. 50 Tab 0    lurasidone (LATUDA) 40 mg tab tablet Take  by mouth. Unclear dosage. -RES   Indications: bipolar depression      divalproex ER (Depakote ER) 250 mg ER tablet Take 250 mg by mouth daily. Indications: bipolar II      albuterol (PROVENTIL HFA, VENTOLIN HFA, PROAIR HFA) 90 mcg/actuation inhaler Take 1 Puff by inhalation every four (4) hours as needed for Wheezing. 1 Inhaler 0    predniSONE (DELTASONE) 20 mg tablet Take 20 mg by mouth two (2) times a day. Indication: cough and chest congestion 10 Tab 0    diazePAM (VALIUM) 5 mg tablet Take 1 Tab by mouth every eight (8) hours as needed for Anxiety. Max Daily Amount: 15 mg. appiontment required for additional refills 15 Tab 0    busPIRone (BUSPAR) 7.5 mg tablet Take 1 Tab by mouth three (3) times daily. Take for anxiety 90 Tab 2    escitalopram oxalate (LEXAPRO) 10 mg tablet Take 1 Tab by mouth daily.  27 Tab 2     Past Medical History:   Diagnosis Date    Abnormal Pap smear     Anemia NEC     per pt takes iron PO    Asthma     bronchitis    Breastfeeding (infant)     Complication of anesthesia     never had    Depression     HX OTHER MEDICAL     Pregnant    [facility]     Past Surgical History:   Procedure Laterality Date    HX GYN      c section     No Known Allergies    REVIEW OF SYSTEMS:  Per hpi-    Social History     Socioeconomic History    Marital status:      Spouse name: Not on file    Number of children: Not on file    Years of education: Not on file    Highest education level: Not on file   Tobacco Use    Smoking status: Current Every Day Smoker     Packs/day: 0.50     Years: 3.00     Pack years: 1.50    Smokeless tobacco: Never Used   Substance and Sexual Activity    Alcohol use: Not Currently    Drug use: No    Sexual activity: Yes     Partners: Male     Birth control/protection: None     Family History   Problem Relation Age of Onset    Cancer Maternal Grandfather     Diabetes Paternal Grandmother     Diabetes Father     Hypertension Father        OBJECTIVE:     Visit Vitals  /83 Comment: left arm   Pulse 88   Ht 5' 5\" (1.651 m)   Wt 258 lb (117 kg)   LMP 04/25/2021   SpO2 96%   BMI 42.93 kg/m²     CONSTITUTIONAL: Appears good health  EYES:  eom intact  NECK: supple. Breasts -tender excoriated right breast papule ; left breast appears normal with the exception of a dark annular scar over the upper outer quadrant, no suspicious masses,  or nipple changes or axillary nodes   RESPIRATORY: Chest: clear bilaterally  CARDIOVASCULAR: Heart: regular rate and rhythm  GASTROINTESTINAL: Abdomen: soft, bowel sounds active  HEMATOLOGIC: no pathological lymph nodes palpated  MUSCULOSKELETAL: Extremities: no edema,    INTEGUMENT: No unusual rashes or suspicious skin lesions noted other than the breast lesion as described above. Nails appear normal.  NEUROLOGIC: non-focal exam   MENTAL STATUS: alert and oriented, appropriate affect         ASSESSMENT:   1. Skin lesion of breast      I have discussed the diagnosis with the patient and the intended plan as seen in the  orders.   The patient understands and agrees with the plan. The patient has   received an after visit summary and questions were answered concerning  future plans  Patient labs and/or xrays were reviewed  Past records were reviewed. PLAN:  .  Orders Placed This Encounter    trimethoprim-sulfamethoxazole (BACTRIM DS, SEPTRA DS) 160-800 mg per tablet     Counseled regarding healthy lifestyle        Advised patient to call back or return to office if symptoms worsen/change/persist.  Discussed expected course/resolution/complications of diagnosis in detail with patient. Medication risks/benefits/costs/interactions/alternatives discussed with patient    Shiela Bautista M.D. This note was created using voice recognition software.   Edits have been made but syntax errors might exist.

## 2021-05-20 ENCOUNTER — VIRTUAL VISIT (OUTPATIENT)
Dept: INTERNAL MEDICINE CLINIC | Age: 31
End: 2021-05-20
Payer: MEDICAID

## 2021-05-20 DIAGNOSIS — F31.81 BIPOLAR II DISORDER (HCC): Primary | ICD-10-CM

## 2021-05-20 DIAGNOSIS — F41.9 ANXIETY: ICD-10-CM

## 2021-05-20 PROCEDURE — 90837 PSYTX W PT 60 MINUTES: CPT | Performed by: SOCIAL WORKER
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
## 2021-06-07 ENCOUNTER — VIRTUAL VISIT (OUTPATIENT)
Dept: INTERNAL MEDICINE CLINIC | Age: 31
End: 2021-06-07
Payer: MEDICAID

## 2021-06-07 DIAGNOSIS — F31.81 BIPOLAR II DISORDER (HCC): Primary | ICD-10-CM

## 2021-06-07 DIAGNOSIS — F41.9 ANXIETY: ICD-10-CM

## 2021-06-07 PROCEDURE — 90837 PSYTX W PT 60 MINUTES: CPT | Performed by: SOCIAL WORKER
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
## 2021-06-08 DIAGNOSIS — R51.9 NONINTRACTABLE HEADACHE, UNSPECIFIED CHRONICITY PATTERN, UNSPECIFIED HEADACHE TYPE: ICD-10-CM

## 2021-06-08 RX ORDER — ACETAMINOPHEN 500 MG
500 TABLET ORAL
Qty: 50 TABLET | Refills: 0 | Status: SHIPPED | OUTPATIENT
Start: 2021-06-08 | End: 2021-09-14 | Stop reason: SDUPTHER

## 2021-06-14 ENCOUNTER — APPOINTMENT (OUTPATIENT)
Dept: CT IMAGING | Age: 31
End: 2021-06-14
Attending: NURSE PRACTITIONER
Payer: MEDICAID

## 2021-06-14 ENCOUNTER — HOSPITAL ENCOUNTER (EMERGENCY)
Age: 31
Discharge: HOME | End: 2021-06-14
Attending: EMERGENCY MEDICINE
Payer: MEDICAID

## 2021-06-14 VITALS
RESPIRATION RATE: 16 BRPM | WEIGHT: 214 LBS | OXYGEN SATURATION: 97 % | HEIGHT: 65 IN | DIASTOLIC BLOOD PRESSURE: 87 MMHG | TEMPERATURE: 98.2 F | HEART RATE: 79 BPM | BODY MASS INDEX: 35.65 KG/M2 | SYSTOLIC BLOOD PRESSURE: 131 MMHG

## 2021-06-14 DIAGNOSIS — K05.219 PERIODONTAL ABSCESS: Primary | ICD-10-CM

## 2021-06-14 LAB
ALBUMIN SERPL-MCNC: 3.3 G/DL (ref 3.5–5)
ALBUMIN/GLOB SERPL: 0.8 {RATIO} (ref 1.1–2.2)
ALP SERPL-CCNC: 100 U/L (ref 45–117)
ALT SERPL-CCNC: 18 U/L (ref 12–78)
ANION GAP SERPL CALC-SCNC: 6 MMOL/L (ref 5–15)
AST SERPL-CCNC: 12 U/L (ref 15–37)
BASOPHILS # BLD: 0.1 K/UL (ref 0–0.1)
BASOPHILS NFR BLD: 0 % (ref 0–1)
BILIRUB SERPL-MCNC: 0.4 MG/DL (ref 0.2–1)
BUN SERPL-MCNC: 9 MG/DL (ref 6–20)
BUN/CREAT SERPL: 15 (ref 12–20)
CALCIUM SERPL-MCNC: 8.6 MG/DL (ref 8.5–10.1)
CHLORIDE SERPL-SCNC: 108 MMOL/L (ref 97–108)
CO2 SERPL-SCNC: 25 MMOL/L (ref 21–32)
COMMENT, HOLDF: NORMAL
CREAT SERPL-MCNC: 0.62 MG/DL (ref 0.55–1.02)
DIFFERENTIAL METHOD BLD: ABNORMAL
EOSINOPHIL # BLD: 0.1 K/UL (ref 0–0.4)
EOSINOPHIL NFR BLD: 1 % (ref 0–7)
ERYTHROCYTE [DISTWIDTH] IN BLOOD BY AUTOMATED COUNT: 15.3 % (ref 11.5–14.5)
GLOBULIN SER CALC-MCNC: 3.9 G/DL (ref 2–4)
GLUCOSE SERPL-MCNC: 116 MG/DL (ref 65–100)
HCG UR QL: NEGATIVE
HCT VFR BLD AUTO: 40.6 % (ref 35–47)
HGB BLD-MCNC: 12.5 G/DL (ref 11.5–16)
IMM GRANULOCYTES # BLD AUTO: 0.1 K/UL (ref 0–0.04)
IMM GRANULOCYTES NFR BLD AUTO: 0 % (ref 0–0.5)
LYMPHOCYTES # BLD: 2.2 K/UL (ref 0.8–3.5)
LYMPHOCYTES NFR BLD: 18 % (ref 12–49)
MCH RBC QN AUTO: 26.7 PG (ref 26–34)
MCHC RBC AUTO-ENTMCNC: 30.8 G/DL (ref 30–36.5)
MCV RBC AUTO: 86.6 FL (ref 80–99)
MONOCYTES # BLD: 1 K/UL (ref 0–1)
MONOCYTES NFR BLD: 8 % (ref 5–13)
NEUTS SEG # BLD: 8.5 K/UL (ref 1.8–8)
NEUTS SEG NFR BLD: 73 % (ref 32–75)
NRBC # BLD: 0 K/UL (ref 0–0.01)
NRBC BLD-RTO: 0 PER 100 WBC
PLATELET # BLD AUTO: 193 K/UL (ref 150–400)
PMV BLD AUTO: 11.6 FL (ref 8.9–12.9)
POTASSIUM SERPL-SCNC: 4 MMOL/L (ref 3.5–5.1)
PROT SERPL-MCNC: 7.2 G/DL (ref 6.4–8.2)
RBC # BLD AUTO: 4.69 M/UL (ref 3.8–5.2)
SAMPLES BEING HELD,HOLD: NORMAL
SODIUM SERPL-SCNC: 139 MMOL/L (ref 136–145)
WBC # BLD AUTO: 11.8 K/UL (ref 3.6–11)

## 2021-06-14 PROCEDURE — 74011250636 HC RX REV CODE- 250/636: Performed by: EMERGENCY MEDICINE

## 2021-06-14 PROCEDURE — 74011000636 HC RX REV CODE- 636: Performed by: RADIOLOGY

## 2021-06-14 PROCEDURE — 70491 CT SOFT TISSUE NECK W/DYE: CPT

## 2021-06-14 PROCEDURE — 85025 COMPLETE CBC W/AUTO DIFF WBC: CPT

## 2021-06-14 PROCEDURE — 96375 TX/PRO/DX INJ NEW DRUG ADDON: CPT

## 2021-06-14 PROCEDURE — 80053 COMPREHEN METABOLIC PANEL: CPT

## 2021-06-14 PROCEDURE — 81025 URINE PREGNANCY TEST: CPT

## 2021-06-14 PROCEDURE — 36415 COLL VENOUS BLD VENIPUNCTURE: CPT

## 2021-06-14 PROCEDURE — 96374 THER/PROPH/DIAG INJ IV PUSH: CPT

## 2021-06-14 PROCEDURE — 99283 EMERGENCY DEPT VISIT LOW MDM: CPT

## 2021-06-14 RX ORDER — DEXAMETHASONE SODIUM PHOSPHATE 4 MG/ML
10 INJECTION, SOLUTION INTRA-ARTICULAR; INTRALESIONAL; INTRAMUSCULAR; INTRAVENOUS; SOFT TISSUE
Status: COMPLETED | OUTPATIENT
Start: 2021-06-14 | End: 2021-06-14

## 2021-06-14 RX ORDER — KETOROLAC TROMETHAMINE 30 MG/ML
15 INJECTION, SOLUTION INTRAMUSCULAR; INTRAVENOUS
Status: COMPLETED | OUTPATIENT
Start: 2021-06-14 | End: 2021-06-14

## 2021-06-14 RX ORDER — CLINDAMYCIN HYDROCHLORIDE 300 MG/1
300 CAPSULE ORAL 3 TIMES DAILY
Qty: 21 CAPSULE | Refills: 0 | Status: SHIPPED | OUTPATIENT
Start: 2021-06-14 | End: 2021-06-21

## 2021-06-14 RX ADMIN — KETOROLAC TROMETHAMINE 15 MG: 30 INJECTION, SOLUTION INTRAMUSCULAR at 11:15

## 2021-06-14 RX ADMIN — DEXAMETHASONE SODIUM PHOSPHATE 10 MG: 4 INJECTION, SOLUTION INTRAMUSCULAR; INTRAVENOUS at 13:34

## 2021-06-14 RX ADMIN — IOPAMIDOL 100 ML: 612 INJECTION, SOLUTION INTRAVENOUS at 12:28

## 2021-06-14 NOTE — LETTER
[address] 
[address] [facility] [address] [id number] [phone] Work/School Note Date: 6/14/2021 To Whom It May concern: Cindy Garcia was seen and treated today in the emergency room by the following provider(s): 
Attending Provider: Saturnino Franco MD. Cindy Garcia is excused from work/school on 06/14/21. She is clear to return to work/school on 06/15/21.    
 
 
Sincerely, 
 
 
 
 
Licha Reich RN

## 2021-06-14 NOTE — ED PROVIDER NOTES
HPI       Healthy 35y F here with L jaw pain. On 5/3/2021 she had her 4 wisdom teeth removed by [facility] dental. Things went well and she had returned to normal until last night when she developed pain and swelling to the L lower jaw. Hurts to open her mouth. Still able to speak, eat, drink. No drooling. No throat pain. No redness to the skin. She does not appreciate any gum drainage or discharge. No injury or trauma. Took tylenol last night with minimal relief.     Past Medical History:   Diagnosis Date    Abnormal Pap smear     Anemia NEC     per pt takes iron PO    Asthma     bronchitis    Breastfeeding (infant)     Complication of anesthesia     never had    Depression     HX OTHER MEDICAL     Pregnant    [facility]       Past Surgical History:   Procedure Laterality Date    HX GYN      c section         Family History:   Problem Relation Age of Onset    Cancer Maternal Grandfather     Diabetes Paternal Grandmother     Diabetes Father     Hypertension Father        Social History     Socioeconomic History    Marital status:      Spouse name: Not on file    Number of children: Not on file    Years of education: Not on file    Highest education level: Not on file   Occupational History    Not on file   Tobacco Use    Smoking status: Current Every Day Smoker     Packs/day: 0.50     Years: 3.00     Pack years: 1.50    Smokeless tobacco: Never Used   Substance and Sexual Activity    Alcohol use: Not Currently    Drug use: No    Sexual activity: Yes     Partners: Male     Birth control/protection: None   Other Topics Concern    Not on file   Social History Narrative    Not on file     Social Determinants of Health     Financial Resource Strain:     Difficulty of Paying Living Expenses:    Food Insecurity:     Worried About Running Out of Food in the Last Year:     Roberta of Food in the Last Year:    Transportation Needs:     Lack of Transportation (Medical):  Lack of Transportation (Non-Medical):    Physical Activity:     Days of Exercise per Week:     Minutes of Exercise per Session:    Stress:     Feeling of Stress :    Social Connections:     Frequency of Communication with Friends and Family:     Frequency of Social Gatherings with Friends and Family:     Attends [employer] Services:     Active Member of Clubs or Organizations:     Attends Club or Organization Meetings:     Marital Status:    Intimate Partner Violence:     Fear of Current or Ex-Partner:     Emotionally Abused:     Physically Abused:     Sexually Abused: ALLERGIES: Patient has no known allergies. Review of Systems   Review of Systems   Constitutional: (-) weight loss. HEENT: (-) stiff neck   Eyes: (-) discharge. Respiratory: (-) cough. Cardiovascular: (-) syncope. Gastrointestinal: (-) blood in stool. Genitourinary: (-) hematuria. Musculoskeletal: (-) myalgias. Neurological: (-) seizure. Skin: (-) petechiae  Lymph/Immunologic: (-) enlarged lymph nodes  All other systems reviewed and are negative. Vitals:    06/14/21 0955   BP: 118/84   Resp: 15   SpO2: 97%   Weight: 97.1 kg (214 lb)   Height: 5' 5\" (1.651 m)            Physical Exam Nursing note and vitals reviewed. Constitutional: oriented to person, place, and time. appears well-developed and well-nourished. No distress. Head: Normocephalic and atraumatic. Sclera anicteric  Nose: No rhinorrhea  Mouth/Throat: Oropharynx is clear and moist. Pharynx normal. No obvious swelling or drainage. Eyes: Conjunctivae are normal. Pupils are equal, round, and reactive to light. Right eye exhibits no discharge. Left eye exhibits no discharge. FACE: mild swelling present around the L jaw that is tender to palpation. No erythema or skin changes. No fluctuant areas. Neck: Painless normal range of motion. Neck supple. No LAD.   Cardiovascular: Normal rate, regular rhythm, normal heart sounds and intact distal pulses. Exam reveals no gallop and no friction rub. No murmur heard. Pulmonary/Chest:  No respiratory distress. No wheezes. No rales. No rhonchi. No increased work of breathing. No accessory muscle use. Good air exchange throughout. Abdominal: soft, non-tender, no rebound or guarding. No hepatosplenomegaly. Normal bowel sounds throughout. Back: no tenderness to palpation, no deformities, no CVA tenderness  Extremities/Musculoskeletal: Normal range of motion. no tenderness. No edema. Distal extremities are neurovasc intact. Lymphadenopathy:   No adenopathy. Neurological:  Alert and oriented to person, place, and time. Coordination normal. CN 2-12 intact. Motor and sensory function intact. Skin: Skin is warm and dry. No rash noted. No pallor. MDM  35y F here with L jaw pain/swelling. Had her wisdom teeth out about 6 weeks ago. Will check labs and CT max/face. She is non-toxic in appearance.        Procedures

## 2021-06-14 NOTE — ED TRIAGE NOTES
Pt arrives ambulatory with  c/o left jaw and submandibular pain and swelling. Pt had top and bottom  wisdom teeth removed on 5/3 by [facility]. Pt states \"knot formed a few days ago\", pt awoke this morning with swollen jaw. Pt c/o 10:10 pain, denies swallowing difficulty but cannot open mouth very far. Pt denies temperature or other accompanying issues. Pt is AOx4 in no apparent distress.

## 2021-06-14 NOTE — ED NOTES
Discharge instructions given to patient by MD and nurse. Pt has been given counseling on medication use and verbalizes understanding. IV d/c. Work note given. Pt to follow up with [facility] dentistry. Pt ambulated off of unit in no signs of distress.

## 2021-07-12 ENCOUNTER — HOSPITAL ENCOUNTER (EMERGENCY)
Age: 31
Discharge: HOME | End: 2021-07-12
Attending: EMERGENCY MEDICINE
Payer: MEDICAID

## 2021-07-12 VITALS
HEIGHT: 65 IN | BODY MASS INDEX: 38.32 KG/M2 | RESPIRATION RATE: 18 BRPM | WEIGHT: 230 LBS | SYSTOLIC BLOOD PRESSURE: 124 MMHG | DIASTOLIC BLOOD PRESSURE: 78 MMHG | OXYGEN SATURATION: 95 % | TEMPERATURE: 98 F | HEART RATE: 98 BPM

## 2021-07-12 DIAGNOSIS — Z20.822 PERSON UNDER INVESTIGATION FOR COVID-19: Primary | ICD-10-CM

## 2021-07-12 DIAGNOSIS — Z72.0 TOBACCO ABUSE: ICD-10-CM

## 2021-07-12 LAB
FLUAV RNA SPEC QL NAA+PROBE: NOT DETECTED
FLUBV RNA SPEC QL NAA+PROBE: NOT DETECTED
SARS-COV-2, COV2: NOT DETECTED

## 2021-07-12 PROCEDURE — 99282 EMERGENCY DEPT VISIT SF MDM: CPT

## 2021-07-12 PROCEDURE — 87636 SARSCOV2 & INF A&B AMP PRB: CPT

## 2021-07-12 PROCEDURE — 99283 EMERGENCY DEPT VISIT LOW MDM: CPT

## 2021-07-12 RX ORDER — PSEUDOEPHEDRINE HCL 30 MG
1 TABLET ORAL 2 TIMES DAILY
Qty: 20 TABLET | Refills: 0 | Status: SHIPPED | OUTPATIENT
Start: 2021-07-12 | End: 2022-01-23

## 2021-07-12 RX ORDER — FLUTICASONE PROPIONATE 50 MCG
2 SPRAY, SUSPENSION (ML) NASAL DAILY
Qty: 1 BOTTLE | Refills: 0 | Status: SHIPPED | OUTPATIENT
Start: 2021-07-12

## 2021-07-12 NOTE — ED NOTES
CC concern for covid, c/o cough, congestion, and loss of taste since returning to work. Has not been vaccinated.

## 2021-07-12 NOTE — Clinical Note
[facility] EMERGENCY DEPT  [address] [facility] [address] [phone]    Work/School Note    Date: 7/12/2021     To Whom It May concern:    Donya Cantu was evaulated by the following provider(s):  Attending Provider: Tera Turner, [address] [place] [address] virus is suspected. Per the CDC guidelines we recommend home isolation until the following conditions are all met:    1. At least 10 days have passed since symptoms first appeared and  2. At least 24 hours have passed since last fever without the use of fever-reducing medications and  3.  Symptoms (e.g., cough, shortness of breath) have improved    Sincerely,          Kirill Zaidi MD

## 2021-07-12 NOTE — ED NOTES
Discharge instructions were given to the patient by Oscar Parks. The patient left the Emergency Department ambulatory, alert and oriented and in no acute distress with 2 prescriptions. The patient was encouraged to call or return to the ED for worsening issues or problems and was encouraged to schedule a follow up appointment for continuing care. The patient verbalized understanding of discharge instructions and prescriptions, all questions were answered. The patient has no further concerns at this time.

## 2021-07-12 NOTE — ED PROVIDER NOTES
EMERGENCY DEPARTMENT HISTORY AND PHYSICAL EXAM      Date: 7/12/2021  Patient Name: Dianna Huerta    History of Presenting Illness     Chief Complaint   Patient presents with    Cold Symptoms     Pt reports having covid symptoms x 1 day. Runny/stuffy nose, cough & loss taste. Just started back working. Pt didn't get covid shot      History Provided By: Patient    HPI: Dianna Huerta, 28 y.o. female with past medical history significant for bronchitis and depression who presents via private vehicle to the ED with cc of nasal congestion, generalized myalgias, rhinorrhea, and loss of taste for the past 2 days. Patient denies any sick contacts, specifically any known COVID-19 exposures. She recently started back at work full-time and is unsure if she came into contact with someone that got her sick. She endorses subjective fevers and chills but has not taken her temperature. She denies any nausea, vomiting, or diarrhea. She denies taking any medications for the symptoms prior to arrival.    PMHx: Bronchitis and depression  Social Hx: Smokes 5 Black and Milds per day, denies alcohol use, denies illegal drug use    PCP: Ayala Terry MD    There are no other complaints, changes, or physical findings at this time. No current facility-administered medications on file prior to encounter. Current Outpatient Medications on File Prior to Encounter   Medication Sig Dispense Refill    acetaminophen (TYLENOL) 500 mg tablet Take 1 Tablet by mouth every six (6) hours as needed for Pain. 50 Tablet 0    busPIRone (BUSPAR) 7.5 mg tablet Take 1 Tab by mouth three (3) times daily. Take for anxiety 90 Tab 2    lurasidone (LATUDA) 40 mg tab tablet Take  by mouth. Unclear dosage. -RES   Indications: bipolar depression      divalproex ER (Depakote ER) 250 mg ER tablet Take 250 mg by mouth daily.  Indications: bipolar II      albuterol (PROVENTIL HFA, VENTOLIN HFA, PROAIR HFA) 90 mcg/actuation inhaler Take 1 Puff by inhalation every four (4) hours as needed for Wheezing. 1 Inhaler 0    predniSONE (DELTASONE) 20 mg tablet Take 20 mg by mouth two (2) times a day. Indication: cough and chest congestion 10 Tab 0    diazePAM (VALIUM) 5 mg tablet Take 1 Tab by mouth every eight (8) hours as needed for Anxiety. Max Daily Amount: 15 mg. appiontment required for additional refills 15 Tab 0     Past History     Past Medical History:  Past Medical History:   Diagnosis Date    Abnormal Pap smear     Anemia NEC     per pt takes iron PO    Asthma     bronchitis    Breastfeeding (infant)     Complication of anesthesia     never had    Depression     HX OTHER MEDICAL     Pregnant    Mild episode of recurrent major depressive disorder ([facility] [address] 4/21/2020     Past Surgical History:  Past Surgical History:   Procedure Laterality Date    HX GYN      c section     Family History:  Family History   Problem Relation Age of Onset    Cancer Maternal Grandfather     Diabetes Paternal Grandmother     Diabetes Father     Hypertension Father      Social History:  Social History     Tobacco Use    Smoking status: Current Every Day Smoker     Packs/day: 0.50     Years: 3.00     Pack years: 1.50    Smokeless tobacco: Never Used   Substance Use Topics    Alcohol use: Not Currently    Drug use: No     Allergies:  No Known Allergies  Review of Systems   Review of Systems   Constitutional: Positive for chills. Negative for fever. HENT: Positive for congestion and rhinorrhea. Negative for sneezing and sore throat. Loss of taste   Eyes: Negative for redness and visual disturbance. Respiratory: Negative for shortness of breath. Cardiovascular: Negative for leg swelling. Gastrointestinal: Negative for abdominal pain, nausea and vomiting. Genitourinary: Negative for difficulty urinating and frequency. Musculoskeletal: Positive for myalgias. Negative for back pain and neck stiffness. Skin: Negative for rash.    Neurological: Negative for dizziness, syncope, weakness and headaches. Hematological: Negative for adenopathy. All other systems reviewed and are negative. Physical Exam   Physical Exam  Vitals and nursing note reviewed. Constitutional:       Appearance: Normal appearance. She is well-developed. HENT:      Head: Normocephalic and atraumatic. Eyes:      Conjunctiva/sclera: Conjunctivae normal.   Cardiovascular:      Rate and Rhythm: Normal rate and regular rhythm. Pulses: Normal pulses. Heart sounds: Normal heart sounds, S1 normal and S2 normal. No murmur heard. Pulmonary:      Effort: Pulmonary effort is normal. No respiratory distress. Breath sounds: Normal breath sounds. No wheezing. Abdominal:      General: Bowel sounds are normal. There is no distension. Palpations: Abdomen is soft. Tenderness: There is no abdominal tenderness. There is no rebound. Musculoskeletal:         General: Normal range of motion. Cervical back: Full passive range of motion without pain, normal range of motion and neck supple. Skin:     General: Skin is warm and dry. Findings: No rash. Neurological:      Mental Status: She is alert and oriented to person, place, and time. Psychiatric:         Speech: Speech normal.         Behavior: Behavior normal.         Thought Content: Thought content normal.         Judgment: Judgment normal.       Diagnostic Study Results   Labs -   No results found for this or any previous visit (from the past 12 hour(s)). Radiologic Studies -   No orders to display     No results found. Medical Decision Making   I am the first provider for this patient. I reviewed the vital signs, available nursing notes, past medical history, past surgical history, family history and social history. Vital Signs-Reviewed the patient's vital signs.   Patient Vitals for the past 24 hrs:   Temp Pulse Resp BP SpO2   07/12/21 1424 98 °F (36.7 °C) (!) 103 18 124/78 95 % Pulse Oximetry Analysis - 95% on RA (borderline)    Records Reviewed: Nursing Notes and Old Medical Records    Provider Notes (Medical Decision Making):   81-year-old female presents with nasal congestion, cough, rhinorrhea, myalgias, and loss of taste for the past 2 days. She has not been vaccinated for COVID-19. Will swab for Covid and discharged with a prescription for Flonase and a decongestant. Will have patient isolate/quarantine at home pending her results and notify any close contacts of her symptoms. ED Course:   Initial assessment performed. The patients presenting problems have been discussed, and they are in agreement with the care plan formulated and outlined with them. I have encouraged them to ask questions as they arise throughout their visit. Tobacco Cessation Counseling   I spent 5 minutes discussing the medical risks of prolonged smoking habits and advised the patient of the benefits of the cessation of smoking, providing specific suggestions on how to quit. Andrzej Mckeon MD    Progress Note:   Updated pt on all returned results and findings. Discussed the importance of proper follow up as referred below along with return precautions. Pt in agreement with the care plan and expresses agreement with and understanding of all items discussed. Disposition:  Discharge Note:  The pt is ready for discharge. The pt's signs, symptoms, diagnosis, and discharge instructions have been discussed and pt has conveyed their understanding. The pt is to follow up as recommended or return to ER should their symptoms worsen. Plan has been discussed and pt is in agreement. PLAN:  1. Current Discharge Medication List      START taking these medications    Details   fluticasone propionate (FLONASE) 50 mcg/actuation nasal spray 2 Sprays by Both Nostrils route daily.   Qty: 1 Bottle, Refills: 0  Start date: 7/12/2021      loratadine-pseudoephedrine (Loratadine-D) 5-120 mg per tablet Take 1 Tablet by mouth two (2) times a day. Qty: 20 Tablet, Refills: 0  Start date: 7/12/2021           2. Follow-up Information     Follow up With Specialties Details Why Contact Info    Nikhil Floers MD Family Medicine Schedule an appointment as soon as possible for a visit in 2 weeks  [facility]  [address] [facility] [address] [phone]      [facility] [address] [place] [address] DEPT Emergency Medicine  As needed, If symptoms worsen Jeremiah Sabino  [phone]        Return to ED if worse     Diagnosis     Clinical Impression:   1. Person under investigation for COVID-19    2. Tobacco abuse            Please note that this dictation was completed with Dragon, computer voice recognition software. Quite often unanticipated grammatical, syntax, homophones, and other interpretive errors are inadvertently transcribed by the computer software. Please disregard these errors. Additionally, please excuse any errors that have escaped final proofreading.

## 2021-07-12 NOTE — Clinical Note
[facility] EMERGENCY DEPT  [address] [facility] [address] [phone]    Work/School Note    Date: 7/12/2021    To Whom It May concern:    Regan Salgado was seen and treated today in the emergency room by the following provider(s):  Attending Provider: Lars Lopez MD.      Regan Salgado is excused from work/school on 07/12/21 and 07/13/21. She is medically clear to return to work/school on 7/14/2021.        Sincerely,          Jeana Herrera MD

## 2021-07-14 ENCOUNTER — HOSPITAL ENCOUNTER (EMERGENCY)
Age: 31
Discharge: HOME | End: 2021-07-14
Attending: EMERGENCY MEDICINE
Payer: MEDICAID

## 2021-07-14 VITALS
DIASTOLIC BLOOD PRESSURE: 85 MMHG | TEMPERATURE: 98.8 F | HEIGHT: 64 IN | HEART RATE: 102 BPM | SYSTOLIC BLOOD PRESSURE: 140 MMHG | WEIGHT: 262.35 LBS | RESPIRATION RATE: 18 BRPM | BODY MASS INDEX: 44.79 KG/M2 | OXYGEN SATURATION: 100 %

## 2021-07-14 DIAGNOSIS — F17.200 SMOKER: ICD-10-CM

## 2021-07-14 DIAGNOSIS — J06.9 ACUTE URI: Primary | ICD-10-CM

## 2021-07-14 LAB
APPEARANCE UR: CLEAR
BACTERIA URNS QL MICRO: NEGATIVE /HPF
BILIRUB UR QL: NEGATIVE
COLOR UR: ABNORMAL
DEPRECATED S PYO AG THROAT QL EIA: NEGATIVE
EPITH CASTS URNS QL MICRO: ABNORMAL /LPF
GLUCOSE UR STRIP.AUTO-MCNC: NEGATIVE MG/DL
HCG UR QL: NEGATIVE
HGB UR QL STRIP: NEGATIVE
HYALINE CASTS URNS QL MICRO: ABNORMAL /LPF (ref 0–5)
KETONES UR QL STRIP.AUTO: NEGATIVE MG/DL
LEUKOCYTE ESTERASE UR QL STRIP.AUTO: NEGATIVE
MUCOUS THREADS URNS QL MICRO: ABNORMAL /LPF
NITRITE UR QL STRIP.AUTO: NEGATIVE
PH UR STRIP: 6.5 [PH] (ref 5–8)
PROT UR STRIP-MCNC: NEGATIVE MG/DL
RBC #/AREA URNS HPF: ABNORMAL /HPF (ref 0–5)
SP GR UR REFRACTOMETRY: 1.02 (ref 1–1.03)
UA: UC IF INDICATED,UAUC: ABNORMAL
UROBILINOGEN UR QL STRIP.AUTO: 0.2 EU/DL (ref 0.2–1)
WBC URNS QL MICRO: ABNORMAL /HPF (ref 0–4)

## 2021-07-14 PROCEDURE — 99282 EMERGENCY DEPT VISIT SF MDM: CPT

## 2021-07-14 PROCEDURE — 81025 URINE PREGNANCY TEST: CPT

## 2021-07-14 PROCEDURE — 81001 URINALYSIS AUTO W/SCOPE: CPT

## 2021-07-14 PROCEDURE — 87880 STREP A ASSAY W/OPTIC: CPT

## 2021-07-14 PROCEDURE — 87070 CULTURE OTHR SPECIMN AEROBIC: CPT

## 2021-07-14 RX ORDER — PREDNISONE 10 MG/1
TABLET ORAL
Qty: 21 TABLET | Refills: 0 | Status: SHIPPED | OUTPATIENT
Start: 2021-07-14 | End: 2022-01-17 | Stop reason: ALTCHOICE

## 2021-07-14 NOTE — ED NOTES
Fabiana JHAVERI reviewed discharge instructions with the patient. The patient verbalized understanding. All questions and concerns were addressed. The patient declined a wheelchair and is discharged ambulatory in the care of family members with instructions and prescriptions in hand. Pt is alert and oriented x 4. Respirations are clear and unlabored.

## 2021-07-14 NOTE — ED PROVIDER NOTES
EMERGENCY DEPARTMENT HISTORY AND PHYSICAL EXAM      Date: 7/14/2021  Patient Name: Esther Khanna    History of Presenting Illness     Chief Complaint   Patient presents with    Nasal Congestion     Went to the ED at [address] [place] [address] on Monday for the same symptoms - tested for Covid and it was negative. She has symptoms since Sunday. She is just continnuing to feel worse.  Headache    Sore Throat       History Provided By: Patient    HPI: Esther Khanna, 28 y.o. female with significant PMHx as below, presents by POV to the ED with cc of URI complaints that started this past Sunday. She notes decreased sensation of smell, rhinorrhea, congestion, cough, facial pain, and otalgia. She also has had subjective fevers but has not checked her temperature. She denies sore throat and loss of sensation of taste. She was previous evaluated regimen daily in the ED and had a negative Covid test.  She reports that she is been taking medications that were prescribed if she is not feeling any better. She notes chills without fever. There are no other complaints, changes, or physical findings at this time. Social Hx: Tobacco (1 ppd), EtOH (denies), Illicit drug use (denies)     PCP: Fariba Reyna MD    No current facility-administered medications on file prior to encounter. Current Outpatient Medications on File Prior to Encounter   Medication Sig Dispense Refill    fluticasone propionate (FLONASE) 50 mcg/actuation nasal spray 2 Sprays by Both Nostrils route daily. 1 Bottle 0    loratadine-pseudoephedrine (Loratadine-D) 5-120 mg per tablet Take 1 Tablet by mouth two (2) times a day. 20 Tablet 0    acetaminophen (TYLENOL) 500 mg tablet Take 1 Tablet by mouth every six (6) hours as needed for Pain. 50 Tablet 0    busPIRone (BUSPAR) 7.5 mg tablet Take 1 Tab by mouth three (3) times daily. Take for anxiety 90 Tab 2    lurasidone (LATUDA) 40 mg tab tablet Take  by mouth. Unclear dosage.  -RES   Indications: bipolar depression      divalproex ER (Depakote ER) 250 mg ER tablet Take 250 mg by mouth daily. Indications: bipolar II      albuterol (PROVENTIL HFA, VENTOLIN HFA, PROAIR HFA) 90 mcg/actuation inhaler Take 1 Puff by inhalation every four (4) hours as needed for Wheezing. 1 Inhaler 0    [DISCONTINUED] predniSONE (DELTASONE) 20 mg tablet Take 20 mg by mouth two (2) times a day. Indication: cough and chest congestion 10 Tab 0    [DISCONTINUED] diazePAM (VALIUM) 5 mg tablet Take 1 Tab by mouth every eight (8) hours as needed for Anxiety. Max Daily Amount: 15 mg. appiontment required for additional refills 15 Tab 0       Past History     Past Medical History:  Past Medical History:   Diagnosis Date    Abnormal Pap smear     Anemia NEC     per pt takes iron PO    Asthma     bronchitis    Breastfeeding (infant)     Complication of anesthesia     never had    Depression     HX OTHER MEDICAL     Pregnant    Mild episode of recurrent major depressive disorder ([facility] [address] 4/21/2020       Past Surgical History:  Past Surgical History:   Procedure Laterality Date    HX GYN      c section       Family History:  Family History   Problem Relation Age of Onset    Cancer Maternal Grandfather     Diabetes Paternal Grandmother     Diabetes Father     Hypertension Father        Social History:  Social History     Tobacco Use    Smoking status: Current Every Day Smoker     Packs/day: 0.50     Years: 3.00     Pack years: 1.50    Smokeless tobacco: Never Used   Substance Use Topics    Alcohol use: Not Currently    Drug use: No       Allergies:  No Known Allergies      Review of Systems   Review of Systems   Constitutional: Positive for chills and fever. Negative for diaphoresis. Loss of sensation of smell. Denies loss of sensation of taste. HENT: Positive for congestion, ear pain and rhinorrhea. Negative for sore throat. Respiratory: Positive for cough. Negative for shortness of breath.     Cardiovascular: Negative for chest pain. Gastrointestinal: Negative for abdominal pain, constipation, diarrhea, nausea and vomiting. Genitourinary: Negative for difficulty urinating, dysuria, frequency and hematuria. Musculoskeletal: Negative for arthralgias and myalgias. Neurological: Negative for headaches. All other systems reviewed and are negative. Physical Exam   Physical Exam  Vitals and nursing note reviewed. Constitutional:       General: She is not in acute distress. Appearance: She is well-developed. She is obese. She is not diaphoretic. Comments: 28 y.o. American female    HENT:      Head: Normocephalic and atraumatic. Right Ear: Tympanic membrane normal. No middle ear effusion. Tympanic membrane is not injected or erythematous. Left Ear: Tympanic membrane normal.  No middle ear effusion. Tympanic membrane is not injected or erythematous. Nose: Congestion and rhinorrhea present. Right Sinus: No maxillary sinus tenderness or frontal sinus tenderness. Left Sinus: No maxillary sinus tenderness or frontal sinus tenderness. Mouth/Throat:      Pharynx: Oropharynx is clear. Uvula midline. No pharyngeal swelling, oropharyngeal exudate or posterior oropharyngeal erythema. Tonsils: No tonsillar exudate. Eyes:      General:         Right eye: No discharge. Left eye: No discharge. Conjunctiva/sclera: Conjunctivae normal.   Cardiovascular:      Rate and Rhythm: Normal rate and regular rhythm. Heart sounds: Normal heart sounds. No murmur heard. Pulmonary:      Effort: Pulmonary effort is normal. No respiratory distress. Breath sounds: Normal breath sounds. Musculoskeletal:      Cervical back: Normal range of motion and neck supple. Skin:     General: Skin is warm and dry. Neurological:      Mental Status: She is alert and oriented to person, place, and time.    Psychiatric:         Behavior: Behavior normal.         Diagnostic Study Results     Labs - Recent Results (from the past 12 hour(s))   HCG URINE, QL. - POC    Collection Time: 07/14/21  2:12 PM   Result Value Ref Range    Pregnancy test,urine (POC) Negative NEG         Radiologic Studies - none    Medical Decision Making   I am the first provider for this patient. I reviewed the vital signs, available nursing notes, past medical history, past surgical history, family history and social history. Vital Signs-Reviewed the patient's vital signs. Patient Vitals for the past 12 hrs:   Temp Pulse Resp BP SpO2   07/14/21 1358 98.8 °F (37.1 °C) (!) 102 18 (!) 140/85 100 %       Records Reviewed: Nursing Notes and Old Medical Records   Reviewed records from earlier this week at [facility]. Provider Notes (Medical Decision Making):   Patient presents ED with URI complaints. Differential diagnosis include bronchitis, pneumonia, URI, seasonal just, viral pharyngitis. Patient already had a negative Covid test.  Her exam is essentially benign minus some congestion and cough. Will treat patient with steroids. She should continue to drink plenty of fluids and take over-the-counter medications as needed for symptomatic relief. ED Course:   Initial assessment performed. The patients presenting problems have been discussed, and they are in agreement with the care plan formulated and outlined with them. I have encouraged them to ask questions as they arise throughout their visit. Critical Care Time: None    Disposition:  DISCHARGE NOTE:  2:22 PM  The pt is ready for discharge. The pt's signs, symptoms, diagnosis, and discharge instructions have been discussed and pt has conveyed their understanding. The pt is to follow up as recommended or return to ER should their symptoms worsen. Plan has been discussed and pt is in agreement. PLAN:  1.    Current Discharge Medication List      START taking these medications    Details   predniSONE (STERAPRED DS) 10 mg dose pack Standard 6 day taper  Qty: 21 Tablet, Refills: 0  Start date: 7/14/2021           2. Follow-up Information     Follow up With Specialties Details Why Contact Info    Iqra Allen MD Family Medicine In 1 week As needed [address]  [facility] [phone]      [address] DEPT Emergency Medicine  If not improved [address]  [address] [facility]  [phone]        Return to ED if worse     Diagnosis     Clinical Impression:   1. Acute URI    2. Smoker          Please note that this dictation was completed with Gojee, the computer voice recognition software. Quite often unanticipated grammatical, syntax, homophones, and other interpretive errors are inadvertently transcribed by the computer software. Please disregards these errors. Please excuse any errors that have escaped final proofreading.

## 2021-07-14 NOTE — LETTER
[address]  \[facility]\"" EMERGENCY DEPT  [address] [place] [address]  [phone]    Work/School Note    Date: 7/14/2021    To Whom It May concern:    Kwame Coyle was seen and treated today in the emergency room by the following provider(s):  Attending Provider: Hebert Sanchez MD  Physician Assistant: Rodrigue Mcneil, Mya Rodríguez. Please excuse Kwame Coyle from work today and tomorrow.      Sincerely,          Opal Hendricks, [address]

## 2021-07-15 ENCOUNTER — VIRTUAL VISIT (OUTPATIENT)
Dept: INTERNAL MEDICINE CLINIC | Age: 31
End: 2021-07-15
Payer: MEDICAID

## 2021-07-15 DIAGNOSIS — F41.9 ANXIETY: ICD-10-CM

## 2021-07-15 DIAGNOSIS — F31.81 BIPOLAR II DISORDER (HCC): Primary | ICD-10-CM

## 2021-07-15 PROCEDURE — 90837 PSYTX W PT 60 MINUTES: CPT | Performed by: SOCIAL WORKER

## 2021-07-15 NOTE — PSYCHOTHERAPY NOTE
History of Present Illness: Dianna Huerta is a 28 y.o. female who presents with symptoms of depression, agitation and anxiety    Duration of session: 56+    Mental Status exam:         Sensorium  oriented to time, place and person   Relations cooperative   Appearance:  age appropriate   Motor Behavior:  within normal limits   Speech:  normal pitch and normal volume   Thought Process: goal directed   Thought Content free of delusions, free of hallucinations and not internally preoccupied    Suicidal ideations none   Homicidal ideations none   Mood:  irritable and stable   Affect:  full range   Memory recent  adequate   Memory remote:  adequate   Concentration:  adequate   Abstraction:  abstract   Insight:  good   Reliability good   Judgment:  good         DIAGNOSIS AND IMPRESSION:      Axis I: bipolar ii and Anxiety Disorder NOS  Axis II: No diagnosis  Axis III:   Past Medical History:   Diagnosis Date    Abnormal Pap smear     Anemia NEC     per pt takes iron PO    Asthma     bronchitis    Breastfeeding (infant)     Complication of anesthesia     never had    Depression     HX OTHER MEDICAL     Pregnant    [facility]     Axis IV: Problems with primary support group, Problems related to social environment, Occupational problems and Other psychosocial or environmental problems  Axis V:  61-70 mild symptoms        Client presents via doxy vv for f/u, presents in calm space, but feeling ill, getting through URI and menstrual.  Reports prior to this week has been doing well managing her moods.

## 2021-07-16 LAB
BACTERIA SPEC CULT: NORMAL
SERVICE CMNT-IMP: NORMAL

## 2021-08-29 ENCOUNTER — HOSPITAL ENCOUNTER (EMERGENCY)
Age: 31
Discharge: STILL ADMITTED OR ADMINISTRATIVE | End: 2021-08-29

## 2021-09-14 DIAGNOSIS — R51.9 NONINTRACTABLE HEADACHE, UNSPECIFIED CHRONICITY PATTERN, UNSPECIFIED HEADACHE TYPE: ICD-10-CM

## 2021-09-15 RX ORDER — ACETAMINOPHEN 500 MG
500 TABLET ORAL
Qty: 50 TABLET | Refills: 0 | OUTPATIENT
Start: 2021-09-15 | End: 2022-02-06

## 2021-10-07 ENCOUNTER — DOCUMENTATION ONLY (OUTPATIENT)
Dept: INTERNAL MEDICINE CLINIC | Age: 31
End: 2021-10-07

## 2021-12-28 ENCOUNTER — HOSPITAL ENCOUNTER (EMERGENCY)
Age: 31
Discharge: HOME | End: 2021-12-28
Attending: EMERGENCY MEDICINE
Payer: MEDICAID

## 2021-12-28 VITALS
DIASTOLIC BLOOD PRESSURE: 76 MMHG | HEART RATE: 95 BPM | OXYGEN SATURATION: 97 % | RESPIRATION RATE: 18 BRPM | TEMPERATURE: 97.9 F | SYSTOLIC BLOOD PRESSURE: 123 MMHG

## 2021-12-28 DIAGNOSIS — R05.9 COUGH: Primary | ICD-10-CM

## 2021-12-28 DIAGNOSIS — J20.9 ACUTE BRONCHITIS, UNSPECIFIED ORGANISM: ICD-10-CM

## 2021-12-28 LAB — SARS-COV-2, COV2: NORMAL

## 2021-12-28 PROCEDURE — U0005 INFEC AGEN DETEC AMPLI PROBE: HCPCS

## 2021-12-28 PROCEDURE — 99283 EMERGENCY DEPT VISIT LOW MDM: CPT

## 2021-12-28 RX ORDER — BENZONATATE 100 MG/1
100 CAPSULE ORAL
Qty: 20 CAPSULE | Refills: 0 | Status: SHIPPED | OUTPATIENT
Start: 2021-12-28 | End: 2022-01-04

## 2021-12-28 RX ORDER — ALBUTEROL SULFATE 90 UG/1
2 AEROSOL, METERED RESPIRATORY (INHALATION)
Qty: 1 EACH | Refills: 0 | Status: SHIPPED | OUTPATIENT
Start: 2021-12-28

## 2021-12-28 NOTE — ED PROVIDER NOTES
Cortney Nguyen is a 29 yo F with h/o Asthma who presents to the ED with cough and suspected bronchitis. She states that her cough started yesterday and she has run out of her albuterol MDI. She denies fever or chills. She had an appointment with her PCP justina 2 weeks ago for refill but the office cancled. Past Medical History:   Diagnosis Date    Abnormal Pap smear     Anemia NEC     per pt takes iron PO    Asthma     bronchitis    Breastfeeding (infant)     Complication of anesthesia     never had    Depression     HX OTHER MEDICAL     Pregnant    Mild episode of recurrent major depressive disorder ([facility] [address] 4/21/2020       Past Surgical History:   Procedure Laterality Date    HX GYN      c section         Family History:   Problem Relation Age of Onset    Cancer Maternal Grandfather     Diabetes Paternal Grandmother     Diabetes Father     Hypertension Father        Social History     Socioeconomic History    Marital status:      Spouse name: Not on file    Number of children: Not on file    Years of education: Not on file    Highest education level: Not on file   Occupational History    Not on file   Tobacco Use    Smoking status: Current Every Day Smoker     Packs/day: 0.50     Years: 3.00     Pack years: 1.50    Smokeless tobacco: Never Used   Substance and Sexual Activity    Alcohol use: Not Currently    Drug use: No    Sexual activity: Yes     Partners: Male     Birth control/protection: None   Other Topics Concern    Not on file   Social History Narrative    Not on file     Social Determinants of Health     Financial Resource Strain:     Difficulty of Paying Living Expenses: Not on file   Food Insecurity:     Worried About Running Out of Food in the Last Year: Not on file    Roberta of Food in the Last Year: Not on file   Transportation Needs:     Lack of Transportation (Medical): Not on file    Lack of Transportation (Non-Medical):  Not on file   Physical Activity:     Days of Exercise per Week: Not on file    Minutes of Exercise per Session: Not on file   Stress:     Feeling of Stress : Not on file   Social Connections:     Frequency of Communication with Friends and Family: Not on file    Frequency of Social Gatherings with Friends and Family: Not on file    Attends Orthodoxy Services: Not on file    Active Member of [address] [place] [address] or Organizations: Not on file    Attends Club or Organization Meetings: Not on file    Marital Status: Not on file   Intimate Partner Violence:     Fear of Current or Ex-Partner: Not on file    Emotionally Abused: Not on file    Physically Abused: Not on file    Sexually Abused: Not on file   Housing Stability:     Unable to Pay for Housing in the Last Year: Not on file    Number of [place] in the Last Year: Not on file    Unstable Housing in the Last Year: Not on file         ALLERGIES: Patient has no known allergies. Review of Systems   Constitutional: Negative for fever. HENT: Negative for sore throat. Eyes: Negative for visual disturbance. Respiratory: Positive for cough. Cardiovascular: Negative for chest pain. Gastrointestinal: Negative for abdominal pain. Genitourinary: Negative for dysuria. Musculoskeletal: Negative for back pain. Skin: Negative for rash. Neurological: Negative for headaches. Vitals:    12/28/21 1427 12/28/21 1548   BP: 132/81 123/76   Pulse: 97 95   Resp: 18    Temp: 98.1 °F (36.7 °C) 97.9 °F (36.6 °C)   SpO2: 95% 97%            Physical Exam  Vitals and nursing note reviewed. Constitutional:       General: She is not in acute distress. Appearance: She is well-developed. She is obese. HENT:      Head: Normocephalic and atraumatic. Eyes:      Conjunctiva/sclera: Conjunctivae normal.   Neck:      Trachea: Phonation normal.   Cardiovascular:      Rate and Rhythm: Normal rate. Pulmonary:      Effort: Pulmonary effort is normal. No respiratory distress.       Breath sounds: No wheezing or rales. Comments: Frequent dry cough  Abdominal:      General: There is no distension. Musculoskeletal:         General: No tenderness. Normal range of motion. Cervical back: Normal range of motion. Skin:     General: Skin is warm and dry. Neurological:      Mental Status: She is alert. She is not disoriented. Motor: No abnormal muscle tone. MDM    frequent cough, no respiratory distress. Albuterol MDI refilled as well as prescription for tessalon. COVID PCR swab obtained.       Procedures

## 2021-12-28 NOTE — ED TRIAGE NOTES
Coughing so hard that she is vomiting and she has had bloody nose 3 times when she blows her nose. Patient ran out of her inhaler last night cannot get PCP appointment. She has these symptoms at the peak of each season.

## 2021-12-29 LAB
SARS-COV-2, XPLCVT: NOT DETECTED
SOURCE, COVRS: NORMAL

## 2022-01-13 ENCOUNTER — HOSPITAL ENCOUNTER (EMERGENCY)
Age: 32
Discharge: HOME | End: 2022-01-14
Attending: EMERGENCY MEDICINE
Payer: MEDICAID

## 2022-01-13 DIAGNOSIS — U07.1 COVID-19: Primary | ICD-10-CM

## 2022-01-13 DIAGNOSIS — M79.10 MYALGIA: ICD-10-CM

## 2022-01-13 DIAGNOSIS — R68.83 CHILLS: ICD-10-CM

## 2022-01-13 PROCEDURE — 99281 EMR DPT VST MAYX REQ PHY/QHP: CPT

## 2022-01-13 NOTE — Clinical Note
[address]  [facility] EMERGENCY DEPT  [address] [address] [phone]    Work/School Note    Date: 1/13/2022     To Whom It May concern:    Daniel Ryan was evaluated by the following provider(s):  Attending Provider: Ernesto Parsons [address] [place] [address] virus is suspected. Per the CDC guidelines we recommend home isolation until the following conditions are all met:    1. At least five days have passed since symptoms first appeared and/or had a close exposure,   2. After home isolation for five days, wearing a mask around others for the next five days,  3. At least 24 have passed since last fever without the use of fever-reducing medications and  4.  Symptoms (eg cough, shortness of breath) have improved      Sincerely,          Gene Park MD

## 2022-01-14 ENCOUNTER — PATIENT OUTREACH (OUTPATIENT)
Dept: CASE MANAGEMENT | Age: 32
End: 2022-01-14

## 2022-01-14 VITALS
HEART RATE: 107 BPM | DIASTOLIC BLOOD PRESSURE: 66 MMHG | RESPIRATION RATE: 18 BRPM | SYSTOLIC BLOOD PRESSURE: 143 MMHG | OXYGEN SATURATION: 97 % | WEIGHT: 248.02 LBS | BODY MASS INDEX: 41.32 KG/M2 | HEIGHT: 65 IN | TEMPERATURE: 98.8 F

## 2022-01-14 PROCEDURE — 96361 HYDRATE IV INFUSION ADD-ON: CPT

## 2022-01-14 PROCEDURE — 74011250636 HC RX REV CODE- 250/636: Performed by: EMERGENCY MEDICINE

## 2022-01-14 PROCEDURE — 96374 THER/PROPH/DIAG INJ IV PUSH: CPT

## 2022-01-14 RX ORDER — ONDANSETRON 4 MG/1
4 TABLET, FILM COATED ORAL
Qty: 20 TABLET | Refills: 0 | Status: SHIPPED | OUTPATIENT
Start: 2022-01-14 | End: 2022-10-04

## 2022-01-14 RX ORDER — KETOROLAC TROMETHAMINE 30 MG/ML
15 INJECTION, SOLUTION INTRAMUSCULAR; INTRAVENOUS
Status: COMPLETED | OUTPATIENT
Start: 2022-01-14 | End: 2022-01-14

## 2022-01-14 RX ORDER — ALBUTEROL SULFATE 90 UG/1
2 AEROSOL, METERED RESPIRATORY (INHALATION)
Qty: 1 EACH | Refills: 0 | Status: SHIPPED | OUTPATIENT
Start: 2022-01-14 | End: 2022-10-04

## 2022-01-14 RX ORDER — NAPROXEN 500 MG/1
500 TABLET ORAL 2 TIMES DAILY WITH MEALS
Qty: 20 TABLET | Refills: 0 | Status: SHIPPED | OUTPATIENT
Start: 2022-01-14 | End: 2022-01-24

## 2022-01-14 RX ADMIN — SODIUM CHLORIDE 1000 ML: 9 INJECTION, SOLUTION INTRAVENOUS at 00:40

## 2022-01-14 RX ADMIN — KETOROLAC TROMETHAMINE 15 MG: 30 INJECTION, SOLUTION INTRAMUSCULAR; INTRAVENOUS at 00:41

## 2022-01-14 NOTE — PROGRESS NOTES
Ambulatory Care Coordination ED COVID Follow up Call    Challenges to be reviewed by the provider   Additional needs identified to be addressed with provider no  none           Encounter was not routed to provider for escalation. Method of communication with provider : none    Discussed COVID-19 related testing which was available at this time. Test results were positive. Patient informed of results, if available? Yes, pt did at home test which was positive. Current Symptoms: fever, fatigue, pain or aching joints and congestion, lack of appetite    Reviewed New or Changed Meds: yes    Do you have what you need at home?  Durable Medical Equipment ordered at discharge: None   Home Health/Outpatient orders at discharge: none    Pulse oximeter? no Discussed and confirmed pulse oximeter discharge instructions and when to notify provider or seek emergency care. Patient education provided: Reviewed appropriate site of care based on symptoms and resources available to patient including: PCP. Follow up appointment recommended: yes. If no appointment scheduled, scheduling offered: no.  No future appointments. Interventions: Obtained and reviewed discharge summary and/or continuity of care documents  Reviewed discharge instructions, medical action plan and red flags with patient who verbalized understanding. Pt sounds very congested, recommended mucinex. She said she is not eating and not drinking enough. Recommended she drink 8 ounces/hour while awake. She does not have a fever today. Provided contact information for future needs. No further follow-up call indicated based on severity of symptoms and risk factors.       Rupa Johnson RN

## 2022-01-14 NOTE — DISCHARGE INSTRUCTIONS
It was a pleasure taking care of you in our Emergency Department today. We know that when you come to [facility] you are entrusting us with your health, comfort, and safety. Our physicians and nurses honor that trust, and truly appreciate the opportunity to care for you and your loved ones. We also value your feedback. If you receive a survey about your Emergency Department experience today, please fill it out. We care about our patients' feedback, and we listen to what you have to say. Thank you!       Dr. Jeremias Rider MD.

## 2022-01-14 NOTE — ED PROVIDER NOTES
EMERGENCY DEPARTMENT HISTORY AND PHYSICAL EXAM     ------------------------------------------------------------------------------------------------------  Please note that this dictation was completed with [employer], the [employer] voice recognition software. Quite often unanticipated grammatical, syntax, homophones, and other interpretive errors are inadvertently transcribed by the computer software. Please disregard these errors. Please excuse any errors that have escaped final proofreading.  -----------------------------------------------------------------------------------------------------------------    Date: 1/13/2022  Patient Name: Liza Duque    History of Presenting Illness     Chief Complaint   Patient presents with    Generalized Body Aches     Reports testing positive for covid 19 today and cc 10/10 generalized body aches and headache. No relief from otc meds. History Provided By: Patient    HPI: Liza Duque is a 28 y.o. female, without significant pmhx, who presents via private vehicle  to the ED with concern for COVID -19. Notes feeling poorly yesterday waking up to go to work. Felt nauseous with body aches and chills. Had episode of vomiting and diarrhea as well. Notes having his COVID test yesterday without positive but notes having retested this evening with a faint positive line. Reports having received [employer] vaccine at the end of December. Pt also specifically denies any recent CP, SOB,  abd pain, changes in BM, urinary sxs, or headache. PCP: Vanesa Gaffney MD    Social Hx: denies tobacco, denies EtOH, denies recreational/ Illicit Drugs     There are no other complaints, changes, or physical findings at this time.      No Known Allergies      Current Facility-Administered Medications   Medication Dose Route Frequency Provider Last Rate Last Admin    sodium chloride 0.9 % bolus infusion 1,000 mL  1,000 mL IntraVENous NOW Judith Brandon MD        ketorolac (TORADOL) injection 15 mg  15 mg IntraVENous NOW Abhi Aguilar MD         Current Outpatient Medications   Medication Sig Dispense Refill    naproxen (Naprosyn) 500 mg tablet Take 1 Tablet by mouth two (2) times daily (with meals) for 10 days. 20 Tablet 0    albuterol (PROVENTIL HFA, VENTOLIN HFA, PROAIR HFA) 90 mcg/actuation inhaler Take 2 Puffs by inhalation every four (4) hours as needed for Wheezing. 1 Each 0    ondansetron hcl (Zofran) 4 mg tablet Take 1 Tablet by mouth every eight (8) hours as needed for Nausea. 20 Tablet 0    albuterol (PROVENTIL HFA, VENTOLIN HFA, PROAIR HFA) 90 mcg/actuation inhaler Take 2 Puffs by inhalation every four (4) hours as needed for Wheezing. 1 Each 0    acetaminophen (TYLENOL) 500 mg tablet Take 1 Tablet by mouth every six (6) hours as needed for Pain. 50 Tablet 0    predniSONE (STERAPRED DS) 10 mg dose pack Standard 6 day taper 21 Tablet 0    fluticasone propionate (FLONASE) 50 mcg/actuation nasal spray 2 Sprays by Both Nostrils route daily. 1 Bottle 0    loratadine-pseudoephedrine (Loratadine-D) 5-120 mg per tablet Take 1 Tablet by mouth two (2) times a day. 20 Tablet 0    busPIRone (BUSPAR) 7.5 mg tablet Take 1 Tab by mouth three (3) times daily. Take for anxiety 90 Tab 2    lurasidone (LATUDA) 40 mg tab tablet Take  by mouth. Unclear dosage. -RES   Indications: bipolar depression      divalproex ER (Depakote ER) 250 mg ER tablet Take 250 mg by mouth daily.  Indications: bipolar II         Past History     Past Medical History:  Past Medical History:   Diagnosis Date    Abnormal Pap smear     Anemia NEC     per pt takes iron PO    Asthma     bronchitis    Breastfeeding (infant)     Complication of anesthesia     never had    Depression     HX OTHER MEDICAL     Pregnant    Mild episode of recurrent major depressive disorder ([facility] [address] 4/21/2020       Past Surgical History:  Past Surgical History:   Procedure Laterality Date    HX GYN      c section       Family History:  Family History   Problem Relation Age of Onset    Cancer Maternal Grandfather     Diabetes Paternal Grandmother    Regine Gamboa Diabetes Father     Hypertension Father        Social History:  Social History     Tobacco Use    Smoking status: Current Every Day Smoker     Packs/day: 0.50     Years: 3.00     Pack years: 1.50    Smokeless tobacco: Never Used   Substance Use Topics    Alcohol use: Not Currently    Drug use: No       Allergies:  No Known Allergies      Review of Systems   Review of Systems   Constitutional: Positive for chills, fatigue and fever. Eyes: Negative. Respiratory: Negative. Negative for shortness of breath. Cardiovascular: Negative for chest pain. Gastrointestinal: Positive for nausea and vomiting. Negative for abdominal pain. Endocrine: Negative. Genitourinary: Negative. Negative for difficulty urinating, dysuria and hematuria. Musculoskeletal: Positive for myalgias. Skin: Negative. Neurological: Negative. Psychiatric/Behavioral: Negative for suicidal ideas. All other systems reviewed and are negative. Physical Exam   Physical Exam  Vitals and nursing note reviewed. Constitutional:       General: She is not in acute distress. Appearance: She is well-developed. She is not diaphoretic. HENT:      Head: Normocephalic and atraumatic. Nose: Nose normal.   Eyes:      General: No scleral icterus. Conjunctiva/sclera: Conjunctivae normal.   Neck:      Trachea: No tracheal deviation. Cardiovascular:      Rate and Rhythm: Regular rhythm. Tachycardia present. Heart sounds: Normal heart sounds. No murmur heard. No friction rub. Pulmonary:      Effort: Pulmonary effort is normal. No respiratory distress. Breath sounds: Normal breath sounds. No stridor. No wheezing or rales. Abdominal:      General: Bowel sounds are normal. There is no distension. Palpations: Abdomen is soft. Tenderness: There is no abdominal tenderness. There is no rebound. Musculoskeletal:         General: No tenderness. Normal range of motion. Cervical back: Normal range of motion. Skin:     General: Skin is warm and dry. Findings: No rash. Neurological:      Mental Status: She is alert and oriented to person, place, and time. Cranial Nerves: No cranial nerve deficit. Psychiatric:         Speech: Speech normal.         Behavior: Behavior normal.         Thought Content: Thought content normal.         Judgment: Judgment normal.           Diagnostic Study Results     Labs -   No results found for this or any previous visit (from the past 12 hour(s)). Radiologic Studies -   No orders to display     CT Results  (Last 48 hours)    None        CXR Results  (Last 48 hours)    None            Medical Decision Making   I am the first provider for this patient. I reviewed the vital signs, available nursing notes, past medical history, past surgical history, family history and social history. Vital Signs-Reviewed the patient's vital signs. Patient Vitals for the past 12 hrs:   Temp Pulse Resp BP SpO2   01/13/22 2347 98.8 °F (37.1 °C) (!) 125 18 (!) 143/66 97 %       Pulse Oximetry Analysis - 97% on RA Normal    Records Reviewed/Interpretted: Nursing Notes from triage and Old Medical Records, noting previous ER visit for acute bronchitis at the end of December    Provider Notes (Medical Decision Making):     DDX:  XERMT-84, dehydration    Plan:  IV fluids, analgesic    Impression:  COVID-19    ED Course:   Initial assessment performed. The patients presenting problems have been discussed, and they are in agreement with the care plan formulated and outlined with them. I have encouraged them to ask questions as they arise throughout their visit.     I reviewed our electronic medical record system for any past medical records that were available that may contribute to the patients current condition, the nursing notes and and vital signs from today's visit  Nursing notes will be reviewed as they become available in realtime while the pt has been in the ED. Dariel Tee MD        12:38 AM  Progress note:  Pt noted to be ready for discharge. Pt will follow up with PCP/this ED as instructed. All questions have been answered, pt voiced understanding and agreement with plan. Specific return precautions provided in addition to instructions for pt to return to the ED immediately should sx worsen at any time. Dariel Tee MD             Critical Care Time:     none      Diagnosis     Clinical Impression:   1. COVID-19    2. Myalgia    3. Chills        PLAN:  1. Current Discharge Medication List      START taking these medications    Details   naproxen (Naprosyn) 500 mg tablet Take 1 Tablet by mouth two (2) times daily (with meals) for 10 days. Qty: 20 Tablet, Refills: 0  Start date: 1/14/2022, End date: 1/24/2022      !! albuterol (PROVENTIL HFA, VENTOLIN HFA, PROAIR HFA) 90 mcg/actuation inhaler Take 2 Puffs by inhalation every four (4) hours as needed for Wheezing. Qty: 1 Each, Refills: 0  Start date: 1/14/2022      ondansetron hcl (Zofran) 4 mg tablet Take 1 Tablet by mouth every eight (8) hours as needed for Nausea. Qty: 20 Tablet, Refills: 0  Start date: 1/14/2022       !! - Potential duplicate medications found. Please discuss with provider. CONTINUE these medications which have NOT CHANGED    Details   !! albuterol (PROVENTIL HFA, VENTOLIN HFA, PROAIR HFA) 90 mcg/actuation inhaler Take 2 Puffs by inhalation every four (4) hours as needed for Wheezing. Qty: 1 Each, Refills: 0    Associated Diagnoses: Acute bronchitis, unspecified organism       !! - Potential duplicate medications found. Please discuss with provider.         2.   Follow-up Information     Follow up With Specialties Details Why Contact Info    Car Servin MD Family Medicine In 2 days  [place] [address]  [phone]      [facility] EMERGENCY DEPT Emergency Medicine  As needed [address] [facility]  [address] [facility]  [phone]        Return to ED if worse     Disposition:    12:38 AM    The patient's results have been reviewed with family and/or caregiver. They verbally convey their understanding and agreement of the patient's signs, symptoms, diagnosis, treatment and prognosis and additionally agree to follow up as recommended in the discharge instructions or to return to the Emergency Room should the patient's condition change prior to their follow-up appointment. The family and/or caregiver verbally agrees with the care-plan and all of their questions have been answered. The discharge instructions have also been provided to the them with educational information regarding the patient's diagnosis as well a list of reasons why the patient would want to return to the ER prior to their follow-up appointment should their condition change.   Saleem So MD

## 2022-01-17 ENCOUNTER — VIRTUAL VISIT (OUTPATIENT)
Dept: INTERNAL MEDICINE CLINIC | Age: 32
End: 2022-01-17
Payer: MEDICAID

## 2022-01-17 DIAGNOSIS — U07.1 COVID-19: Primary | ICD-10-CM

## 2022-01-17 DIAGNOSIS — Z79.899 PRESCRIPTION MEDICATION STARTED: ICD-10-CM

## 2022-01-17 DIAGNOSIS — J20.9 ACUTE BRONCHITIS, UNSPECIFIED ORGANISM: ICD-10-CM

## 2022-01-17 DIAGNOSIS — Z00.00 HEALTHCARE MAINTENANCE: ICD-10-CM

## 2022-01-17 PROCEDURE — 99214 OFFICE O/P EST MOD 30 MIN: CPT | Performed by: FAMILY MEDICINE

## 2022-01-17 RX ORDER — BENZONATATE 100 MG/1
200 CAPSULE ORAL
Qty: 60 CAPSULE | Refills: 0 | Status: SHIPPED | OUTPATIENT
Start: 2022-01-17 | End: 2022-01-27

## 2022-01-17 RX ORDER — AZITHROMYCIN 250 MG/1
TABLET, FILM COATED ORAL
Qty: 6 TABLET | Refills: 0 | Status: SHIPPED | OUTPATIENT
Start: 2022-01-17 | End: 2022-07-12

## 2022-01-17 RX ORDER — PREDNISONE 20 MG/1
20 TABLET ORAL 2 TIMES DAILY
Qty: 10 TABLET | Refills: 0 | Status: SHIPPED | OUTPATIENT
Start: 2022-01-17 | End: 2022-07-12

## 2022-01-17 RX ORDER — BISMUTH SUBSALICYLATE 262 MG
1 TABLET,CHEWABLE ORAL DAILY
Qty: 30 TABLET | Refills: 2 | Status: SHIPPED | OUTPATIENT
Start: 2022-01-17 | End: 2022-07-18 | Stop reason: SDUPTHER

## 2022-01-17 RX ORDER — ALBUTEROL SULFATE 90 UG/1
2 AEROSOL, METERED RESPIRATORY (INHALATION)
Qty: 18 G | Refills: 2 | Status: SHIPPED | OUTPATIENT
Start: 2022-01-17 | End: 2022-10-04

## 2022-01-17 NOTE — PROGRESS NOTES
Chief Complaint   Patient presents with    Positive For Covid-19     bronchitis is flaring up and needs something for the cough

## 2022-01-20 NOTE — PROGRESS NOTES
Yoan Sheikh (: 1990) is a 28 y.o. female, established patient, here for evaluation of the following chief complaint(s):   Positive For Covid-19 (bronchitis is flaring up and needs something for the cough)       ASSESSMENT/PLAN:  Below is the assessment and plan developed based on review of pertinent history, labs, studies, and medications. 1. COVID-19  2. Acute bronchitis, unspecified organism  -     azithromycin (ZITHROMAX) 250 mg tablet; Take 2 tablets today, then take 1 tablet daily for 4 days, Normal, Disp-6 Tablet, R-0  -     predniSONE (DELTASONE) 20 mg tablet; Take 20 mg by mouth two (2) times a day. Take with food. Indicated for wheezing, Normal, Disp-10 Tablet, R-0  -     albuterol (PROVENTIL HFA, VENTOLIN HFA, PROAIR HFA) 90 mcg/actuation inhaler; Take 2 Puffs by inhalation every six (6) hours as needed for Wheezing., Normal, Disp-18 g, R-2  -     benzonatate (Tessalon Perles) 100 mg capsule; Take 2 Capsules by mouth three (3) times daily as needed for Cough for up to 10 days. , Normal, Disp-60 Capsule, R-0  3. Healthcare maintenance  -     multivitamin (ONE A DAY) tablet; Take 1 Tablet by mouth daily. , Normal, Disp-30 Tablet, R-2    Patient will continue symptomatic treatment for COVID-19 and continue to monitor her pulse oximetry readings. Treat bronchitis with antibiotics steroids and beta agonist medication. Recommend fluids rest and avoidance of tobacco.    I have discussed the diagnosis with the patient and the intended plan as seen in the  orders above. The patient understands and agees with the plan. All questions the patient posed were answered. Patient labs and/or xrays were reviewed  Past records were reviewed. Advised patient to go to urgent care if symptoms worsen/change/persist.  Discussed expected course/resolution/complications of diagnosis in detail with patient.      Medication risks/benefits/costs/interactions/alternatives discussed with patient    Return in about 1 week (around 1/24/2022) for covid, bronchitis follow up. SUBJECTIVE/OBJECTIVE:  HPI  Established patient for COVID infection follow-up. Patient tested positive for COVID on 1/13/2022. She reports throat pain fever chills coughing loss of taste loss of smell poor appetite muscle aches and malaise. Many of her symptoms have improved. She was evaluated in the hospital and there was a concern about her heart rate. Her O2 was 97% at urgent care. She is using a pulse oximeter at home. She has had 1 week of gasping after waking up from her sleep with mild productive cough with yellow thick mucus and wheezing. She has a history of bronchitis. Tobacco history.   Review of Systems     No data recorded     Physical Exam    [INSTRUCTIONS:  \"[x]\" Indicates a positive item  \"[]\" Indicates a negative item  -- DELETE ALL ITEMS NOT EXAMINED]    Constitutional: [x] Appears well-developed and well-nourished [] No apparent distress      [x] Abnormal -mildly ill-appearing but no evidence of respiratory distress    Mental status: [x] Alert and awake  [x] Oriented to person/place/time [x] Able to follow commands    [] Abnormal -     Eyes:   EOM    [x]  Normal    [] Abnormal -   Sclera  [x]  Normal    [] Abnormal -          Discharge [x]  None visible   [] Abnormal -     HENT: [x] Normocephalic, atraumatic  [] Abnormal -   [x] Mouth/Throat: Mucous membranes are moist    External Ears [x] Normal  [] Abnormal -    Neck: [x] No visualized mass [] Abnormal -     Pulmonary/Chest: [x] Respiratory effort normal   [x] No visualized signs of difficulty breathing or respiratory distress        [] Abnormal -      Musculoskeletal:   [] Normal gait with no signs of ataxia         [x] Normal range of motion of neck        [] Abnormal -     Neurological:        [x] No Facial Asymmetry (Cranial nerve 7 motor function) (limited exam due to video visit)          [x] No gaze palsy        [] Abnormal -          Skin:        [x] No significant exanthematous lesions or discoloration noted on facial skin         [] Abnormal -            Psychiatric:       [x] Normal Affect [] Abnormal -        [x] No Hallucinations    Other pertinent observable physical exam findings:-            Jen Campbell, was evaluated through a synchronous (real-time) audio-video encounter. The patient (or guardian if applicable) is aware that this is a billable service, which includes applicable co-pays. Verbal consent to proceed has been obtained. The visit was conducted pursuant to the emergency declaration under the [address] [place] [address], [address] [place] [address] authority and the [employer] Act. Patient identification was verified, and a caregiver was present when appropriate. The patient was located at home in a state where the provider was licensed to provide care. An electronic signature was used to authenticate this note.   -- Gely Alvarado MD

## 2022-02-06 ENCOUNTER — HOSPITAL ENCOUNTER (EMERGENCY)
Age: 32
Discharge: HOME | End: 2022-02-06
Attending: EMERGENCY MEDICINE
Payer: MEDICAID

## 2022-02-06 VITALS
HEART RATE: 93 BPM | DIASTOLIC BLOOD PRESSURE: 97 MMHG | SYSTOLIC BLOOD PRESSURE: 125 MMHG | RESPIRATION RATE: 20 BRPM | BODY MASS INDEX: 36.65 KG/M2 | WEIGHT: 220 LBS | TEMPERATURE: 98.3 F | OXYGEN SATURATION: 100 % | HEIGHT: 65 IN

## 2022-02-06 DIAGNOSIS — S33.8XXA SPRAIN OF GROIN, INITIAL ENCOUNTER: Primary | ICD-10-CM

## 2022-02-06 PROCEDURE — 99282 EMERGENCY DEPT VISIT SF MDM: CPT

## 2022-02-06 RX ORDER — ACETAMINOPHEN 500 MG
1000 TABLET ORAL
Qty: 20 TABLET | Refills: 0 | Status: SHIPPED | OUTPATIENT
Start: 2022-02-06 | End: 2022-10-04 | Stop reason: SINTOL

## 2022-02-06 RX ORDER — DICLOFENAC SODIUM 75 MG/1
75 TABLET, DELAYED RELEASE ORAL 2 TIMES DAILY
Qty: 30 TABLET | Refills: 0 | Status: SHIPPED | OUTPATIENT
Start: 2022-02-06 | End: 2022-10-04

## 2022-02-06 RX ORDER — CYCLOBENZAPRINE HCL 10 MG
10 TABLET ORAL
Qty: 15 TABLET | Refills: 0 | Status: SHIPPED | OUTPATIENT
Start: 2022-02-06 | End: 2022-10-04

## 2022-02-06 NOTE — ED NOTES
Emergency Department Nursing Plan of Care       The Nursing Plan of Care is developed from the Nursing assessment and Emergency Department Attending provider initial evaluation. The plan of care may be reviewed in the ED Provider note.     The Plan of Care was developed with the following considerations:   Patient / Family readiness to learn indicated by:verbalized understanding  Persons(s) to be included in education: patient  Barriers to Learning/Limitations:No    Signed     Apoorva Mccoy RN    2/6/2022   2:46 PM

## 2022-02-06 NOTE — ED TRIAGE NOTES
Patient c/o left  hip pain. Has been having issues with both legs. Now my right leg is feeling numb from putting all my weight on that leg. Denies any trauma.

## 2022-02-06 NOTE — ED PROVIDER NOTES
EMERGENCY DEPARTMENT HISTORY AND PHYSICAL EXAM    Date: 2/6/2022  Patient Name: Patricia Tenorio    History of Presenting Illness     Chief Complaint   Patient presents with    Hip Pain         History Provided By: Patient    Chief Complaint: groin pain  Duration: onset Friday of this week   Timing:  Acute  Location: left groin  Quality: throbbing  Severity: 8 out of 10  Modifying Factors: worse when sitting  Associated Symptoms: denies any other associated signs or symptoms      HPI: Patricia Tenorio is a 28 y.o. female with a PMH of No significant past medical history who presents with left groin pain acute onset on Friday of this week. Denies injury. States she is busy working and taking care of her children. 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 States at times pain radiates down her leg. PCP: Heather Teixeira MD    Current Outpatient Medications   Medication Sig Dispense Refill    acetaminophen (Tylenol Extra Strength) 500 mg tablet Take 2 Tablets by mouth every six (6) hours as needed for Pain. 20 Tablet 0    diclofenac EC (VOLTAREN) 75 mg EC tablet Take 1 Tablet by mouth two (2) times a day. 30 Tablet 0    cyclobenzaprine (FLEXERIL) 10 mg tablet Take 1 Tablet by mouth three (3) times daily as needed for Muscle Spasm(s). 15 Tablet 0    azithromycin (ZITHROMAX) 250 mg tablet Take 2 tablets today, then take 1 tablet daily for 4 days 6 Tablet 0    predniSONE (DELTASONE) 20 mg tablet Take 20 mg by mouth two (2) times a day. Take with food. Indicated for wheezing 10 Tablet 0    multivitamin (ONE A DAY) tablet Take 1 Tablet by mouth daily. 30 Tablet 2    albuterol (PROVENTIL HFA, VENTOLIN HFA, PROAIR HFA) 90 mcg/actuation inhaler Take 2 Puffs by inhalation every six (6) hours as needed for Wheezing. 18 g 2    albuterol (PROVENTIL HFA, VENTOLIN HFA, PROAIR HFA) 90 mcg/actuation inhaler Take 2 Puffs by inhalation every four (4) hours as needed for Wheezing.  1 Each 0    ondansetron hcl (Zofran) 4 mg tablet Take 1 Tablet by mouth every eight (8) hours as needed for Nausea. 20 Tablet 0    albuterol (PROVENTIL HFA, VENTOLIN HFA, PROAIR HFA) 90 mcg/actuation inhaler Take 2 Puffs by inhalation every four (4) hours as needed for Wheezing. 1 Each 0    fluticasone propionate (FLONASE) 50 mcg/actuation nasal spray 2 Sprays by Both Nostrils route daily. 1 Bottle 0    lurasidone (LATUDA) 40 mg tab tablet Take  by mouth. Unclear dosage. -RES   Indications: bipolar depression      divalproex ER (Depakote ER) 250 mg ER tablet Take 250 mg by mouth daily. Indications: bipolar II         Past History     Past Medical History:  Past Medical History:   Diagnosis Date    Abnormal Pap smear     Anemia NEC     per pt takes iron PO    Asthma     bronchitis    Breastfeeding (infant)     Complication of anesthesia     never had    Depression     HX OTHER MEDICAL     Pregnant    Mild episode of recurrent major depressive disorder ([facility] [address] 4/21/2020       Past Surgical History:  Past Surgical History:   Procedure Laterality Date    HX GYN      c section    HX TUBAL LIGATION  12/14/2021       Family History:  Family History   Problem Relation Age of Onset    Cancer Maternal Grandfather     Diabetes Paternal Grandmother    Olmedo Diabetes Father     Hypertension Father        Social History:  Social History     Tobacco Use    Smoking status: Current Every Day Smoker     Packs/day: 0.50     Years: 3.00     Pack years: 1.50    Smokeless tobacco: Never Used   Vaping Use    Vaping Use: Never used   Substance Use Topics    Alcohol use: Not Currently    Drug use: No       Allergies:  No Known Allergies      Review of Systems   Review of Systems   Constitutional: Negative for fatigue and fever. Respiratory: Negative for shortness of breath and wheezing. Cardiovascular: Negative for chest pain. Gastrointestinal: Negative for abdominal pain. Musculoskeletal: Negative for arthralgias.         Groin pain   Skin: Negative for pallor and rash. Neurological: Negative for dizziness, tremors and headaches. All other systems reviewed and are negative. Physical Exam     Vitals:    02/06/22 1420   BP: (!) 125/97   Pulse: 93   Resp: 20   Temp: 98.3 °F (36.8 °C)   SpO2: 100%   Weight: 99.8 kg (220 lb)   Height: 5' 5\" (1.651 m)     Physical Exam  Vitals and nursing note reviewed. Constitutional:       General: She is not in acute distress. Appearance: She is well-developed. HENT:      Head: Normocephalic and atraumatic. Right Ear: External ear normal.      Left Ear: External ear normal.      Nose: Nose normal.   Eyes:      Conjunctiva/sclera: Conjunctivae normal.   Cardiovascular:      Rate and Rhythm: Normal rate and regular rhythm. Heart sounds: Normal heart sounds. Pulmonary:      Effort: Pulmonary effort is normal. No respiratory distress. Breath sounds: Normal breath sounds. No wheezing. Abdominal:      General: Bowel sounds are normal.      Palpations: Abdomen is soft. Tenderness: There is no abdominal tenderness. Musculoskeletal:         General: Normal range of motion. Cervical back: Normal range of motion and neck supple. Comments: +TTP left groin and left upper  Thigh no bruising   Lymphadenopathy:      Cervical: No cervical adenopathy. Skin:     General: Skin is warm and dry. Findings: No rash. Neurological:      Mental Status: She is alert and oriented to person, place, and time. Cranial Nerves: No cranial nerve deficit. Coordination: Coordination normal.   Psychiatric:         Behavior: Behavior normal.         Thought Content: Thought content normal.         Judgment: Judgment normal.           Diagnostic Study Results     Labs -   No results found for this or any previous visit (from the past 12 hour(s)).     Radiologic Studies -   No orders to display     CT Results  (Last 48 hours)    None        CXR Results  (Last 48 hours)    None            Medical Decision Making   I am the first provider for this patient. I reviewed the vital signs, available nursing notes, past medical history, past surgical history, family history and social history. Vital Signs-Reviewed the patient's vital signs. Records Reviewed: Nursing Notes    Provider Notes (Medical Decision Making):   DDX sprain strain contusion          Disposition:  home    DISCHARGE NOTE:   3:31 PM  I have discussed with patient their diagnosis, treatment, and follow up plan. The patient agrees to follow up as outlined in discharge paperwork and also to return to the ED with any worsening. Phoebe Saunders NP        Follow-up Information     Follow up With Specialties Details Why Contact Info    Shana Andrews MD Family Medicine   [facility]  [address]  [address] [address], [place]  [phone]            Current Discharge Medication List      START taking these medications    Details   diclofenac EC (VOLTAREN) 75 mg EC tablet Take 1 Tablet by mouth two (2) times a day. Qty: 30 Tablet, Refills: 0  Start date: 2/6/2022      cyclobenzaprine (FLEXERIL) 10 mg tablet Take 1 Tablet by mouth three (3) times daily as needed for Muscle Spasm(s). Qty: 15 Tablet, Refills: 0  Start date: 2/6/2022         CONTINUE these medications which have CHANGED    Details   acetaminophen (Tylenol Extra Strength) 500 mg tablet Take 2 Tablets by mouth every six (6) hours as needed for Pain. Qty: 20 Tablet, Refills: 0  Start date: 2/6/2022             Procedures:  Procedures    Please note that this dictation was completed with Dragon, computer voice recognition software. Quite often unanticipated grammatical, syntax, homophones, and other interpretive errors are inadvertently transcribed by the computer software. Please disregard these errors. Additionally, please excuse any errors that have escaped final proofreading. Diagnosis     Clinical Impression:   1.  Sprain of groin, initial encounter

## 2022-03-18 PROBLEM — F31.81 BIPOLAR II DISORDER (HCC): Status: ACTIVE | Noted: 2021-02-18

## 2022-03-19 PROBLEM — N92.0 EXCESS, MENSTRUATION: Status: ACTIVE | Noted: 2019-05-08

## 2022-03-19 PROBLEM — F41.9 ANXIETY: Status: ACTIVE | Noted: 2020-04-20

## 2022-03-20 PROBLEM — E66.01 SEVERE OBESITY (HCC): Status: ACTIVE | Noted: 2019-05-08

## 2022-03-20 PROBLEM — M62.838 MUSCLE SPASM: Status: ACTIVE | Noted: 2020-04-20

## 2022-03-20 PROBLEM — D50.9 IRON DEFICIENCY ANEMIA: Status: ACTIVE | Noted: 2020-05-18

## 2022-06-08 ENCOUNTER — APPOINTMENT (OUTPATIENT)
Dept: GENERAL RADIOLOGY | Age: 32
End: 2022-06-08
Attending: EMERGENCY MEDICINE
Payer: MEDICAID

## 2022-06-08 ENCOUNTER — HOSPITAL ENCOUNTER (EMERGENCY)
Age: 32
Discharge: HOME | End: 2022-06-09
Attending: EMERGENCY MEDICINE | Admitting: EMERGENCY MEDICINE
Payer: MEDICAID

## 2022-06-08 DIAGNOSIS — R05.9 COUGH: ICD-10-CM

## 2022-06-08 DIAGNOSIS — U07.1 PNEUMONIA DUE TO COVID-19 VIRUS: Primary | ICD-10-CM

## 2022-06-08 DIAGNOSIS — J12.82 PNEUMONIA DUE TO COVID-19 VIRUS: Primary | ICD-10-CM

## 2022-06-08 LAB — HCG UR QL: NEGATIVE

## 2022-06-08 PROCEDURE — 81025 URINE PREGNANCY TEST: CPT

## 2022-06-08 PROCEDURE — 71046 X-RAY EXAM CHEST 2 VIEWS: CPT

## 2022-06-08 PROCEDURE — 99283 EMERGENCY DEPT VISIT LOW MDM: CPT

## 2022-06-08 NOTE — Clinical Note
[address]  \[facility]\"" EMERGENCY DEPT  [address] [address]  [phone]    Work/School Note    Date: 6/8/2022     To Whom It May concern:    Jimmiececil Duffy was evaluated by the following provider(s):  Attending Provider: Felecia Armenta MD.   [place] virus is suspected. Per the CDC guidelines we recommend home isolation until the following conditions are all met:    1. At least five days have passed since symptoms first appeared and/or had a close exposure,   2. After home isolation for five days, wearing a mask around others for the next five days,  3. At least 24 have passed since last fever without the use of fever-reducing medications and  4.  Symptoms (eg cough, shortness of breath) have improved      Sincerely,          Jose Byrd MD

## 2022-06-09 VITALS
DIASTOLIC BLOOD PRESSURE: 85 MMHG | SYSTOLIC BLOOD PRESSURE: 124 MMHG | BODY MASS INDEX: 39.65 KG/M2 | TEMPERATURE: 98.3 F | OXYGEN SATURATION: 97 % | HEART RATE: 97 BPM | HEIGHT: 66 IN | RESPIRATION RATE: 18 BRPM | WEIGHT: 246.69 LBS

## 2022-06-09 LAB
COVID-19 RAPID TEST, COVR: DETECTED
SOURCE, COVRS: ABNORMAL

## 2022-06-09 PROCEDURE — 87635 SARS-COV-2 COVID-19 AMP PRB: CPT

## 2022-06-09 PROCEDURE — 74011250637 HC RX REV CODE- 250/637: Performed by: EMERGENCY MEDICINE

## 2022-06-09 RX ORDER — GUAIFENESIN 100 MG/5ML
200 SOLUTION ORAL
Qty: 1 EACH | Refills: 0 | Status: SHIPPED | OUTPATIENT
Start: 2022-06-09 | End: 2022-06-14

## 2022-06-09 RX ORDER — DEXAMETHASONE SODIUM PHOSPHATE 10 MG/ML
10 INJECTION INTRAMUSCULAR; INTRAVENOUS ONCE
Status: DISCONTINUED | OUTPATIENT
Start: 2022-06-09 | End: 2022-06-09

## 2022-06-09 RX ORDER — AZITHROMYCIN 250 MG/1
500 TABLET, FILM COATED ORAL
Status: COMPLETED | OUTPATIENT
Start: 2022-06-09 | End: 2022-06-09

## 2022-06-09 RX ORDER — CODEINE PHOSPHATE AND GUAIFENESIN 10; 100 MG/5ML; MG/5ML
10 SOLUTION ORAL
Status: COMPLETED | OUTPATIENT
Start: 2022-06-09 | End: 2022-06-09

## 2022-06-09 RX ORDER — AZITHROMYCIN 250 MG/1
500 TABLET, FILM COATED ORAL
Status: DISCONTINUED | OUTPATIENT
Start: 2022-06-09 | End: 2022-06-09

## 2022-06-09 RX ORDER — AZITHROMYCIN 250 MG/1
250 TABLET, FILM COATED ORAL DAILY
Qty: 5 TABLET | Refills: 0 | Status: SHIPPED | OUTPATIENT
Start: 2022-06-09 | End: 2022-06-14

## 2022-06-09 RX ORDER — ALBUTEROL SULFATE 90 UG/1
6 AEROSOL, METERED RESPIRATORY (INHALATION)
Status: COMPLETED | OUTPATIENT
Start: 2022-06-09 | End: 2022-06-09

## 2022-06-09 RX ORDER — DEXAMETHASONE SODIUM PHOSPHATE 10 MG/ML
10 INJECTION INTRAMUSCULAR; INTRAVENOUS ONCE
Status: COMPLETED | OUTPATIENT
Start: 2022-06-09 | End: 2022-06-09

## 2022-06-09 RX ADMIN — AZITHROMYCIN 500 MG: 250 TABLET, FILM COATED ORAL at 03:04

## 2022-06-09 RX ADMIN — DEXAMETHASONE SODIUM PHOSPHATE 10 MG: 10 INJECTION, SOLUTION INTRAMUSCULAR; INTRAVENOUS at 03:05

## 2022-06-09 RX ADMIN — ALBUTEROL SULFATE 6 PUFF: 90 AEROSOL, METERED RESPIRATORY (INHALATION) at 03:03

## 2022-06-09 RX ADMIN — GUAIFENESIN AND CODEINE PHOSPHATE 10 ML: 100; 10 SOLUTION ORAL at 01:02

## 2022-06-09 NOTE — DISCHARGE INSTRUCTIONS
Interim Guidance for Preventing the Spread of Coronavirus Disease 2019 (COVID-19) in Homes and Residential Communities    Prevention steps for:  People with confirmed or suspected COVID-19 (including persons under investigation) who do not need to be hospitalized  and  People with confirmed COVID-19 who were hospitalized and determined to be medically stable to go home  Your healthcare provider and public health staff will evaluate whether you can be cared for at home. If it is determined that you do not need to be hospitalized and can be isolated at home, you will be monitored by staff from your local or state health department. You should follow the prevention steps below until a healthcare provider or local or state health department says you can return to your normal activities. Stay home except to get medical care  You should restrict activities outside your home, except for getting medical care. Do not go to work, school, or public areas. Avoid using public transportation, ride-sharing, or taxis. Separate yourself from other people and animals in your home  People: As much as possible, you should stay in a specific room and away from other people in your home. Also, you should use a separate bathroom, if available. Animals: You should restrict contact with pets and other animals while you are sick with COVID-19, just like you would around other people. Although there have not been reports of pets or other animals becoming sick with COVID-19, it is still recommended that people sick with COVID-19 limit contact with animals until more information is known about the virus. When possible, have another member of your household care for your animals while you are sick. If you are sick with COVID-19, avoid contact with your pet, including petting, snuggling, being kissed or licked, and sharing food.  If you must care for your pet or be around animals while you are sick, wash your hands before and after you interact with pets and wear a facemask. Call ahead before visiting your doctor  If you have a medical appointment, call the healthcare provider and tell them that you have or may have COVID-19. This will help the healthcare provider's office take steps to keep other people from getting infected or exposed. Wear a facemask  You should wear a facemask when you are around other people (e.g., sharing a room or vehicle) or pets and before you enter a healthcare provider's office. If you are not able to wear a facemask (for example, because it causes trouble breathing), then people who live with you should not stay in the same room with you, or they should wear a facemask if they enter your room. Cover your coughs and sneezes  Cover your mouth and nose with a tissue when you cough or sneeze. Throw used tissues in a lined trash can; immediately wash your hands with soap and water for at least 20 seconds or clean your hands with an alcohol-based hand  that contains 60 to 95% alcohol, covering all surfaces of your hands and rubbing them together until they feel dry. Soap and water should be used preferentially if hands are visibly dirty. Clean your hands often  Wash your hands often with soap and water for at least 20 seconds or clean your hands with an alcohol-based hand  that contains 60 to 95% alcohol, covering all surfaces of your hands and rubbing them together until they feel dry. Soap and water should be used preferentially if hands are visibly dirty. Avoid touching your eyes, nose, and mouth with unwashed hands. Avoid sharing personal household items  You should not share dishes, drinking glasses, cups, eating utensils, towels, or bedding with other people or pets in your home. After using these items, they should be washed thoroughly with soap and water.   Clean all high-touch surfaces everyday  High touch surfaces include counters, tabletops, doorknobs, bathroom fixtures, toilets, phones, keyboards, tablets, and bedside tables. Also, clean any surfaces that may have blood, stool, or body fluids on them. Use a household cleaning spray or wipe, according to the label instructions. Labels contain instructions for safe and effective use of the cleaning product including precautions you should take when applying the product, such as wearing gloves and making sure you have good ventilation during use of the product. Monitor your symptoms  Seek prompt medical attention if your illness is worsening (e.g., difficulty breathing). Before seeking care, call your healthcare provider and tell them that you have, or are being evaluated for, COVID-19. Put on a facemask before you enter the facility. These steps will help the healthcare provider's office to keep other people in the office or waiting room from getting infected or exposed. Ask your healthcare provider to call the local or state health department. Persons who are placed under active monitoring or facilitated self-monitoring should follow instructions provided by their local health department or occupational health professionals, as appropriate. If you have a medical emergency and need to call 911, notify the dispatch personnel that you have, or are being evaluated for COVID-19. If possible, put on a facemask before emergency medical services arrive. Discontinuing home isolation  Patients with confirmed COVID-19 should remain under home isolation precautions until the risk of secondary transmission to others is thought to be low. The decision to discontinue home isolation precautions should be made on a case-by-case basis, in consultation with healthcare providers and [facility] and local health departments. Oupatient testing  You can now get tested on an outpatient basis if you are showing symptoms of Covid19 at one of the [facility] locations by appointment only. Please visit [email or web address] to make an appointment. Recommended precautions for household members, intimate partners, and caregivers in a nonhealthcare setting of  A patient with symptomatic laboratory-confirmed COVID-19  or  A patient under investigation  Household members, intimate partners, and caregivers in a nonhealthcare setting may have close contact2 with a person with symptomatic, laboratory-confirmed COVID-19 or a person under investigation. Close contacts should monitor their health; they should call their healthcare provider right away if they develop symptoms suggestive of COVID-19 (e.g., fever, cough, shortness of breath)   Close contacts should also follow these recommendations:  Make sure that you understand and can help the patient follow their healthcare provider's instructions for medication(s) and care. You should help the patient with basic needs in the home and provide support for getting groceries, prescriptions, and other personal needs. Monitor the patient's symptoms. If the patient is getting sicker, call his or her healthcare provider and tell them that the patient has laboratory-confirmed COVID-19. This will help the healthcare provider's office take steps to keep other people in the office or waiting room from getting infected. Ask the healthcare provider to call the local or [facility] access hospital health department for additional guidance. If the patient has a medical emergency and you need to call 911, notify the dispatch personnel that the patient has, or is being evaluated for COVID-19. Household members should stay in another room or be  from the patient as much as possible. Household members should use a separate bedroom and bathroom, if available. Prohibit visitors who do not have an essential need to be in the home. Household members should care for any pets in the home. Do not handle pets or other animals while sick.     Make sure that shared spaces in the home have good air flow, such as by an air conditioner or an opened window, weather permitting. Perform hand hygiene frequently. Wash your hands often with soap and water for at least 20 seconds or use an alcohol-based hand  that contains 60 to 95% alcohol, covering all surfaces of your hands and rubbing them together until they feel dry. Soap and water should be used preferentially if hands are visibly dirty. Avoid touching your eyes, nose, and mouth with unwashed hands. You and the patient should wear a facemask if you are in the same room. Wear a disposable facemask and gloves when you touch or have contact with the patient's blood, stool, or body fluids, such as saliva, sputum, nasal mucus, vomit, urine. Throw out disposable facemasks and gloves after using them. Do not reuse. When removing personal protective equipment, first remove and dispose of gloves. Then, immediately clean your hands with soap and water or alcohol-based hand . Next, remove and dispose of facemask, and immediately clean your hands again with soap and water or alcohol-based hand . Avoid sharing household items with the patient. You should not share dishes, drinking glasses, cups, eating utensils, towels, bedding, or other items. After the patient uses these items, you should wash them thoroughly (see below [employer]). Clean all high-touch surfaces, such as counters, tabletops, doorknobs, bathroom fixtures, toilets, phones, keyboards, tablets, and bedside tables, every day. Also, clean any surfaces that may have blood, stool, or body fluids on them. Use a household cleaning spray or wipe, according to the label instructions. Labels contain instructions for safe and effective use of the cleaning product including precautions you should take when applying the product, such as wearing gloves and making sure you have good ventilation during use of the product. [address] thoroughly.   Immediately remove and wash clothes or bedding that have blood, stool, or body fluids on them. Wear disposable gloves while handling soiled items and keep soiled items away from your body. Clean your hands (with soap and water or an alcohol-based hand ) immediately after removing your gloves. Read and follow directions on labels of laundry or clothing items and detergent. In general, using a normal laundry detergent according to washing machine instructions and dry thoroughly using the warmest temperatures recommended on the clothing label. Place all used disposable gloves, facemasks, and other contaminated items in a lined container before disposing of them with other household waste. Clean your hands (with soap and water or an alcohol-based hand ) immediately after handling these items. Soap and water should be used preferentially if hands are visibly dirty. Discuss any additional questions with your state or local health department or healthcare provider. Footnotes  2Close contact is defined as--  a) being within approximately 6 feet (2 meters) of a COVID-19 case for a prolonged period of time; close contact can occur while caring for, living with, visiting, or sharing a health care waiting area or room with a COVID-19 case  - or -  b) having direct contact with infectious secretions of a COVID-19 case (e.g., being coughed on). Content source: [facility] for Immunization and Respiratory Diseases ([facility]), Division of Viral Diseases  Understanding Coronavirus Disease 2019 (COVID-19)  The information for the Coronavirus Disease 2019 (COVID-19) is rapidly evolving. To access the most current information place the following URL in your browser to access the CDC site. [employer]     This information is not intended as a substitute for professional medical care. Always follow your health care provider's instructions.

## 2022-06-09 NOTE — ED PROVIDER NOTES
EMERGENCY DEPARTMENT HISTORY AND PHYSICAL EXAM    Please note that this dictation was completed with [employer], the computer voice recognition software. Quite often unanticipated grammatical, syntax, homophones, and other interpretive errors are inadvertently transcribed by the computer software. Please disregard these errors. Please excuse any errors that have escaped final proofreading. Date: 6/8/2022  Patient Name: Kristofer Burns  Patient Age and Sex: 28 y.o. female    History of Presenting Illness     Chief Complaint   Patient presents with    Cough       History Provided By: Patient     Chief Complaint: cough, sob, chills      HPI: Kristofer Burns, is a 28 y.o. female with history of controlled asthma, presents to the ED with a nonproductive cough, non-exertional moderate sob that is constant, chills, generalized malaise. No known sick contacts but works in [employer] and exposed to strangers who are not always masked. She had covid in January of this year, recovered from it and was treated outpatient. She received one J&J vaccine against covid. No recent hospitalizations. No cp, orthopnea, leg swelling/edema, PND. Also denies n/v/abd pain. No urinary symptoms. Tried a few OTC cold remedies without relief. Pt denies any other alleviating or exacerbating factors. No other associated signs or symptoms. There are no other complaints, changes or physical findings at this time.      PCP: Karis Bryson MD    Past History   All documented elements of the [employer] reviewed and verified by me. -Balaji Mustafa MD    Past Medical History:  Past Medical History:   Diagnosis Date    Abnormal Pap smear     Anemia NEC     per pt takes iron PO    Asthma     bronchitis    Breastfeeding (infant)     Complication of anesthesia     never had    Depression     HX OTHER MEDICAL     Pregnant    Mild episode of recurrent major depressive disorder ([facility] [address] 4/21/2020       Past Surgical History:  Past Surgical History: Procedure Laterality Date    HX GYN      c section    HX TUBAL LIGATION  12/14/2021       Family History:   Family History   Problem Relation Age of Onset    Cancer Maternal Grandfather     Diabetes Paternal Grandmother    [facility] Diabetes Father     Hypertension Father        Social History:  Social History     Tobacco Use    Smoking status: Current Every Day Smoker     Packs/day: 1.00     Years: 3.00     Pack years: 3.00    Smokeless tobacco: Never Used    Tobacco comment: black and milds   Vaping Use    Vaping Use: Never used   Substance Use Topics    Alcohol use: Not Currently    Drug use: No       Current Medications:  No current facility-administered medications on file prior to encounter. Current Outpatient Medications on File Prior to Encounter   Medication Sig Dispense Refill    acetaminophen (Tylenol Extra Strength) 500 mg tablet Take 2 Tablets by mouth every six (6) hours as needed for Pain. 20 Tablet 0    diclofenac EC (VOLTAREN) 75 mg EC tablet Take 1 Tablet by mouth two (2) times a day. 30 Tablet 0    cyclobenzaprine (FLEXERIL) 10 mg tablet Take 1 Tablet by mouth three (3) times daily as needed for Muscle Spasm(s). 15 Tablet 0    azithromycin (ZITHROMAX) 250 mg tablet Take 2 tablets today, then take 1 tablet daily for 4 days 6 Tablet 0    predniSONE (DELTASONE) 20 mg tablet Take 20 mg by mouth two (2) times a day. Take with food. Indicated for wheezing 10 Tablet 0    multivitamin (ONE A DAY) tablet Take 1 Tablet by mouth daily. 30 Tablet 2    albuterol (PROVENTIL HFA, VENTOLIN HFA, PROAIR HFA) 90 mcg/actuation inhaler Take 2 Puffs by inhalation every six (6) hours as needed for Wheezing. 18 g 2    albuterol (PROVENTIL HFA, VENTOLIN HFA, PROAIR HFA) 90 mcg/actuation inhaler Take 2 Puffs by inhalation every four (4) hours as needed for Wheezing. 1 Each 0    ondansetron hcl (Zofran) 4 mg tablet Take 1 Tablet by mouth every eight (8) hours as needed for Nausea.  20 Tablet 0    albuterol (PROVENTIL HFA, VENTOLIN HFA, PROAIR HFA) 90 mcg/actuation inhaler Take 2 Puffs by inhalation every four (4) hours as needed for Wheezing. 1 Each 0    fluticasone propionate (FLONASE) 50 mcg/actuation nasal spray 2 Sprays by Both Nostrils route daily. 1 Bottle 0    lurasidone (LATUDA) 40 mg tab tablet Take  by mouth. Unclear dosage. -RES   Indications: bipolar depression      divalproex ER (Depakote ER) 250 mg ER tablet Take 250 mg by mouth daily. Indications: bipolar II         Allergies:  No Known Allergies    Review of Systems   All other systems reviewed and negative    Review of Systems   Constitutional: Positive for chills and fatigue. HENT: Negative. Negative for congestion and sore throat. Eyes: Negative. Negative for visual disturbance. Respiratory: Positive for cough and shortness of breath. Cardiovascular: Negative for chest pain, palpitations and leg swelling. Gastrointestinal: Negative for abdominal pain, diarrhea, nausea and vomiting. Endocrine: Negative. Genitourinary: Negative for dysuria, flank pain, hematuria and pelvic pain. Musculoskeletal: Negative for joint swelling, myalgias, neck pain and neck stiffness. Skin: Negative for color change, pallor and rash. Neurological: Negative for syncope, weakness, light-headedness, numbness and headaches. Hematological: Negative for adenopathy. Does not bruise/bleed easily. Psychiatric/Behavioral: Negative. Negative for confusion. All other systems reviewed and are negative. Physical Exam   Reviewed patients vital signs and nursing note    Physical Exam  Vitals and nursing note reviewed. Constitutional:       Appearance: She is not diaphoretic. HENT:      Head: Atraumatic. Nose: Nose normal.      Mouth/Throat:      Mouth: Mucous membranes are moist.   Eyes:      Extraocular Movements: Extraocular movements intact. Conjunctiva/sclera: Conjunctivae normal.   Neck:      Vascular: No JVD.    Cardiovascular: Rate and Rhythm: Normal rate and regular rhythm. Pulses: Normal pulses. Heart sounds: Normal heart sounds. Pulmonary:      Effort: Pulmonary effort is normal.      Breath sounds: Normal breath sounds. Abdominal:      General: There is no distension. Tenderness: There is no abdominal tenderness. Musculoskeletal:         General: No tenderness. Normal range of motion. Cervical back: Normal range of motion and neck supple. No rigidity or tenderness. Right lower leg: No edema. Left lower leg: No edema. Skin:     General: Skin is warm and dry. Capillary Refill: Capillary refill takes less than 2 seconds. Findings: No rash. Neurological:      General: No focal deficit present. Mental Status: She is alert and oriented to person, place, and time. Psychiatric:         Mood and Affect: Mood normal.         Behavior: Behavior normal.         Diagnostic Study Results     Labs - I have personally reviewed and interpreted all laboratory results. Interpretation of available and pertinent results detailed below in MDM. Piter Eason MD, MSc  Recent Results (from the past 24 hour(s))   HCG URINE, QL. - POC    Collection Time: 06/08/22 11:45 PM   Result Value Ref Range    Pregnancy test,urine (POC) Negative NEG     COVID-19 RAPID TEST    Collection Time: 06/09/22  1:03 AM   Result Value Ref Range    Specimen source Nasopharyngeal      COVID-19 rapid test Detected (A) NOTD         Radiologic Studies - I have personally reviewed and interpreted (see [facility] for brief interpreation of available results) all imaging studies and agree with radiology interpretation and report. - Piter Eason MD, MSc  XR CHEST PA LAT   Final Result   No acute cardiopulmonary process            Medical Decision Making   I am the first provider for this patient.     Records Reviewed:   I reviewed our electronic medical record system for any past medical records that were available that may contribute to the patient's current condition, including their PMH, surgical history, social and family history. This includes most recent ED visits, any available discharge summaries and prior ECGs, which I have reviewed and interpreted personally. I have summarized most salient findings in my HPI and MDM. Shailesh Duggan MD, MSc    I also reviewed the nursing notes and vital signs from today's visit. Nursing notes will be reviewed as they become available in realtime while the pt has been in the ED. Shailesh Duggan MD, MSc      Vital Signs-Reviewed the patient's vital signs. Patient Vitals for the past 24 hrs:   Temp Pulse Resp BP SpO2   06/08/22 2335 98.3 °F (36.8 °C) 100 18 101/73 99 %         Pulse ox interpreted by me: 97% on RA with good pleth. Provider Notes (Medical Decision Making): The evaluation, management, and disposition decisions of this patient have been made in the context of the current COVID-19 pandemic. In my clinical judgment, the balance of clinical factors dictate expedited evaluation and discharge from the ED. I have carefully considered the risk and benefits of prolonged ED workups and/or hospitalization vs their risk of acquiring or transmitting COVID-19. I have made reasonable efforts to conserve healthcare resources and defer to safe outpatient alternatives when feasible. I have also discussed the importance of social distancing and proper hygiene to the patient. Based on an appropriate medical screening exam, there is currently no evidence of an emergency medical condition in the patient, and she is clinically safe for discharge. This was a collective decision made with the patient and/or any available family/caretakers. They expressed understanding and agreement with the above. Patient's pulse ox interpreted by me personally, with good waveform it is 97% on RA. ED Course:   Initial assessment performed.  The patients presenting problems have been discussed, and they are in agreement with the care plan formulated and outlined with them. I have encouraged them to ask questions as they arise throughout their visit. Progress note:  Patient has been reassessed and reports feeling considerably better, has normal vital signs and feels comfortable going home. I think this is reasonable as no findings today suggest concern for respiratory failure. Will treat with: bronchodilators, azithro given worsening cough    DISPOSITION: DISCHARGE  The patient's results have been reviewed with patient and available family and/or caregiver. They verbally convey their understanding and agreement of the patient's signs, symptoms, diagnosis, treatment and prognosis and additionally agree to follow up as recommended in the discharge instructions or to return to the Emergency Department should the patient's condition change prior to their follow-up appointment. The patient and available family and/or caregiver verbally agree with the care plan and all of their questions have been answered. The discharge instructions have also been provided to the them with educational information regarding the patient's diagnosis as well a list of reasons why the patient would want to return to the ER prior to their follow-up appointment should any concerns arise, the patient's condition change or symptoms worsen. Le Piña MD, Msc    PLAN:  Discharge Medication List as of 6/9/2022  2:16 AM      START taking these medications    Details   !! azithromycin (Zithromax Z-Hayden) 250 mg tablet Take 1 Tablet by mouth daily for 5 days. , Normal, Disp-5 Tablet, R-0      guaiFENesin (ROBITUSSIN) 100 mg/5 mL liquid Take 10 mL by mouth three (3) times daily as needed for Cough for up to 5 days. , Normal, Disp-1 Each, R-0       !! - Potential duplicate medications found. Please discuss with provider.       CONTINUE these medications which have NOT CHANGED    Details   acetaminophen (Tylenol Extra Strength) 500 mg tablet Take 2 Tablets by mouth every six (6) hours as needed for Pain., Normal, Disp-20 Tablet, R-0      diclofenac EC (VOLTAREN) 75 mg EC tablet Take 1 Tablet by mouth two (2) times a day., Normal, Disp-30 Tablet, R-0      cyclobenzaprine (FLEXERIL) 10 mg tablet Take 1 Tablet by mouth three (3) times daily as needed for Muscle Spasm(s). , Normal, Disp-15 Tablet, R-0      !! azithromycin (ZITHROMAX) 250 mg tablet Take 2 tablets today, then take 1 tablet daily for 4 days, Normal, Disp-6 Tablet, R-0      predniSONE (DELTASONE) 20 mg tablet Take 20 mg by mouth two (2) times a day. Take with food. Indicated for wheezing, Normal, Disp-10 Tablet, R-0      multivitamin (ONE A DAY) tablet Take 1 Tablet by mouth daily. , Normal, Disp-30 Tablet, R-2      !! albuterol (PROVENTIL HFA, VENTOLIN HFA, PROAIR HFA) 90 mcg/actuation inhaler Take 2 Puffs by inhalation every six (6) hours as needed for Wheezing., Normal, Disp-18 g, R-2      !! albuterol (PROVENTIL HFA, VENTOLIN HFA, PROAIR HFA) 90 mcg/actuation inhaler Take 2 Puffs by inhalation every four (4) hours as needed for Wheezing., Normal, Disp-1 Each, R-0      ondansetron hcl (Zofran) 4 mg tablet Take 1 Tablet by mouth every eight (8) hours as needed for Nausea., Normal, Disp-20 Tablet, R-0      !! albuterol (PROVENTIL HFA, VENTOLIN HFA, PROAIR HFA) 90 mcg/actuation inhaler Take 2 Puffs by inhalation every four (4) hours as needed for Wheezing., Normal, Disp-1 Each, R-0      fluticasone propionate (FLONASE) 50 mcg/actuation nasal spray 2 Sprays by Both Nostrils route daily. , Normal, Disp-1 Bottle, R-0      lurasidone (LATUDA) 40 mg tab tablet Take  by mouth. Unclear dosage. -RES   Indications: bipolar depression, Historical Med      divalproex ER (Depakote ER) 250 mg ER tablet Take 250 mg by mouth daily. Indications: bipolar II, Historical Med       !! - Potential duplicate medications found. Please discuss with provider.       1.   2.     Follow-up Information     Follow up With Specialties Details Why Contact Info    Chavez Steward MD Family Medicine Call in 2 days update your doctor on how you are feeling, schedule follow up as advised [place]  [address] [facility] [phone] [address] [facility] EMERGENCY DEPT Emergency Medicine Go to  As needed, If symptoms worsen [address]  [address] [facility]  [phone]        3. Return to ED if worse       I, Denise Rudolph MD, am the attending of record for this patient encounter. Diagnosis     Clinical Impression:   1. Pneumonia due to COVID-19 virus    2. Cough        Attestation:  I personally performed the services described in this documentation on this date 6/8/2022 for patient Dariel Posey.   Faith Clark MD

## 2022-06-09 NOTE — ED TRIAGE NOTES
Patient presents to triage ambulatory complaining of a non-productive cough which is causing chest pain. Patient reports a PMH of bronchitis and reports her current symptoms  are similar to when her bronchitis flares. Patient is speaking in complete sentences, with room air stats above 95%. Patient's color is appropriate foe ethnicity.

## 2022-06-10 ENCOUNTER — PATIENT OUTREACH (OUTPATIENT)
Dept: CASE MANAGEMENT | Age: 32
End: 2022-06-10

## 2022-06-10 DIAGNOSIS — J20.9 ACUTE BRONCHITIS, UNSPECIFIED ORGANISM: ICD-10-CM

## 2022-06-10 RX ORDER — PREDNISONE 20 MG/1
20 TABLET ORAL 2 TIMES DAILY
Qty: 10 TABLET | Refills: 0 | Status: CANCELLED | OUTPATIENT
Start: 2022-06-10

## 2022-06-14 RX ORDER — BENZONATATE 200 MG/1
200 CAPSULE ORAL
Qty: 30 CAPSULE | Refills: 0 | Status: SHIPPED | OUTPATIENT
Start: 2022-06-14 | End: 2022-06-21

## 2022-06-27 ENCOUNTER — PATIENT OUTREACH (OUTPATIENT)
Dept: CASE MANAGEMENT | Age: 32
End: 2022-06-27

## 2022-06-27 NOTE — PROGRESS NOTES
Patient resolved from [address] Transitions episode on 6/27/22. Discussed COVID-19 related testing which was available at this time. Test results were positive. Patient informed of results, if available? yes     Patient/family has been provided the following resources and education related to COVID-19:                         Signs, symptoms and red flags related to COVID-19            [facility] exposure and quarantine guidelines            [facility] exposure contact - [phone]            Contact for their local Department of Health                 Patient currently reports that the following symptoms have improved:  no new symptoms and no worsening symptoms. No further outreach scheduled with this CTN/ACM/LPN/HC/ MA. Episode of Care resolved. Patient has this CTN/ACM/LPN/HC/MA contact information if future needs arise.

## 2022-07-12 ENCOUNTER — OFFICE VISIT (OUTPATIENT)
Dept: INTERNAL MEDICINE CLINIC | Age: 32
End: 2022-07-12
Payer: MEDICAID

## 2022-07-12 VITALS
RESPIRATION RATE: 18 BRPM | WEIGHT: 254 LBS | BODY MASS INDEX: 42.32 KG/M2 | TEMPERATURE: 98.3 F | SYSTOLIC BLOOD PRESSURE: 105 MMHG | HEART RATE: 96 BPM | OXYGEN SATURATION: 99 % | DIASTOLIC BLOOD PRESSURE: 57 MMHG | HEIGHT: 65 IN

## 2022-07-12 DIAGNOSIS — F31.81 BIPOLAR II DISORDER (HCC): ICD-10-CM

## 2022-07-12 DIAGNOSIS — M25.512 ACUTE PAIN OF LEFT SHOULDER: ICD-10-CM

## 2022-07-12 DIAGNOSIS — Z00.01 ABNORMAL WELLNESS EXAM: Primary | ICD-10-CM

## 2022-07-12 DIAGNOSIS — L30.9 DERMATITIS: ICD-10-CM

## 2022-07-12 DIAGNOSIS — Z91.14 NONCOMPLIANCE WITH MEDICATION REGIMEN: ICD-10-CM

## 2022-07-12 DIAGNOSIS — Z79.899 PRESCRIPTION MEDICATION STARTED: ICD-10-CM

## 2022-07-12 PROCEDURE — 99213 OFFICE O/P EST LOW 20 MIN: CPT | Performed by: FAMILY MEDICINE

## 2022-07-12 PROCEDURE — 99395 PREV VISIT EST AGE 18-39: CPT | Performed by: FAMILY MEDICINE

## 2022-07-12 RX ORDER — CLOTRIMAZOLE AND BETAMETHASONE DIPROPIONATE 10; .64 MG/G; MG/G
CREAM TOPICAL
Qty: 15 G | Refills: 0 | Status: SHIPPED | OUTPATIENT
Start: 2022-07-12 | End: 2022-10-04

## 2022-07-12 RX ORDER — LIDOCAINE 50 MG/G
PATCH TOPICAL
Qty: 1 EACH | Refills: 0 | Status: SHIPPED | OUTPATIENT
Start: 2022-07-12 | End: 2022-07-18 | Stop reason: SDUPTHER

## 2022-07-12 NOTE — PROGRESS NOTES
SPORTS MEDICINE AND PRIMARY CARE  Dyan Lesches. MD Sarah  [address] [address]    Chief Complaint   Patient presents with    Physical     PHQ 21     Skin Problem     Feet, dry patches, itching     Shoulder Pain     Left shoulder, for several weeks    Medication Evaluation     Latuda       SUBJECTIVE:    Roseline Wade is a 33 y.o. female for cpe. Also has multiple concerns. Works at call center    Left shoulder pain, throbbing x 2 wks, worse at night  No injury  Saw urgent care  Given muscle relaxer, bengay  Movement has improved    Dry very itchy patches on feet  Saw dr, given steroid creme   No chronic skin condition history    [facility] [address] [facility] better  Symptoms are severe off medication  No suicidal ideation      Current Outpatient Medications   Medication Sig Dispense Refill    lurasidone (LATUDA) 40 mg tab tablet Take 1 Tablet by mouth daily (with breakfast). Unclear dosage. -RES   Indications: bipolar depression 30 Tablet 2    lidocaine (LIDODERM) 5 % Apply patch to the affected area for 12 hours a day and remove for 12 hours a day. 1 Each 0    clotrimazole-betamethasone (LOTRISONE) topical cream Apply bid for up to 2 wks 15 g 0    acetaminophen (Tylenol Extra Strength) 500 mg tablet Take 2 Tablets by mouth every six (6) hours as needed for Pain. 20 Tablet 0    cyclobenzaprine (FLEXERIL) 10 mg tablet Take 1 Tablet by mouth three (3) times daily as needed for Muscle Spasm(s). 15 Tablet 0    albuterol (PROVENTIL HFA, VENTOLIN HFA, PROAIR HFA) 90 mcg/actuation inhaler Take 2 Puffs by inhalation every four (4) hours as needed for Wheezing. 1 Each 0    albuterol (PROVENTIL HFA, VENTOLIN HFA, PROAIR HFA) 90 mcg/actuation inhaler Take 2 Puffs by inhalation every four (4) hours as needed for Wheezing. 1 Each 0    diclofenac EC (VOLTAREN) 75 mg EC tablet Take 1 Tablet by mouth two (2) times a day.  (Patient not taking: Reported on 7/12/2022) 30 Tablet 0    multivitamin (ONE A DAY) tablet Take 1 Tablet by mouth daily. (Patient not taking: Reported on 7/12/2022) 30 Tablet 2    albuterol (PROVENTIL HFA, VENTOLIN HFA, PROAIR HFA) 90 mcg/actuation inhaler Take 2 Puffs by inhalation every six (6) hours as needed for Wheezing. (Patient not taking: Reported on 7/12/2022) 18 g 2    ondansetron hcl (Zofran) 4 mg tablet Take 1 Tablet by mouth every eight (8) hours as needed for Nausea. (Patient not taking: Reported on 7/12/2022) 20 Tablet 0    fluticasone propionate (FLONASE) 50 mcg/actuation nasal spray 2 Sprays by Both Nostrils route daily. (Patient not taking: Reported on 7/12/2022) 1 Bottle 0    divalproex ER (Depakote ER) 250 mg ER tablet Take 250 mg by mouth daily.  Indications: bipolar II (Patient not taking: Reported on 7/12/2022)       Past Medical History:   Diagnosis Date    Abnormal Pap smear     Anemia NEC     per pt takes iron PO    Asthma     bronchitis    Breastfeeding (infant)     Complication of anesthesia     never had    Depression     HX OTHER MEDICAL     Pregnant    Mild episode of recurrent major depressive disorder ([facility] [address] 4/21/2020     Past Surgical History:   Procedure Laterality Date    HX GYN      c section    HX TUBAL LIGATION  12/14/2021     No Known Allergies    REVIEW OF SYSTEMS:  General: negative for - chills or fever  ENT: negative for - headaches, nasal congestion, tinnitus, hearing loss, vision changes, sore throat  Respiratory: negative for - cough, hemoptysis, shortness of breath or wheezing  Cardiovascular : negative for - chest pain, edema, palpitations or shortness of breath  Gastrointestinal: negative for - abdominal pain, blood in stools, heartburn or nausea/vomiting, diarrhea, constipation  Genito-Urinary: no dysuria, trouble voiding, hematuria   Musculoskeletal: + joint pain, joint stiffness ,negative for - gait disturbance, joint swelling, muscle aches  Neurological: no TIA or stroke symptoms  Hematologic: no bruises, no bleeding, no swollen glands  Integument: +rash; no lumps, mole changes, nail changes or   Endocrine:no malaise/lethargy or unexpected weight changes      Social History     Socioeconomic History    Marital status:    Tobacco Use    Smoking status: Current Every Day Smoker     Packs/day: 1.00     Years: 3.00     Pack years: 3.00    Smokeless tobacco: Never Used    Tobacco comment: black and milds   Vaping Use    Vaping Use: Never used   Substance and Sexual Activity    Alcohol use: Not Currently    Drug use: No    Sexual activity: Yes     Partners: Male     Birth control/protection: None     Comment: tubes tied 12/2021     Family History   Problem Relation Age of Onset    Cancer Maternal Grandfather     Diabetes Paternal Grandmother     Diabetes Father     Hypertension Father        OBJECTIVE:     Visit Vitals  BP (!) 105/57 (BP 1 Location: Left arm, BP Patient Position: Sitting, BP Cuff Size: Adult)   Pulse 96   Temp 98.3 °F (36.8 °C) (Oral)   Resp 18   Ht 5' 5\" (1.651 m)   Wt 254 lb (115.2 kg)   SpO2 99%   BMI 42.27 kg/m²   General appearance: alert, cooperative, no distress, appears stated age  Head: Normocephalic, without obvious abnormality, atraumatic  Eyes: conjunctivae/corneas clear. PERRL, EOM's intact. Ears: normal TM's and external ear canals AU  Nose: Nares normal. Septum midline. Mucosa normal. No drainage or sinus tenderness. Throat: Lips, mucosa, and tongue normal. Teeth and gums normal  Neck: supple, symmetrical, trachea midline, no adenopathy, thyroid: not enlarged, symmetric, no tenderness/mass/nodules, no carotid bruit and no JVD  Back: negative, symmetric, no curvature. ROM normal. No CVA tenderness. Lungs: clear to auscultation bilaterally  Breasts: not examined  Heart: regular rate and rhythm, S1, S2 normal, no murmur, click, rub or gallop  Abdomen: soft, non-tender.  Bowel sounds normal. No masses,  no organomegaly  Pelvic: deferred  Extremities: extremities normal,  no cyanosis or edema, no left shoulder tenderness, modest ROM deficit at left shoulder  Pulses: 2+ and symmetric  Skin: Skin color, texture, turgor normal. Dry appearing macules on feet  Lymph nodes: Cervical, supraclavicular, and axillary nodes normal.  Neurologic: Alert and oriented , normal strength and tone. Normal symmetric reflexes. Normal coordination and gait      ASSESSMENT/PLAN:   1. Abnormal wellness exam    2. Acute pain of left shoulder -consider strain, impingement,oa; add lidoderm, continue muscle relaxer, nsaid; consider PT   3. Dermatitis - trial of antifungal/steroid combo topical   4. Bipolar II disorder ([facility] [address] -noncompliant with treatment; restart latuda; pt will reconnect with psychiatry and behavorial health   5. Noncompliance with medication regimen    6. Prescription medication started      . Orders Placed This Encounter    HEMOGLOBIN A1C WITH EAG    CBC WITH AUTOMATED DIFF    METABOLIC PANEL, COMPREHENSIVE    LIPID PANEL    TSH 3RD GENERATION    URINALYSIS W/ RFLX MICROSCOPIC    lurasidone (LATUDA) 40 mg tab tablet    lidocaine (LIDODERM) 5 %    clotrimazole-betamethasone (LOTRISONE) topical cream           I have discussed the diagnosis with the patient and the intended plan as seen in the  orders above. The patient understands and agrees with the plan. The patient has   received an after visit summary. Questions were answered concerning  future plans  Patient labs and/or xrays were reviewed as available. Past records were reviewed as available. Counseled regarding diet, exercise and healthy lifestyle          Advised patient to proceed to urgent care, call back or return to office if symptoms develop/worsen/change/persist.  Discussed expected course/resolution/complications of diagnosis in detail with patient.      Medication risks/benefits/costs/interactions/alternatives discussed with patient    Signed,  Mark Bertrand M.D.      This note was created using voice recognition software.   Edits have been made but syntax errors might exist.

## 2022-07-12 NOTE — PROGRESS NOTES
Identified pt with two pt identifiers(name and ). Chief Complaint   Patient presents with    Physical     PHQ 21     Skin Problem     Feet, dry patches, itching     Shoulder Pain     Left shoulder, for several weeks    Medication Evaluation     Latuda      Vitals:    22 1630   BP: (!) 105/57   Pulse: 96   Resp: 18   Temp: 98.3 °F (36.8 °C)   TempSrc: Oral   SpO2: 99%   Weight: 254 lb (115.2 kg)   Height: 5' 5\" (1.651 m)   PainSc:   8   PainLoc: Shoulder      Health Maintenance Due   Topic    Hepatitis C Screening     COVID-19 Vaccine (1)    Pneumococcal 0-64 years (1 - PCV)    Depression Monitoring     DTaP/Tdap/Td series (1 - Tdap)    Cervical cancer screen     Medicare Yearly Exam        Depression Screening:  :     3 most recent PHQ Screens 2022   Little interest or pleasure in doing things Nearly every day   Feeling down, depressed, irritable, or hopeless Nearly every day   Total Score PHQ 2 6   Trouble falling or staying asleep, or sleeping too much Nearly every day   Feeling tired or having little energy Nearly every day   Poor appetite, weight loss, or overeating More than half the days   Feeling bad about yourself - or that you are a failure or have let yourself or your family down Nearly every day   Trouble concentrating on things such as school, work, reading, or watching TV Nearly every day   Moving or speaking so slowly that other people could have noticed; or the opposite being so fidgety that others notice Not at all   Thoughts of being better off dead, or hurting yourself in some way Several days   PHQ 9 Score 21   How difficult have these problems made it for you to do your work, take care of your home and get along with others Very difficult        Fall Risk Assessment:  :     No flowsheet data found. Abuse Screening:  :     No flowsheet data found. Coordination of Care Questionnaire:  :     1. Have you been to the ER, urgent care clinic since your last visit? Hospitalized since your last visit? [facility] July 2022  Pulled shoulder muscle     2. Have you seen or consulted any other health care providers outside of the [address] [place] [address] since your last visit? Include any pap smears or colon screening.   Dr. Flako Sousa

## 2022-07-14 LAB
ALBUMIN SERPL-MCNC: 4.1 G/DL (ref 3.8–4.8)
ALBUMIN/GLOB SERPL: 1.7 {RATIO} (ref 1.2–2.2)
ALP SERPL-CCNC: 119 IU/L (ref 44–121)
ALT SERPL-CCNC: 12 IU/L (ref 0–32)
APPEARANCE UR: CLEAR
AST SERPL-CCNC: 14 IU/L (ref 0–40)
BASOPHILS # BLD AUTO: 0.1 X10E3/UL (ref 0–0.2)
BASOPHILS NFR BLD AUTO: 1 %
BILIRUB SERPL-MCNC: <0.2 MG/DL (ref 0–1.2)
BILIRUB UR QL STRIP: NEGATIVE
BUN SERPL-MCNC: 8 MG/DL (ref 6–20)
BUN/CREAT SERPL: 11 (ref 9–23)
CALCIUM SERPL-MCNC: 9.2 MG/DL (ref 8.7–10.2)
CHLORIDE SERPL-SCNC: 101 MMOL/L (ref 96–106)
CHOLEST SERPL-MCNC: 164 MG/DL (ref 100–199)
CO2 SERPL-SCNC: 24 MMOL/L (ref 20–29)
COLOR UR: YELLOW
CREAT SERPL-MCNC: 0.7 MG/DL (ref 0.57–1)
EGFR: 119 ML/MIN/1.73
EOSINOPHIL # BLD AUTO: 0.1 X10E3/UL (ref 0–0.4)
EOSINOPHIL NFR BLD AUTO: 1 %
ERYTHROCYTE [DISTWIDTH] IN BLOOD BY AUTOMATED COUNT: 14.3 % (ref 11.7–15.4)
EST. AVERAGE GLUCOSE BLD GHB EST-MCNC: 117 MG/DL
GLOBULIN SER CALC-MCNC: 2.4 G/DL (ref 1.5–4.5)
GLUCOSE SERPL-MCNC: 80 MG/DL (ref 65–99)
GLUCOSE UR QL STRIP: NEGATIVE
HBA1C MFR BLD: 5.7 % (ref 4.8–5.6)
HCT VFR BLD AUTO: 39.7 % (ref 34–46.6)
HDLC SERPL-MCNC: 26 MG/DL
HGB BLD-MCNC: 12.8 G/DL (ref 11.1–15.9)
HGB UR QL STRIP: NEGATIVE
IMM GRANULOCYTES # BLD AUTO: 0 X10E3/UL (ref 0–0.1)
IMM GRANULOCYTES NFR BLD AUTO: 0 %
KETONES UR QL STRIP: NEGATIVE
LDLC SERPL CALC-MCNC: 107 MG/DL (ref 0–99)
LEUKOCYTE ESTERASE UR QL STRIP: NEGATIVE
LYMPHOCYTES # BLD AUTO: 3.6 X10E3/UL (ref 0.7–3.1)
LYMPHOCYTES NFR BLD AUTO: 30 %
MCH RBC QN AUTO: 27.2 PG (ref 26.6–33)
MCHC RBC AUTO-ENTMCNC: 32.2 G/DL (ref 31.5–35.7)
MCV RBC AUTO: 85 FL (ref 79–97)
MICRO URNS: NORMAL
MONOCYTES # BLD AUTO: 0.9 X10E3/UL (ref 0.1–0.9)
MONOCYTES NFR BLD AUTO: 7 %
NEUTROPHILS # BLD AUTO: 7.4 X10E3/UL (ref 1.4–7)
NEUTROPHILS NFR BLD AUTO: 61 %
NITRITE UR QL STRIP: NEGATIVE
PH UR STRIP: 7 [PH] (ref 5–7.5)
PLATELET # BLD AUTO: 227 X10E3/UL (ref 150–450)
POTASSIUM SERPL-SCNC: 4.2 MMOL/L (ref 3.5–5.2)
PROT SERPL-MCNC: 6.5 G/DL (ref 6–8.5)
PROT UR QL STRIP: NEGATIVE
RBC # BLD AUTO: 4.7 X10E6/UL (ref 3.77–5.28)
SODIUM SERPL-SCNC: 137 MMOL/L (ref 134–144)
SP GR UR STRIP: 1.02 (ref 1–1.03)
TRIGL SERPL-MCNC: 173 MG/DL (ref 0–149)
TSH SERPL DL<=0.005 MIU/L-ACNC: 1.43 UIU/ML (ref 0.45–4.5)
UROBILINOGEN UR STRIP-MCNC: 0.2 MG/DL (ref 0.2–1)
VLDLC SERPL CALC-MCNC: 31 MG/DL (ref 5–40)
WBC # BLD AUTO: 12 X10E3/UL (ref 3.4–10.8)

## 2022-07-21 ENCOUNTER — VIRTUAL VISIT (OUTPATIENT)
Dept: SOCIAL WORK | Age: 32
End: 2022-07-21
Payer: MEDICAID

## 2022-07-21 DIAGNOSIS — F31.81 BIPOLAR II DISORDER (HCC): Primary | ICD-10-CM

## 2022-07-21 DIAGNOSIS — F41.9 ANXIETY: ICD-10-CM

## 2022-07-21 PROCEDURE — 90837 PSYTX W PT 60 MINUTES: CPT | Performed by: SOCIAL WORKER

## 2022-07-21 NOTE — PROGRESS NOTES
History of Present Illness: Stella De Sanitago is a 28 y.o. female who presents with symptoms of depression, agitation and anxiety     Duration of session: 56+min     Mental Status exam:           Sensorium  oriented to time, place and person   Relations cooperative   Appearance:  age appropriate   Motor Behavior:  within normal limits   Speech:  normal pitch and normal volume   Thought Process: goal directed   Thought Content free of delusions, free of hallucinations and not internally preoccupied    Suicidal ideations none   Homicidal ideations none   Mood:  stable   Affect:  full range   Memory recent  adequate   Memory remote:  adequate   Concentration:  adequate   Abstraction:  abstract   Insight:  good   Reliability good   Judgment:  good            DIAGNOSIS AND IMPRESSION:        Axis I: bipolar ii and Anxiety Disorder NOS  Axis II: No diagnosis  Axis III:           Past Medical History:   Diagnosis Date    Abnormal Pap smear      Anemia NEC       per pt takes iron PO    Asthma       bronchitis    Breastfeeding (infant)      Complication of anesthesia       never had    Depression      HX OTHER MEDICAL       Pregnant    Mild episode of recurrent major depressive disorder ([facility] [address] 2020      Axis IV: Problems with primary support group, Problems related to social environment, Occupational problems and Other psychosocial or environmental problems  Axis V:  61-70 mild symptoms           Client presents via doxy vv for f/u, presents in fair space, stable, reports depressive symptoms, crying, isolative behavior, anxiety. Just getting back on meds, continues to learn about self-care and patterns. Follow up next month.         Stella De Santiago (: 1990) is a 33 y.o. female, established patient, here for evaluation of the following chief complaint(s):   Follow-up and Psychotherapy (doxy)       ASSESSMENT/PLAN:  Below is the assessment and plan developed based on review of pertinent history, labs, studies, and medications. 1. Bipolar II disorder ([facility] [address]  2. Anxiety      Return in about 4 weeks (around 8/18/2022). SUBJECTIVE/OBJECTIVE:  HPI    Review of Systems     No data recorded     Physical Exam        Rainer Martínez was evaluated through a synchronous (real-time) audio-video encounter. The patient (or guardian if applicable) is aware that this is a billable service, which includes applicable co-pays. This Virtual Visit was conducted with patient's (and/or legal guardian's) consent. The visit was conducted pursuant to the emergency declaration under the [address] [place] [address], [address] [place] [address] authority and the [employer] and [employer] General Act. Patient identification was verified, and a caregiver was present when appropriate. The patient was located at: Home: [address]  The provider was located at: Home: [unfilled]       An electronic signature was used to authenticate this note.   -- Gertrudis Bentley LCSW

## 2022-08-24 ENCOUNTER — VIRTUAL VISIT (OUTPATIENT)
Dept: SOCIAL WORK | Age: 32
End: 2022-08-24
Payer: MEDICAID

## 2022-08-24 DIAGNOSIS — F31.81 BIPOLAR II DISORDER (HCC): Primary | ICD-10-CM

## 2022-08-24 DIAGNOSIS — F41.9 ANXIETY: ICD-10-CM

## 2022-08-24 PROCEDURE — 90832 PSYTX W PT 30 MINUTES: CPT | Performed by: SOCIAL WORKER

## 2022-08-24 NOTE — PROGRESS NOTES
History of Present Illness: Chloé Mancilla is a 28 y.o. female who presents with symptoms of depression, agitation and anxiety     Duration of session: 20 min     Mental Status exam:           Sensorium  oriented to time, place and person   Relations cooperative   Appearance:  age appropriate   Motor Behavior:  within normal limits   Speech:  normal pitch and normal volume   Thought Process: goal directed   Thought Content free of delusions, free of hallucinations and not internally preoccupied    Suicidal ideations none   Homicidal ideations none   Mood:  stable   Affect:  full range   Memory recent  adequate   Memory remote:  adequate   Concentration:  adequate   Abstraction:  abstract   Insight:  good   Reliability good   Judgment:  good            DIAGNOSIS AND IMPRESSION:        Axis I: bipolar ii and Anxiety Disorder NOS  Axis II: No diagnosis  Axis III:           Past Medical History:   Diagnosis Date    Abnormal Pap smear      Anemia NEC       per pt takes iron PO    Asthma       bronchitis    Breastfeeding (infant)      Complication of anesthesia       never had    Depression      HX OTHER MEDICAL       Pregnant    Mild episode of recurrent major depressive disorder ([facility] [address] 2020      Axis IV: Problems with primary support group, Problems related to social environment, Occupational problems and Other psychosocial or environmental problems  Axis V:  61-70 mild symptoms           Client presents via doxy vv for f/u, presents in good space, stable. Has new job wfh, stressors but handling them well, symptoms not clinical but environmental stressors. Marriage dynamic improving. Follow up next month.        Chloé Mancilla (: 1990) is a 33 y.o. female, established patient, here for evaluation of the following chief complaint(s):   Follow-up and Psychotherapy (doxy)       ASSESSMENT/PLAN:  Below is the assessment and plan developed based on review of pertinent history, labs, studies, and medications. 1. Bipolar II disorder ([facility] [address]  2. Anxiety      Return in about 4 weeks (around 9/21/2022). Jim Ramirez, was evaluated through a synchronous (real-time) audio-video encounter. The patient (or guardian if applicable) is aware that this is a billable service, which includes applicable co-pays. This Virtual Visit was conducted with patient's (and/or legal guardian's) consent. The visit was conducted pursuant to the emergency declaration under the [address] [place] [address], [address] [place] [address] authority and the [employer] and [employer] General Act. Patient identification was verified, and a caregiver was present when appropriate. The patient was located at: Home: [address]  The provider was located at: Home: [unfilled]       An electronic signature was used to authenticate this note.   -- Yulissa Chi LCSW

## 2022-09-11 NOTE — ED NOTES
I have reviewed discharge instructions with the patient. The patient verbalized understanding.
Elaina

## 2022-09-28 ENCOUNTER — VIRTUAL VISIT (OUTPATIENT)
Dept: SOCIAL WORK | Age: 32
End: 2022-09-28
Payer: MEDICARE

## 2022-09-28 DIAGNOSIS — F31.81 BIPOLAR II DISORDER (HCC): Primary | ICD-10-CM

## 2022-09-28 DIAGNOSIS — F41.9 ANXIETY: ICD-10-CM

## 2022-09-28 PROCEDURE — 90837 PSYTX W PT 60 MINUTES: CPT | Performed by: SOCIAL WORKER

## 2022-09-28 NOTE — PROGRESS NOTES
History of Present Illness: Jim Shin is a 28 y.o. female who presents with symptoms of depression, agitation and anxiety     Duration of session: 57 min     Mental Status exam:           Sensorium  oriented to time, place and person   Relations cooperative   Appearance:  age appropriate   Motor Behavior:  within normal limits   Speech:  normal pitch and normal volume   Thought Process: goal directed   Thought Content free of delusions, free of hallucinations and not internally preoccupied    Suicidal ideations none   Homicidal ideations none   Mood:  stable   Affect:  full range   Memory recent  adequate   Memory remote:  adequate   Concentration:  adequate   Abstraction:  abstract   Insight:  good   Reliability good   Judgment:  good            DIAGNOSIS AND IMPRESSION:        Axis I: bipolar ii and Anxiety Disorder NOS  Axis II: No diagnosis  Axis III:           Past Medical History:   Diagnosis Date    Abnormal Pap smear      Anemia NEC       per pt takes iron PO    Asthma       bronchitis    Breastfeeding (infant)      Complication of anesthesia       never had    Depression      HX OTHER MEDICAL       Pregnant    Mild episode of recurrent major depressive disorder ([facility] [address] 2020      Axis IV: Problems with primary support group, Problems related to social environment, Occupational problems and Other psychosocial or environmental problems  Axis V:  61-70 mild symptoms           Client presents via doxy vv for f/u, presents in fair but anxious space. Reports recent current events of shootings, world events and a family friend found her son dead from suicide from bullying. Cl's daughter experiencing some bullying. Processed feelings of anxiety and stress concerning her children's safety. Follow up two weeks.       Jim Shin (: 1990) is a 33 y.o. female, established patient, here for evaluation of the following chief complaint(s):   Follow-up and Psychotherapy (doxy) ASSESSMENT/PLAN:  Below is the assessment and plan developed based on review of pertinent history, labs, studies, and medications. 1. Bipolar II disorder ([facility] [address]  2. Anxiety      Return in about 2 weeks (around 10/12/2022). SUBJECTIVE/OBJECTIVE    Billy Ogles, was evaluated through a synchronous (real-time) audio-video encounter. The patient (or guardian if applicable) is aware that this is a billable service, which includes applicable co-pays. This Virtual Visit was conducted with patient's (and/or legal guardian's) consent. The visit was conducted pursuant to the emergency declaration under the [facility], [address] [place] [address] authority and the [employer] and [employer] General Act. Patient identification was verified, and a caregiver was present when appropriate. The patient was located at: Home: [address]  The provider was located at: Home: [unfilled]       An electronic signature was used to authenticate this note.   -- Lori Sanchez, CAROL

## 2022-10-04 ENCOUNTER — VIRTUAL VISIT (OUTPATIENT)
Dept: INTERNAL MEDICINE CLINIC | Age: 32
End: 2022-10-04
Payer: MEDICARE

## 2022-10-04 DIAGNOSIS — F31.81 BIPOLAR II DISORDER (HCC): Primary | ICD-10-CM

## 2022-10-04 DIAGNOSIS — R73.09 ELEVATED HEMOGLOBIN A1C: ICD-10-CM

## 2022-10-04 DIAGNOSIS — Z00.00 MEDICARE ANNUAL WELLNESS VISIT, SUBSEQUENT: ICD-10-CM

## 2022-10-04 DIAGNOSIS — Z79.899 MEDICATION MANAGEMENT: ICD-10-CM

## 2022-10-04 DIAGNOSIS — D72.829 LEUKOCYTOSIS, UNSPECIFIED TYPE: ICD-10-CM

## 2022-10-04 PROCEDURE — G9717 DOC PT DX DEP/BP F/U NT REQ: HCPCS | Performed by: FAMILY MEDICINE

## 2022-10-04 PROCEDURE — G0439 PPPS, SUBSEQ VISIT: HCPCS | Performed by: FAMILY MEDICINE

## 2022-10-04 PROCEDURE — 99213 OFFICE O/P EST LOW 20 MIN: CPT | Performed by: FAMILY MEDICINE

## 2022-10-04 PROCEDURE — G8427 DOCREV CUR MEDS BY ELIG CLIN: HCPCS | Performed by: FAMILY MEDICINE

## 2022-10-04 PROCEDURE — G8417 CALC BMI ABV UP PARAM F/U: HCPCS | Performed by: FAMILY MEDICINE

## 2022-10-04 NOTE — PROGRESS NOTES
Isaiah Doyle (: 1990) is a 33 y.o. female, established patient, here for evaluation of the following chief complaint(s):   Medicare wellness and Follow-up       ASSESSMENT/PLAN:  Below is the assessment and plan developed based on review of pertinent history, labs, studies, and medications. 1. Bipolar II disorder (HCC)  -     lurasidone (LATUDA) 40 mg tab tablet; Take 1 Tablet by mouth daily (with breakfast). Unclear dosage. -RES   Indications: bipolar depression, Normal, Disp-30 Tablet, R-11  2. Elevated hemoglobin A1c  -     HEMOGLOBIN A1C WITH EAG; Future  3. Leukocytosis, unspecified type  -     CBC WITH AUTOMATED DIFF; Future  4. Medication management  5. Medicare annual wellness visit, subsequent    I have discussed the diagnosis with the patient and the intended plan as seen in the  orders above. The patient understands and agrees with the plan. All questions the patient posed were answered. Patient labs and/or xrays were reviewed  Past records were reviewed. Advised patient to call back or return to office if symptoms worsen/change/persist.  Discussed expected course/resolution/complications of diagnosis in detail with patient. Medication risks/benefits/costs/interactions/alternatives were reviewed    RTO 3mo    SUBJECTIVE/OBJECTIVE:  HPI  Established patient with past medical history of bipolar disorder and anxiety for Medicare wellness evaluation, follow-up evaluation and refill of Latuda 40 mg. Patient was followed by psychiatrist but got disconnected. She is waiting for an appointment with a new provider. She does continue to follow with her behavioral therapist and she finds their encounters helpful. She is working from home and that has helped her anxiety. 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 Patient is tolerating Latuda. She denies SI suicidal thoughts. She has gained some weight.     Lab review 2022  Elevated A1c-patient denies excessive thirst excessive urination vaginal yeast infections  WBC 12.8-patient had a  COVID infection close to blood draw time    BTL history. This is the Subsequent Medicare Annual Wellness Exam, performed 12 months or more after the Initial AWV or the last Subsequent AWV    I have reviewed the patient's medical history in detail and updated the computerized patient record. Assessment/Plan   Education and counseling provided:  Are appropriate based on today's review and evaluation    1. Bipolar II disorder (HCC)  -     lurasidone (LATUDA) 40 mg tab tablet; Take 1 Tablet by mouth daily (with breakfast). Unclear dosage. -RES   Indications: bipolar depression, Normal, Disp-30 Tablet, R-11  2. Elevated hemoglobin A1c  -     HEMOGLOBIN A1C WITH EAG; Future  3. Leukocytosis, unspecified type  -     CBC WITH AUTOMATED DIFF; Future  4. Medication management  5.  Medicare annual wellness visit, subsequent       Depression Risk Factor Screening     3 most recent PHQ Screens 10/4/2022   Little interest or pleasure in doing things Several days   Feeling down, depressed, irritable, or hopeless Several days   Total Score PHQ 2 2   Trouble falling or staying asleep, or sleeping too much -   Feeling tired or having little energy -   Poor appetite, weight loss, or overeating -   Feeling bad about yourself - or that you are a failure or have let yourself or your family down -   Trouble concentrating on things such as school, work, reading, or watching TV -   Moving or speaking so slowly that other people could have noticed; or the opposite being so fidgety that others notice -   Thoughts of being better off dead, or hurting yourself in some way -   PHQ 9 Score -   How difficult have these problems made it for you to do your work, take care of your home and get along with others -       Alcohol & Drug Abuse Risk Screen    Do you average more than 1 drink per night or more than 7 drinks a week:  No    On any one occasion in the past three months have you have had more than 3 drinks containing alcohol:  No          Functional Ability and Level of Safety    Hearing: Hearing is good. Activities of Daily Living: The home contains: no safety equipment. Patient does total self care      Ambulation: with no difficulty     Fall Risk:  No flowsheet data found. Abuse Screen:  Patient is not abused       Cognitive Screening    Has your family/caregiver stated any concerns about your memory: no     Cognitive Screening: Normal - Verbal Fluency Test    Health Maintenance Due     Health Maintenance Due   Topic Date Due    Hepatitis C Screening  Never done    COVID-19 Vaccine (1) Never done    Pneumococcal 0-64 years (1 - PCV) Never done    DTaP/Tdap/Td series (1 - Tdap) Never done    Flu Vaccine (1) 08/01/2022       Patient Care Team   Patient Care Team:  Magui Rockwell MD as PCP - General (Family Medicine)  Magui Rockwell MD as PCP - [facility] Empaneled Provider  Alice Porras MD as Physician (Obstetrics & Gynecology)    History     Patient Active Problem List   Diagnosis Code    Abdominal pain, epigastric R10.13    False labor O47.9    Severe obesity ([address] E66.01    Excess, menstruation N92.0    Anxiety F41.9    Muscle spasm M62.838    Iron deficiency anemia D50.9    Bipolar II disorder ([address] F31.81     Past Medical History:   Diagnosis Date    Abnormal Pap smear     Anemia NEC     per pt takes iron PO    Asthma     bronchitis    Breastfeeding (infant)     Complication of anesthesia     never had    Depression     HX OTHER MEDICAL     Pregnant    Mild episode of recurrent major depressive disorder ([address] 4/21/2020      Past Surgical History:   Procedure Laterality Date    HX GYN      c section    HX TUBAL LIGATION  12/14/2021     Current Outpatient Medications   Medication Sig Dispense Refill    lurasidone (LATUDA) 40 mg tab tablet Take 1 Tablet by mouth daily (with breakfast). Unclear dosage.  -RES   Indications: bipolar depression 30 Tablet 11    albuterol (PROVENTIL HFA, VENTOLIN HFA, PROAIR HFA) 90 mcg/actuation inhaler Take 2 Puffs by inhalation every four (4) hours as needed for Wheezing. 1 Each 0    multivitamin (ONE A DAY) tablet Take 1 Tablet by mouth daily. 30 Tablet 11    lidocaine (LIDODERM) 5 % Apply patch to the affected area for 12 hours a day and remove for 12 hours a day. 1 Each 0    fluticasone propionate (FLONASE) 50 mcg/actuation nasal spray 2 Sprays by Both Nostrils route daily. (Patient not taking: No sig reported) 1 Bottle 0     No Known Allergies    Family History   Problem Relation Age of Onset    Cancer Maternal Grandfather     Diabetes Paternal Grandmother     Diabetes Father     Hypertension Father      Social History     Tobacco Use    Smoking status: Every Day     Packs/day: 1.00     Years: 3.00     Pack years: 3.00     Types: Cigarettes    Smokeless tobacco: Never    Tobacco comments:     black and nayan   Substance Use Topics    Alcohol use: Not Currently         Sharon Faye MD     Review of Systems   Endocrine: Negative for polydipsia, polyphagia and polyuria. Psychiatric/Behavioral:  Positive for dysphoric mood. Negative for self-injury and suicidal ideas. The patient is nervous/anxious.      Ros per hpi    No data recorded     Physical Exam    [INSTRUCTIONS:  \"[x]\" Indicates a positive item  \"[]\" Indicates a negative item  -- DELETE ALL ITEMS NOT EXAMINED]    Constitutional: [x] Appears well-developed and well-nourished [x] No apparent distress      [] Abnormal -     Mental status: [x] Alert and awake  [x] Oriented to person/place/time [x] Able to follow commands    [] Abnormal -     Eyes:   EOM    [x]  Normal    [] Abnormal -   Sclera  [x]  Normal    [] Abnormal -          Discharge [x]  None visible   [] Abnormal -     HENT: [x] Normocephalic, atraumatic  [] Abnormal -   [x] Mouth/Throat: Mucous membranes are moist    External Ears [x] Normal  [] Abnormal -    Neck: [x] No visualized mass [] Abnormal - Pulmonary/Chest: [x] Respiratory effort normal   [x] No visualized signs of difficulty breathing or respiratory distress        [] Abnormal -      Musculoskeletal:   [] Normal gait with no signs of ataxia         [x] Normal range of motion of neck        [] Abnormal -     Neurological:        [x] No Facial Asymmetry (Cranial nerve 7 motor function) (limited exam due to video visit)          [x] No gaze palsy        [] Abnormal -          Skin:        [x] No significant exanthematous lesions or discoloration noted on facial skin         [] Abnormal -            Psychiatric:       [x] Normal Affect [] Abnormal -        [x] No Hallucinations    Other pertinent observable physical exam findings:-        Meng Green, was evaluated through a synchronous (real-time) audio-video encounter. The patient (or guardian if applicable) is aware that this is a billable service, which includes applicable co-pays. This Virtual Visit was conducted with patient's (and/or legal guardian's) consent. The visit was conducted pursuant to the emergency declaration under the [address] [place] [address] authority and the [employer] and [employer] General Act. Patient identification was verified, and a caregiver was present when appropriate. The patient was located at: Home: [address]  [address] [facility]  The provider was located at: Facility (Appt Department): [address] [facility] [address]       An electronic signature was used to authenticate this note.   -- Chandler Hernandez MD

## 2022-10-04 NOTE — PROGRESS NOTES
VV-Pt is aware of billable appt depending on insurance plan. Preferred number [phone]  Pt verbally agrees to labs, orders, and others to be mailed to confirmed home address. Identified pt with two pt identifiers(name and ). Reviewed record in preparation for visit and have obtained necessary documentation. All patient medications has been reviewed. Chief Complaint   Patient presents with    Follow-up       Health Maintenance Due   Topic    Hepatitis C Screening     COVID-19 Vaccine (1)    Pneumococcal 0-64 years (1 - PCV)    DTaP/Tdap/Td series (1 - Tdap)    Flu Vaccine (1)    Medicare Yearly Exam      Health Maintenance Review: Patient reminded of \"due or due soon\" health maintenance. I have asked the patient to contact his/her primary care provider (PCP) for follow-up on his/her health maintenance. Wt Readings from Last 3 Encounters:   22 254 lb (115.2 kg)   22 246 lb 11.1 oz (111.9 kg)   22 220 lb (99.8 kg)     Temp Readings from Last 3 Encounters:   22 98.3 °F (36.8 °C) (Oral)   22 98.3 °F (36.8 °C)   22 98.3 °F (36.8 °C)     BP Readings from Last 3 Encounters:   22 (!) 105/57   22 124/85   22 (!) 125/97     Pulse Readings from Last 3 Encounters:   22 96   22 97   22 93         Coordination of Care Questionnaire:   1) Have you been to an emergency room, urgent care, or hospitalized since your last visit?   no       2. Have seen or consulted any other health care provider since your last visit?  NO

## 2023-01-31 ENCOUNTER — HOSPITAL ENCOUNTER (EMERGENCY)
Age: 33
Discharge: HOME | End: 2023-01-31
Attending: EMERGENCY MEDICINE
Payer: MEDICAID

## 2023-01-31 VITALS
HEART RATE: 98 BPM | OXYGEN SATURATION: 98 % | DIASTOLIC BLOOD PRESSURE: 79 MMHG | TEMPERATURE: 98.1 F | SYSTOLIC BLOOD PRESSURE: 127 MMHG | WEIGHT: 230 LBS | HEIGHT: 65 IN | BODY MASS INDEX: 38.32 KG/M2 | RESPIRATION RATE: 16 BRPM

## 2023-01-31 DIAGNOSIS — J20.9 ACUTE BRONCHITIS, UNSPECIFIED ORGANISM: ICD-10-CM

## 2023-01-31 DIAGNOSIS — J06.9 VIRAL URI WITH COUGH: Primary | ICD-10-CM

## 2023-01-31 DIAGNOSIS — Z20.822 PERSON UNDER INVESTIGATION FOR COVID-19: ICD-10-CM

## 2023-01-31 LAB
FLUAV AG NPH QL IA: NEGATIVE
FLUBV AG NOSE QL IA: NEGATIVE
SARS-COV-2 RNA RESP QL NAA+PROBE: NOT DETECTED
SOURCE, COVRS: NORMAL

## 2023-01-31 PROCEDURE — 99283 EMERGENCY DEPT VISIT LOW MDM: CPT

## 2023-01-31 PROCEDURE — 87804 INFLUENZA ASSAY W/OPTIC: CPT

## 2023-01-31 PROCEDURE — 74011250637 HC RX REV CODE- 250/637: Performed by: EMERGENCY MEDICINE

## 2023-01-31 PROCEDURE — U0005 INFEC AGEN DETEC AMPLI PROBE: HCPCS

## 2023-01-31 RX ORDER — PSEUDOEPHEDRINE HCL 120 MG/1
120 TABLET, FILM COATED, EXTENDED RELEASE ORAL
Qty: 20 TABLET | Refills: 0 | Status: SHIPPED | OUTPATIENT
Start: 2023-01-31

## 2023-01-31 RX ORDER — LORATADINE 10 MG/1
10 TABLET ORAL DAILY
Qty: 20 TABLET | Refills: 0 | Status: SHIPPED | OUTPATIENT
Start: 2023-01-31

## 2023-01-31 RX ORDER — ALBUTEROL SULFATE 90 UG/1
2 AEROSOL, METERED RESPIRATORY (INHALATION)
Qty: 1 EACH | Refills: 0 | Status: SHIPPED | OUTPATIENT
Start: 2023-01-31

## 2023-01-31 RX ORDER — PREDNISONE 20 MG/1
60 TABLET ORAL DAILY
Qty: 15 TABLET | Refills: 0 | Status: SHIPPED | OUTPATIENT
Start: 2023-01-31 | End: 2023-02-05

## 2023-01-31 RX ORDER — BUTALBITAL, ACETAMINOPHEN AND CAFFEINE 50; 325; 40 MG/1; MG/1; MG/1
2 TABLET ORAL
Status: COMPLETED | OUTPATIENT
Start: 2023-01-31 | End: 2023-01-31

## 2023-01-31 RX ORDER — FLUTICASONE PROPIONATE 50 MCG
2 SPRAY, SUSPENSION (ML) NASAL DAILY
Qty: 1 EACH | Refills: 0 | Status: SHIPPED | OUTPATIENT
Start: 2023-01-31

## 2023-01-31 RX ADMIN — BUTALBITAL, ACETAMINOPHEN, AND CAFFEINE 2 TABLET: 50; 325; 40 TABLET ORAL at 12:40

## 2023-01-31 NOTE — ED TRIAGE NOTES
Patient presents to ED for c/o headache, cough, body aches, sneezing, and runny nose since yesterday. Patient took benadryl with no relief.

## 2023-01-31 NOTE — Clinical Note
[address] EMERGENCY DEPT  [address] [facility] [id number] [phone]    Work/School Note    Date: 1/31/2023    To Whom It May concern:    Amanda Trent was seen and treated today in the emergency room by the following provider(s):  Attending Provider: Piter Lewis MD.      Amanda Trent is excused from work/school on 01/31/23 and 02/01/23. She is medically clear to return to work/school on 2/2/2023.        Sincerely,          Aziza Kyle MD

## 2023-01-31 NOTE — ED PROVIDER NOTES
EMERGENCY DEPARTMENT HISTORY AND PHYSICAL EXAM      Patient Name: Raji Rodriguez  MRN: [id number]  YOB: 1990    Provider: Sena Hidalgo MD  PCP: Adebayo Ferrara MD     Time/Date of evaluation: 11:50 AM 1/31/23      History of Presenting Illness     Chief Complaint   Patient presents with    Flu Like Symptoms       History Provided By: Patient     History Mamie Cisneros):   Raji Rodriguez is a 33 y.o. female with a PMHx of anemia, bronchitis, and depression  who presents to the emergency department (room 10) by POV C/O cough, nasal congestion, sinus pressure, and headache for the past 2 days. Patient denies any known sick contacts. She is vaccinated for COVID and has received 1 booster. Her headache is worse with bright light exposure. Nothing makes his symptoms better. She does endorse chills but denies any fever.       Past History     Past Medical History:  Past Medical History:   Diagnosis Date    Abnormal Pap smear     Anemia NEC     per pt takes iron PO    Asthma     bronchitis    Breastfeeding (infant)     Complication of anesthesia     never had    Depression     HX OTHER MEDICAL     Pregnant    Mild episode of recurrent major depressive disorder ([facility] [address] 4/21/2020       Past Surgical History:  Past Surgical History:   Procedure Laterality Date    HX GYN      c section    HX TUBAL LIGATION  12/14/2021       Family History:  Family History   Problem Relation Age of Onset    Cancer Maternal Grandfather     Diabetes Paternal Grandmother     Diabetes Father     Hypertension Father        Social History:  Social History     Tobacco Use    Smoking status: Every Day     Packs/day: 1.00     Years: 3.00     Pack years: 3.00     Types: Cigarettes    Smokeless tobacco: Never    Tobacco comments:     black and milds   Vaping Use    Vaping Use: Never used   Substance Use Topics    Alcohol use: Yes     Comment: occassionally    Drug use: Not Currently     Types: Marijuana       Medications:  Current Outpatient Medications   Medication Sig Dispense Refill    fluticasone propionate (FLONASE) 50 mcg/actuation nasal spray 2 Sprays by Both Nostrils route daily. 1 Each 0    albuterol (PROVENTIL HFA, VENTOLIN HFA, PROAIR HFA) 90 mcg/actuation inhaler Take 2 Puffs by inhalation every four (4) hours as needed for Wheezing. 1 Each 0    predniSONE (DELTASONE) 20 mg tablet Take 3 Tablets by mouth daily for 5 days. 15 Tablet 0    loratadine (CLARITIN) 10 mg tablet Take 1 Tablet by mouth daily. 20 Tablet 0    pseudoephedrine CR (SUDAFED) 120 mg CR tablet Take 1 Tablet by mouth two (2) times daily as needed for Congestion. 20 Tablet 0    multivitamin (ONE A DAY) tablet Take 1 Tablet by mouth daily. 30 Tablet 11    lidocaine (LIDODERM) 5 % Apply patch to the affected area for 12 hours a day and remove for 12 hours a day. 1 Each 0    lurasidone (LATUDA) 40 mg tab tablet Take 1 Tablet by mouth daily (with breakfast). Unclear dosage. -RES   Indications: bipolar depression 30 Tablet 11       Allergies:  No Known Allergies    Social Determinants of Health:  Social Determinants of Health     Tobacco Use: High Risk    Smoking Tobacco Use: Every Day    Smokeless Tobacco Use: Never    Passive Exposure: Not on file   Alcohol Use: Not on file   Financial Resource Strain: Not on file   Food Insecurity: Not on file   Transportation Needs: Not on file   Physical Activity: Not on file   Stress: Not on file   Social Connections: Not on file   Intimate Partner Violence: Not on file   Depression: Not at risk    PHQ-2 Score: 2   Housing Stability: Not on file       Review of Systems     Review of Systems   Constitutional:  Positive for chills and fatigue. HENT:  Positive for congestion and sinus pressure. Respiratory:  Positive for cough. Neurological:  Positive for headaches.      Physical Exam     Patient Vitals for the past 24 hrs:   Temp Pulse Resp BP SpO2   01/31/23 1122 98.1 °F (36.7 °C) 98 16 127/79 98 %       Physical Exam  Vitals and nursing note reviewed. Constitutional:       Appearance: Normal appearance. She is well-developed. HENT:      Head: Normocephalic and atraumatic. Eyes:      Conjunctiva/sclera: Conjunctivae normal.   Cardiovascular:      Rate and Rhythm: Normal rate and regular rhythm. Pulses: Normal pulses. Heart sounds: Normal heart sounds, S1 normal and S2 normal.   Pulmonary:      Effort: Pulmonary effort is normal. No respiratory distress. Breath sounds: Normal breath sounds. No wheezing. Abdominal:      General: Bowel sounds are normal. There is no distension. Palpations: Abdomen is soft. Tenderness: There is no abdominal tenderness. There is no rebound. Musculoskeletal:         General: Normal range of motion. Cervical back: Full passive range of motion without pain, normal range of motion and neck supple. Skin:     General: Skin is warm and dry. Findings: No rash. Neurological:      Mental Status: She is alert and oriented to person, place, and time. Psychiatric:         Speech: Speech normal.         Behavior: Behavior normal.         Thought Content: Thought content normal.         Judgment: Judgment normal.       Diagnostic Study Results     Labs:  Recent Results (from the past 12 hour(s))   INFLUENZA A+B VIRAL AGS    Collection Time: 01/31/23 11:53 AM   Result Value Ref Range    Influenza A Antigen Negative NEG      Influenza B Antigen Negative NEG         ED Course     11:50 AM I Yareli Siddiqi MD) am the first provider for this patient. Initial assessment performed. I reviewed the vital signs, available nursing notes, past medical history, past surgical history, family history and social history. The patients presenting problems have been discussed, and they are in agreement with the care plan formulated and outlined with them. I have encouraged them to ask questions as they arise throughout their visit.     Records Reviewed: Nursing Notes, Old Medical Records, and Clinical Records from a Different Specialty (PCP notes)    Pulse Oximetry Analysis - 98% on RA    MEDICATIONS ADMINISTERED IN THE ED:  Medications - No data to display    ED Course as of 01/31/23 1221   Tue Jan 31, 2023   1221 Influenza swab negative. COVID swab pending. Will discharge with a prescription for albuterol, prednisone, Flonase, loratadine, and pseudoephedrine for her congestion/sinus symptoms. [MS]      ED Course User Index  [MS] Mickey Fitzpatrick MD       Medical Decision Making     DDX: Influenza, COVID-19, bronchitis, viral URI, sinusitis    DISCUSSION:  This appears to be a moderate condition. This appears to be an acute condition. 33 y.o. female presents with cough, congestion, headache, and a scratchy throat for the past 2 days. Patient also endorses myalgias and sinus pressure. She is well-appearing and in no distress. She does have significant nasal congestion but has clear lungs and is in no distress. Will swab for both flu and COVID and reassess. Patient knows we will get the COVID results for 24 hours and she will only be notified if the test is positive. In evaluation of the above differential diagnosis, consideration was given to the following tests and treatments (flu and COVID swab). The decision to perform testing and results were discussed with the patient. I discussed each of these tests and considerations with the patient. The patient agrees with the plan of discharge. Additional Considerations:  Imaging considered in the ED, but not accomplished include: Chest x-ray because patient denies any shortness of breath, fever, or concerning symptoms of pneumonia. With shared decision making, patient declines imaging. Diagnosis and Disposition     DISCHARGE NOTE:  Arelis Sargentton's results have been reviewed with her. She has been counseled regarding her diagnosis, treatment, and plan.  She verbally conveys understanding and agreement of the signs, symptoms, diagnosis, treatment and prognosis and additionally agrees to follow up as discussed. She also agrees with the care-plan and conveys that all of her questions have been answered. I have also provided discharge instructions for her that include: educational information regarding their diagnosis and treatment, and list of reasons why they would want to return to the ED prior to their follow-up appointment, should her condition change. She has been provided with education for proper emergency department utilization. CLINICAL IMPRESSION:    1. Viral URI with cough    2. Person under investigation for COVID-19    3. Acute bronchitis, unspecified organism        PLAN:  1. D/C Home  2. Current Discharge Medication List        START taking these medications    Details   predniSONE (DELTASONE) 20 mg tablet Take 3 Tablets by mouth daily for 5 days. Qty: 15 Tablet, Refills: 0  Start date: 1/31/2023, End date: 2/5/2023      loratadine (CLARITIN) 10 mg tablet Take 1 Tablet by mouth daily. Qty: 20 Tablet, Refills: 0  Start date: 1/31/2023      pseudoephedrine CR (SUDAFED) 120 mg CR tablet Take 1 Tablet by mouth two (2) times daily as needed for Congestion. Qty: 20 Tablet, Refills: 0  Start date: 1/31/2023           CONTINUE these medications which have CHANGED    Details   fluticasone propionate (FLONASE) 50 mcg/actuation nasal spray 2 Sprays by Both Nostrils route daily. Qty: 1 Each, Refills: 0  Start date: 1/31/2023      albuterol (PROVENTIL HFA, VENTOLIN HFA, PROAIR HFA) 90 mcg/actuation inhaler Take 2 Puffs by inhalation every four (4) hours as needed for Wheezing. Qty: 1 Each, Refills: 0  Start date: 1/31/2023    Associated Diagnoses: Acute bronchitis, unspecified organism           3.    Follow-up Information       Follow up With Specialties Details Why Contact Janel Elizabeth MD Adolescent Medicine [facility] Schedule an appointment as soon as possible for a visit   [address]  [address] [facility] [address] EMERGENCY DEPT Emergency Medicine  As needed, If symptoms worsen [address] Edouard MD am the primary clinician of record. Dragon Disclaimer     Please note that this dictation was completed with PowerVision, the computer voice recognition software. Quite often unanticipated grammatical, syntax, homophones, and other interpretive errors are inadvertently transcribed by the computer software. Please disregard these errors. Please excuse any errors that have escaped final proofreading.     Loly Edouard MD

## 2023-01-31 NOTE — ED NOTES
Discharge instructions were given to the patient by Herminio Khan RN. The patient left the Emergency Department ambulatory, alert and oriented and in no acute distress with 5 prescriptions. The patient was encouraged to call or return to the ED for worsening issues or problems and was encouraged to schedule a follow up appointment for continuing care. The patient verbalized understanding of discharge instructions and prescriptions, all questions were answered. The patient has no further concerns at this time.

## 2023-01-31 NOTE — ED NOTES
Pt presents to ED complaining of sinus pressure, cough, runny nose, headaches, and malaise. Pt reports she is vaxxed for COVID and flu. Pt breathes well on room air despite nasal congestion. Pt reports symptoms started yesterday. Pt is alert and oriented x 4, RR even and unlabored, skin is warm and dry. Assessment completed and pt updated on plan of care. Call bell in reach. Emergency Department Nursing Plan of Care       The Nursing Plan of Care is developed from the Nursing assessment and Emergency Department Attending provider initial evaluation. The plan of care may be reviewed in the ED Provider note.     The Plan of Care was developed with the following considerations:   Patient / Family readiness to learn indicated by:verbalized understanding  Persons(s) to be included in education: patient  Barriers to Learning/Limitations:No    Signed     Maye Oglesby RN    1/31/2023

## 2023-02-22 ENCOUNTER — OFFICE VISIT (OUTPATIENT)
Dept: INTERNAL MEDICINE CLINIC | Age: 33
End: 2023-02-22
Payer: MEDICAID

## 2023-02-22 VITALS
WEIGHT: 265.8 LBS | SYSTOLIC BLOOD PRESSURE: 121 MMHG | BODY MASS INDEX: 44.28 KG/M2 | HEIGHT: 65 IN | HEART RATE: 86 BPM | TEMPERATURE: 98 F | RESPIRATION RATE: 18 BRPM | DIASTOLIC BLOOD PRESSURE: 84 MMHG | OXYGEN SATURATION: 97 %

## 2023-02-22 DIAGNOSIS — F31.81 BIPOLAR II DISORDER (HCC): ICD-10-CM

## 2023-02-22 DIAGNOSIS — Z00.00 PHYSICAL EXAM: ICD-10-CM

## 2023-02-22 DIAGNOSIS — R73.02 IMPAIRED GLUCOSE TOLERANCE: ICD-10-CM

## 2023-02-22 DIAGNOSIS — E66.01 MORBID OBESITY (HCC): ICD-10-CM

## 2023-02-22 DIAGNOSIS — G47.33 OBSTRUCTIVE SLEEP APNEA: ICD-10-CM

## 2023-02-22 DIAGNOSIS — E78.5 DYSLIPIDEMIA: ICD-10-CM

## 2023-02-22 DIAGNOSIS — J45.20 MILD INTERMITTENT ASTHMA WITHOUT COMPLICATION: Primary | ICD-10-CM

## 2023-02-22 NOTE — PROGRESS NOTES
Amaris Mathis is a 33 y.o. female  Chief Complaint   Patient presents with    Establish Care     Patient here to Establish Care. Former patient of Dr. Arian Goff. 1. Have you been to the ER, urgent care clinic since your last visit? Hospitalized since your last visit? Yes When: 01/31/2023 Where: [facility] Reason for visit: Upper Respiratory Infection. 2. Have you seen or consulted any other health care providers outside of the [address] [place] [address] since your last visit? Include any pap smears or colon screening.  No

## 2023-02-22 NOTE — PROGRESS NOTES
[address] [facility]  [place] [address]  [address] [place] [address]  Phone:  [phone]  Fax: [phone]       Chief Complaint   Patient presents with    Establish Care     Patient here to Establish Care. Former patient of Dr. Marlin Villasenor. .      SUBJECTIVE:    Luna Lakhani is a 33 y.o. female  Dictation on: 02/22/2023 11:11 AM by: Parveen PENA [[id number]]          Current Outpatient Medications   Medication Sig Dispense Refill    fluticasone propionate (FLONASE) 50 mcg/actuation nasal spray 2 Sprays by Both Nostrils route daily. (Patient not taking: Reported on 2/22/2023) 1 Each 0    albuterol (PROVENTIL HFA, VENTOLIN HFA, PROAIR HFA) 90 mcg/actuation inhaler Take 2 Puffs by inhalation every four (4) hours as needed for Wheezing. (Patient not taking: Reported on 2/22/2023) 1 Each 0    loratadine (CLARITIN) 10 mg tablet Take 1 Tablet by mouth daily. (Patient not taking: Reported on 2/22/2023) 20 Tablet 0    pseudoephedrine CR (SUDAFED) 120 mg CR tablet Take 1 Tablet by mouth two (2) times daily as needed for Congestion. (Patient not taking: Reported on 2/22/2023) 20 Tablet 0    multivitamin (ONE A DAY) tablet Take 1 Tablet by mouth daily. (Patient not taking: Reported on 2/22/2023) 30 Tablet 11    lidocaine (LIDODERM) 5 % Apply patch to the affected area for 12 hours a day and remove for 12 hours a day. (Patient not taking: Reported on 2/22/2023) 1 Each 0    lurasidone (LATUDA) 40 mg tab tablet Take 1 Tablet by mouth daily (with breakfast). Unclear dosage.  -RES   Indications: bipolar depression (Patient not taking: Reported on 2/22/2023) 30 Tablet 11     Past Medical History:   Diagnosis Date    Abnormal Pap smear     Anemia NEC     per pt takes iron PO    Asthma     bronchitis    Complication of anesthesia     never had    Depression     HX OTHER MEDICAL     Pregnant    Mild episode of recurrent major depressive disorder ([facility] [address] 4/21/2020     Past Surgical History:   Procedure Laterality Date    HX GYN      c section    HX TUBAL LIGATION  12/14/2021     No Known Allergies      REVIEW OF SYSTEMS:  General: negative for - chills or fever  ENT: negative for - headaches, nasal congestion or tinnitus  Respiratory: negative for - cough, hemoptysis, shortness of breath or wheezing  Cardiovascular : negative for - chest pain, edema, palpitations or shortness of breath  Gastrointestinal: negative for - abdominal pain, blood in stools, heartburn or nausea/vomiting  Genito-Urinary: no dysuria, trouble voiding, or hematuria  Musculoskeletal: negative for - gait disturbance, joint pain, joint stiffness or joint swelling  Neurological: no TIA or stroke symptoms  Hematologic: no bruises, no bleeding, no swollen glands  Integument: no lumps, mole changes, nail changes or rash  Endocrine: no malaise/lethargy or unexpected weight changes      Social History     Socioeconomic History    Marital status:    Tobacco Use    Smoking status: Every Day     Packs/day: 1.00     Years: 3.00     Pack years: 3.00     Types: Cigarettes    Smokeless tobacco: Never    Tobacco comments:     black and milds   Vaping Use    Vaping Use: Never used   Substance and Sexual Activity    Alcohol use: Yes     Comment: occassionally    Drug use: Not Currently     Types: Marijuana    Sexual activity: Yes     Partners: Male     Birth control/protection: None     Comment: tubes tied 12/2021     Social Determinants of Health     Physical Activity: Insufficiently Active    Days of Exercise per Week: 2 days    Minutes of Exercise per Session: 30 min     Family History   Problem Relation Age of Onset    Cancer Maternal Grandfather     Diabetes Paternal Grandmother     Diabetes Father     Hypertension Father        OBJECTIVE:    Visit Vitals  /84   Pulse 86   Temp 98 °F (36.7 °C) (Oral)   Resp 18   Ht 5' 5\" (1.651 m)   Wt 265 lb 12.8 oz (120.6 kg)   LMP 12/22/2021   SpO2 97%   BMI 44.23 kg/m²     CONSTITUTIONAL: well , well nourished, appears age appropriate  EYES: perrla, eom intact  ENMT:moist mucous membranes, pharynx clear  NECK: supple. Thyroid normal  RESPIRATORY: Chest: clear to ascultation and percussion   CARDIOVASCULAR: Heart: regular rate and rhythm  GASTROINTESTINAL: Abdomen: soft, bowel sounds active  HEMATOLOGIC: no pathological lymph nodes palpated  MUSCULOSKELETAL: Extremities: no edema, pulse 1+   INTEGUMENT: No unusual rashes or suspicious skin lesions noted. Nails appear normal.  NEUROLOGIC: non-focal exam   MENTAL STATUS: alert and oriented, appropriate affect      ASSESSMENT:  1. Mild intermittent asthma without complication    2. Bipolar II disorder ([address]    3. Morbid obesity ([address]    4. Obstructive sleep apnea    5. Impaired glucose tolerance    6. Physical exam    7. Dyslipidemia        PLAN:   Dictation on: 02/25/2023 11:46 PM by: Jeanne Garcia [[id number]]           Follow-up and Dispositions    Return in about 6 months (around 8/22/2023).            Kenn Galvez MD

## 2023-02-23 LAB
ALBUMIN SERPL-MCNC: 4 G/DL (ref 3.8–4.8)
ALBUMIN/GLOB SERPL: 1.5 {RATIO} (ref 1.2–2.2)
ALP SERPL-CCNC: 103 IU/L (ref 44–121)
ALT SERPL-CCNC: 19 IU/L (ref 0–32)
APPEARANCE UR: CLEAR
AST SERPL-CCNC: 17 IU/L (ref 0–40)
BASOPHILS # BLD AUTO: 0 X10E3/UL (ref 0–0.2)
BASOPHILS NFR BLD AUTO: 0 %
BILIRUB SERPL-MCNC: 0.2 MG/DL (ref 0–1.2)
BILIRUB UR QL STRIP: NEGATIVE
BUN SERPL-MCNC: 8 MG/DL (ref 6–20)
BUN/CREAT SERPL: 14 (ref 9–23)
CALCIUM SERPL-MCNC: 8.9 MG/DL (ref 8.7–10.2)
CHLORIDE SERPL-SCNC: 104 MMOL/L (ref 96–106)
CHOLEST SERPL-MCNC: 164 MG/DL (ref 100–199)
CO2 SERPL-SCNC: 22 MMOL/L (ref 20–29)
COLOR UR: YELLOW
CREAT SERPL-MCNC: 0.56 MG/DL (ref 0.57–1)
EGFRCR SERPLBLD CKD-EPI 2021: 125 ML/MIN/1.73
EOSINOPHIL # BLD AUTO: 0.2 X10E3/UL (ref 0–0.4)
EOSINOPHIL NFR BLD AUTO: 2 %
ERYTHROCYTE [DISTWIDTH] IN BLOOD BY AUTOMATED COUNT: 14.2 % (ref 11.7–15.4)
EST. AVERAGE GLUCOSE BLD GHB EST-MCNC: 120 MG/DL
GLOBULIN SER CALC-MCNC: 2.6 G/DL (ref 1.5–4.5)
GLUCOSE SERPL-MCNC: 89 MG/DL (ref 70–99)
GLUCOSE UR QL STRIP: NEGATIVE
HBA1C MFR BLD: 5.8 % (ref 4.8–5.6)
HCT VFR BLD AUTO: 42 % (ref 34–46.6)
HDLC SERPL-MCNC: 32 MG/DL
HGB BLD-MCNC: 13.2 G/DL (ref 11.1–15.9)
HGB UR QL STRIP: NEGATIVE
IMM GRANULOCYTES # BLD AUTO: 0 X10E3/UL (ref 0–0.1)
IMM GRANULOCYTES NFR BLD AUTO: 0 %
KETONES UR QL STRIP: NEGATIVE
LDLC SERPL CALC-MCNC: 106 MG/DL (ref 0–99)
LEUKOCYTE ESTERASE UR QL STRIP: NEGATIVE
LYMPHOCYTES # BLD AUTO: 2.8 X10E3/UL (ref 0.7–3.1)
LYMPHOCYTES NFR BLD AUTO: 27 %
MCH RBC QN AUTO: 26.9 PG (ref 26.6–33)
MCHC RBC AUTO-ENTMCNC: 31.4 G/DL (ref 31.5–35.7)
MCV RBC AUTO: 86 FL (ref 79–97)
MICRO URNS: NORMAL
MONOCYTES # BLD AUTO: 0.8 X10E3/UL (ref 0.1–0.9)
MONOCYTES NFR BLD AUTO: 8 %
NEUTROPHILS # BLD AUTO: 6.5 X10E3/UL (ref 1.4–7)
NEUTROPHILS NFR BLD AUTO: 63 %
NITRITE UR QL STRIP: NEGATIVE
PH UR STRIP: 7.5 [PH] (ref 5–7.5)
PLATELET # BLD AUTO: 204 X10E3/UL (ref 150–450)
POTASSIUM SERPL-SCNC: 4.3 MMOL/L (ref 3.5–5.2)
PROT SERPL-MCNC: 6.6 G/DL (ref 6–8.5)
PROT UR QL STRIP: NEGATIVE
RBC # BLD AUTO: 4.91 X10E6/UL (ref 3.77–5.28)
SODIUM SERPL-SCNC: 138 MMOL/L (ref 134–144)
SP GR UR STRIP: 1.02 (ref 1–1.03)
TRIGL SERPL-MCNC: 144 MG/DL (ref 0–149)
UROBILINOGEN UR STRIP-MCNC: 0.2 MG/DL (ref 0.2–1)
VLDLC SERPL CALC-MCNC: 26 MG/DL (ref 5–40)
WBC # BLD AUTO: 10.3 X10E3/UL (ref 3.4–10.8)

## 2023-02-23 NOTE — PROGRESS NOTES
comes in as a new patient. She had formerly seen Dr. Gris Harden. She comes in for physical examination. She is accompanied by her mother. The patient is 45 and is . She has had tubal ligation, as well as endometrial ablation. One of the major problems is schizoaffective disorder for which she needs to see a psychiatrist.    She also has a history of asthma, impaired glucose tolerance, as well as morbid obesity.

## 2023-04-06 ENCOUNTER — OFFICE VISIT (OUTPATIENT)
Dept: SLEEP MEDICINE | Age: 33
End: 2023-04-06
Payer: MEDICAID

## 2023-04-06 PROCEDURE — 99204 OFFICE O/P NEW MOD 45 MIN: CPT | Performed by: INTERNAL MEDICINE

## 2023-04-06 NOTE — PROGRESS NOTES
[address] [place]. [place], [address]  Tel.  [phone]  Fax. [address] [facility]  [place], [address] [place]  Tel.  [phone]  Fax. [phone] [address] [place]  Tel.  [phone]  Fax. [phone]       Matthew Gloria is a 33y.o. year old female referred by  Dr. William Avery   for evaluation of a sleep disorder. ASSESSMENT/PLAN:      ICD-10-CM ICD-9-CM    1. Obstructive sleep apnea  G47.33 327.23 SLEEP STUDY UNATTENDED, 4 CHANNEL      2. Anxiety and depression  F41.9 300.00     F32. A 311       3. BMI 40.0-44.9, adult [facility])  Z68.41 V85.41           Patient has a history and examination consistent with the diagnosis of sleep apnea. Follow-up and Dispositions    Return for telephone follow-up after testing is completed. * The patient currently has a Moderate Risk for having sleep apnea. STOP-BANG score 5.    * Sleep testing was ordered for initial evaluation. PSG / MSLT if patient does not have MARIBEL. Orders Placed This Encounter    SLEEP STUDY UNATTENDED, 4 CHANNEL     Scheduling Instructions:      Perform test on 2 nights     Order Specific Question:   Reason for Exam     Answer:   MARIBEL       * She was provided information on sleep apnea including corresponding risk factors and the importance of proper treatment. * Treatment options were reviewed in detail. she would like to proceed with PAP therapy. Patient will be seen in follow-up in 6-8 weeks after PAP setup to gauge treatment response and adherence to therapy. * The patient was counseled regarding proper sleep hygiene, with emphasis on ensuring sufficient total sleep time; safe driving and the benefits of exercise and weight loss. * All of her questions were addressed.     2. Anxiety and Depression -  continue on current regimen - lurasidone (LATUDA) 40 mg tab tablet, she will continue to monitor her mood and follow up with her care provider for reevaluation/adjustment of medications if warranted. I have reviewed the relationship between mood as it relates to sleep-disordered breathing. 3. Recommended a dedicated weight loss program through appropriate diet and exercise regimen as significant weight reduction has been shown to reduce severity of obstructive sleep apnea. SUBJECTIVE/OBJECTIVE:    Adolfo Floyd is an 33 y.o. female referred for evaluation for a sleep disorder. She complains of snoring associated with periods of not breathing, awakening in the middle of the night because of urination. Patient reports of poor sleep quality which is not restorative, she feels tired and fatigued during the day. Symptoms began 5 years ago, gradually worsening since that time. She usually can fall asleep in 15 minutes. Family or house members note snoring, periods of not breathing. She denies of symptoms indicative of cataplexy. She does report of sleep paralysis and sleep related hallucinations. She denies of a history of unusual movements occurring during sleep. Denise Lay (P) does wake up frequently at night. She (P) is bothered by waking up too early and left unable to get back to sleep. She actually sleeps about (P) 5 hours at night and wakes up about (P) 4 times during the night. She (P) does work shifts: (P) Other Shift. Joby Finley indicates she (P) does get too little sleep at night. Her bedtime is (P) 2100. She awakens at (P) 0530. She (P) does take naps. She takes 2 naps a week lasting (P) 45 min to an hour. She has the following observed behaviors: (P) Loud snoring, Twitching of legs or feet, Pauses in breathing, Grinding teeth, Kicking with legs, Becoming very rigid and/or shaking;  . Other remarks: The patient has not undergone diagnostic testing for the current problems.      Review of Systems:  Constitutional:  No significant weight loss or weight gain  Eyes:  No blurred vision  CVS:  No significant chest pain  Pulm: No significant shortness of breath  GI:  No significant nausea or vomiting  :  + significant nocturia  Musculoskeletal:  No significant joint pain at night  Skin:  No significant rashes  Neuro:  No significant dizziness   Psych:  No active mood issues    Sleep Review of Systems: notable for Negative difficulty falling asleep; Positive awakenings at night; Positive perceived regular dreaming; Positive nightmares; Positive  early morning headaches; Negative  memory problems; Negative  concentration issues; Positive caffeine;  Negative alcohol;   Negative history of any automobile or occupational accidents due to daytime drowsiness. Griffith Sleepiness Score: (P) 4   and Modified F.O.S.Q. Score Total / 2: (P) 16.5    Visit Vitals  /82 (BP 1 Location: Left upper arm, BP Patient Position: Sitting, BP Cuff Size: Large adult long)   Pulse 94   Temp 98.2 °F (36.8 °C) (Temporal)   Ht 5' 5\" (1.651 m)   Wt 259 lb (117.5 kg)   SpO2 96%   BMI 43.10 kg/m²    Neck circ. in \"inches\": 16.5      General:   Alert, oriented, not in acute distress   Eyes:  Anicteric Sclerae; intact EOM's   Nose:  No obvious nasal septum deviation    Oropharynx:   Mallampati score 4, thick tongue base, uvula not seen due to low-lying soft palate, narrow tonsilo-pharyngeal pilars, tongue scalloped   Neck:   midline trachea,  no JVD   Chest/Lungs:  symmetrical lung expansion ,clear lung fields on auscultation    CVS:  Normal rate, regular rhythm    Extremities:  No obvious rashes, absent edema    Neuro:  No focal deficits; No obvious tremor    Psych:  Normal eye contact; normal  affect, normal countenance          Lizzette Velazquez MD, FAASM  Diplomate American Board of Sleep Medicine  Diplomate in Sleep Medicine - ABP    Electronically signed.  04/06/23

## 2023-04-06 NOTE — PATIENT INSTRUCTIONS
217 [facility]., [place]. [place], [address]  Tel.  [phone]  Fax. 100 [facility] 60  [facility], [address] [facility]  Tel.  [phone]  Fax. [phone] [address]  Tel.  [phone]  Fax. [phone]     Sleep Apnea: After Your Visit  Your Care Instructions  Sleep apnea occurs when you frequently stop breathing for 10 seconds or longer during sleep. It can be mild to severe, based on the number of times per hour that you stop breathing or have slowed breathing. Blocked or narrowed airways in your nose, mouth, or throat can cause sleep apnea. Your airway can become blocked when your throat muscles and tongue relax during sleep. Sleep apnea is common, occurring in 1 out of 20 individuals. Individuals having any of the following characteristics should be evaluated and treated right away due to high risk and detrimental consequences from untreated sleep apnea:  Obesity  Congestive Heart failure  Atrial Fibrillation  Uncontrolled Hypertension  Type II Diabetes  Night-time Arrhythmias  Stroke  Pulmonary Hypertension  High-risk Driving Populations (pilots, truck drivers, etc.)  Patients Considering Weight-loss Surgery    How do you know you have sleep apnea? You probably have sleep apnea if you answer 'yes' to 3 or more of the following questions:  S - Have you been told that you Snore? T - Are you often Tired during the day? O - Has anyone Observed you stop breathing while sleeping? P- Do you have (or are being treated for) high blood Pressure? B - Are you obese (Body Mass Index > 35)? A - Is your Age 48years old or older? N - Is your Neck size greater than 16 inches? G - Are you male Gender? A sleep physician can prescribe a breathing device that prevents tissues in the throat from blocking your airway. Or your doctor may recommend using a dental device (oral breathing device) to help keep your airway open.  In some cases, surgery may be needed to remove enlarged tissues in the throat. Follow-up care is a key part of your treatment and safety. Be sure to make and go to all appointments, and call your doctor if you are having problems. It's also a good idea to know your test results and keep a list of the medicines you take. How can you care for yourself at home? Lose weight, if needed. It may reduce the number of times you stop breathing or have slowed breathing. Go to bed at the same time every night. Sleep on your side. It may stop mild apnea. If you tend to roll onto your back, sew a pocket in the back of your [employer] top. Put a tennis ball into the pocket, and stitch the pocket shut. This will help keep you from sleeping on your back. Avoid alcohol and medicines such as sleeping pills and sedatives before bed. Do not smoke. Smoking can make sleep apnea worse. If you need help quitting, talk to your doctor about stop-smoking programs and medicines. These can increase your chances of quitting for good. Prop up the head of your bed 4 to 6 inches by putting bricks under the legs of the bed. Treat breathing problems, such as a stuffy nose, caused by a cold or allergies. Use a continuous positive airway pressure (CPAP) breathing machine if lifestyle changes do not help your apnea and your doctor recommends it. The machine keeps your airway from closing when you sleep. If CPAP does not help you, ask your doctor whether you should try other breathing machines. A bilevel positive airway pressure machine has two types of air pressureâone for breathing in and one for breathing out. Another device raises or lowers air pressure as needed while you breathe. If your nose feels dry or bleeds when using one of these machines, talk with your doctor about increasing moisture in the air. A humidifier may help.   If your nose is runny or stuffy from using a breathing machine, talk with your doctor about using decongestants or a corticosteroid nasal spray.  When should you call for help? Watch closely for changes in your health, and be sure to contact your doctor if:  You still have sleep apnea even though you have made lifestyle changes. You are thinking of trying a device such as CPAP. You are having problems using a CPAP or similar machine. Where can you learn more? Go to [employer]. Enter L199 in the search box to learn more about \"Sleep Apnea: After Your Visit. \"   © 2363-8655 Healthwise, Incorporated. Care instructions adapted under license by [employer] (which disclaims liability or warranty for this information). This care instruction is for use with your licensed healthcare professional. If you have questions about a medical condition or this instruction, always ask your healthcare professional. M[employer] any warranty or liability for your use of this information. PROPER SLEEP HYGIENE    What to avoid  Do not have drinks with caffeine, such as coffee or black tea, for 8 hours before bed. Do not smoke or use other types of tobacco near bedtime. Nicotine is a stimulant and can keep you awake. Avoid drinking alcohol late in the evening, because it can cause you to wake in the middle of the night. Do not eat a big meal close to bedtime. If you are hungry, eat a light snack. Do not drink a lot of water close to bedtime, because the need to urinate may wake you up during the night. Do not read or watch TV in bed. Use the bed only for sleeping and sexual activity. What to try  Go to bed at the same time every night, and wake up at the same time every morning. Do not take naps during the day. Keep your bedroom quiet, dark, and cool. Get regular exercise, but not within 3 to 4 hours of your bedtime. .  Sleep on a comfortable pillow and mattress. If watching the clock makes you anxious, turn it facing away from you so you cannot see the time.   If you worry when you lie down, start a worry book. Well before bedtime, write down your worries, and then set the book and your concerns aside. Try meditation or other relaxation techniques before you go to bed. If you cannot fall asleep, get up and go to another room until you feel sleepy. Do something relaxing. Repeat your bedtime routine before you go to bed again. Make your house quiet and calm about an hour before bedtime. Turn down the lights, turn off the TV, log off the computer, and turn down the volume on music. This can help you relax after a busy day. Drowsy Driving  The [address] [place] [address] Road Traffic Safety Administration cites drowsiness as a causing factor in more than 678,171 police reported crashes annually, resulting in 76,000 injuries and 1,500 deaths. Other surveys suggest 55% of people polled have driven while drowsy in the past year, 23% had fallen asleep but not crashed, 3% crashed, and 2% had and accident due to drowsy driving. Who is at risk? Young Drivers: One study of drowsy driving accidents states that 55% of the drivers were under 25 years. Of those, 75% were male. Shift Workers and Travelers: People who work overnight or travel across time zones frequently are at higher risk of experiencing Circadian Rhythm Disorders. They are trying to work and function when their body is programed to sleep. Sleep Deprived: Lack of sleep has a serious impact on your ability to pay attention or focus on a task. Consistently getting less than the average of 8 hours your body needs creates partial or cumulative sleep deprivation. Untreated Sleep Disorders: Sleep Apnea, Narcolepsy, R.L.S., and other sleep disorders (untreated) prevent a person from getting enough restful sleep. This leads to excessive daytime sleepiness and increases the risk for drowsy driving accidents by up to 7 times. Medications / Alcohol: Even over the counter medications can cause drowsiness.  Medications that impair a drivers attention should have a warning label. Alcohol naturally makes you sleepy and on its own can cause accidents. Combined with excessive drowsiness its effects are amplified. Signs of Drowsy Driving:   * You don't remember driving the last few miles   * You may drift out of your nazario   * You are unable to focus and your thoughts wander   * You may yawn more often than normal   * You have difficulty keeping your eyes open / nodding off   * Missing traffic signs, speeding, or tailgating  Prevention-   Good sleep hygiene, lifestyle and behavioral choices have the most impact on drowsy driving. There is no substitute for sleep and the average person requires 8 hours nightly. If you find yourself driving drowsy, stop and sleep. Consider the sleep hygiene tips provided during your visit as well. Medication Refill Policy: Refills for all medications require 1 week advance notice. Please have your pharmacy fax a refill request. We are unable to fax, or call in \"controled substance\" medications and you will need to pick these prescriptions up from our office. Nativ[employer] Activation    Thank you for requesting access to [employer]. Please follow the instructions below to securely access and download your online medical record. Nativoo allows you to send messages to your doctor, view your test results, renew your prescriptions, schedule appointments, and more. How Do I Sign Up? In your internet browser, go to [email or web address]. Click on the First Time User? Click Here link in the Sign In box. You will see the New Member Sign Up page. Enter your Nativoo Access Code exactly as it appears below. You will not need to use this code after youve completed the sign-up process. If you do not sign up before the expiration date, you must request a new code. Nativoo Access Code:  Activation code not generated  Current Nativoo Status: Active (This is the date your Nativoo access code will )    Enter the last four digits of your Social Security Number (xxxx) and Date of Birth (mm/dd/yyyy) as indicated and click Submit. You will be taken to the next sign-up page. Create a [employer] ID. This will be your [employer] login ID and cannot be changed, so think of one that is secure and easy to remember. Create a [employer] password. You can change your password at any time. Enter your Password Reset Question and Answer. This can be used at a later time if you forget your password. Enter your e-mail address. You will receive e-mail notification when new information is available in [address] Click Sign Up. You can now view and download portions of your medical record. Click the [employer] link to download a portable copy of your medical information. Additional Information    If you have questions, please call [phone]. Remember, [employer] is NOT to be used for urgent needs. For medical emergencies, dial 911.

## 2023-04-26 ENCOUNTER — APPOINTMENT (OUTPATIENT)
Dept: GENERAL RADIOLOGY | Age: 33
End: 2023-04-26
Attending: EMERGENCY MEDICINE
Payer: MEDICAID

## 2023-04-26 ENCOUNTER — HOSPITAL ENCOUNTER (EMERGENCY)
Age: 33
Discharge: HOME | End: 2023-04-26
Attending: EMERGENCY MEDICINE
Payer: MEDICAID

## 2023-04-26 VITALS
OXYGEN SATURATION: 100 % | BODY MASS INDEX: 40.18 KG/M2 | HEART RATE: 100 BPM | RESPIRATION RATE: 18 BRPM | DIASTOLIC BLOOD PRESSURE: 76 MMHG | HEIGHT: 66 IN | TEMPERATURE: 97.6 F | SYSTOLIC BLOOD PRESSURE: 132 MMHG | WEIGHT: 250 LBS

## 2023-04-26 DIAGNOSIS — J06.9 VIRAL UPPER RESPIRATORY TRACT INFECTION: ICD-10-CM

## 2023-04-26 DIAGNOSIS — J20.9 ACUTE BRONCHITIS, UNSPECIFIED ORGANISM: Primary | ICD-10-CM

## 2023-04-26 LAB
FLUAV RNA SPEC QL NAA+PROBE: NOT DETECTED
FLUBV RNA SPEC QL NAA+PROBE: NOT DETECTED
SARS-COV-2 RNA RESP QL NAA+PROBE: NOT DETECTED

## 2023-04-26 PROCEDURE — 74011636637 HC RX REV CODE- 636/637: Performed by: EMERGENCY MEDICINE

## 2023-04-26 PROCEDURE — 71045 X-RAY EXAM CHEST 1 VIEW: CPT

## 2023-04-26 PROCEDURE — 99283 EMERGENCY DEPT VISIT LOW MDM: CPT

## 2023-04-26 PROCEDURE — 87636 SARSCOV2 & INF A&B AMP PRB: CPT

## 2023-04-26 PROCEDURE — 99284 EMERGENCY DEPT VISIT MOD MDM: CPT

## 2023-04-26 PROCEDURE — 74011250637 HC RX REV CODE- 250/637: Performed by: EMERGENCY MEDICINE

## 2023-04-26 RX ORDER — BENZONATATE 100 MG/1
200 CAPSULE ORAL
Qty: 30 CAPSULE | Refills: 0 | Status: SHIPPED | OUTPATIENT
Start: 2023-04-26 | End: 2023-05-03

## 2023-04-26 RX ORDER — BENZONATATE 100 MG/1
200 CAPSULE ORAL
Status: COMPLETED | OUTPATIENT
Start: 2023-04-26 | End: 2023-04-26

## 2023-04-26 RX ORDER — PREDNISONE 10 MG/1
TABLET ORAL
Qty: 21 TABLET | Refills: 0 | Status: SHIPPED | OUTPATIENT
Start: 2023-04-26

## 2023-04-26 RX ORDER — PREDNISONE 20 MG/1
60 TABLET ORAL
Status: COMPLETED | OUTPATIENT
Start: 2023-04-26 | End: 2023-04-26

## 2023-04-26 RX ADMIN — BENZONATATE 200 MG: 100 CAPSULE ORAL at 01:26

## 2023-04-26 RX ADMIN — PREDNISONE 60 MG: 20 TABLET ORAL at 01:26

## 2023-04-26 NOTE — ED NOTES
Pt presents to ED ambulatory complaining of flu s/s. Pt is alert and oriented x 4, RR even and unlabored, skin is warm and dry. Assessment completed and pt updated on plan of care. Call bell in reach. Emergency Department Nursing Plan of Care       The Nursing Plan of Care is developed from the Nursing assessment and Emergency Department Attending provider initial evaluation. The plan of care may be reviewed in the ED Provider note.     The Plan of Care was developed with the following considerations:   Patient / Family readiness to learn indicated by:verbalized understanding  Persons(s) to be included in education: patient  Barriers to Learning/Limitations:No    Signed     Nikki Kim, JARRETT    4/26/2023   1:41 AM

## 2023-04-26 NOTE — DISCHARGE INSTRUCTIONS
It was a pleasure taking care of you in our Emergency Department today. We know that when you come to [facility], you are entrusting us with your health, comfort, and safety. Our physicians and nurses honor that trust, and truly appreciate the opportunity to care for you and your loved ones. We also value your feedback. If you receive a survey about your Emergency Department experience today, please fill it out. We care about our patients' feedback, and we listen to what you have to say. Thank you!       Dr. Krystal Hammer MD

## 2023-04-26 NOTE — ED PROVIDER NOTES
[facility] EMERGENCY DEPT  EMERGENCY DEPARTMENT ENCOUNTER       Pt Name: Adolfo Floyd  MRN: [id number]  [place] 1990  Date of evaluation: 4/26/2023  Provider: Mulugeta Cameron MD   PCP: Sharla Antoine MD  Note Started: 2:08 AM 4/26/23     CHIEF COMPLAINT       Chief Complaint   Patient presents with    Flu Like Symptoms        HISTORY OF PRESENT ILLNESS: 1 or more elements      History From: Patient  HPI Limitations : None     Adolfo Floyd is a 33 y.o. female who presents with fatigue, generalized aches pains, congestion and cough that is productive of light yellow sputum. Has history of asthma, uses an inhaler as needed and has been using it for the last few days with some improvement. Symptoms started last Friday. She came tonight because over the past 1 to 2 days her cough has worsened and she is reporting a posttussive, generalized burning chest pain. No pleuritic chest pain, positional chest pain, shortness of breath. No lower extremity edema, orthopnea, PND. Please see more comprehensive history below under MDM  Nursing Notes were all reviewed in real time as they are made available. Any disagreements addressed in the HPI/MDM. REVIEW OF SYSTEMS      Review of Systems   Constitutional:  Positive for chills and fatigue. Negative for fever. HENT:  Positive for congestion. Negative for ear pain, sore throat and trouble swallowing. Eyes:  Negative for pain and visual disturbance. Respiratory:  Positive for cough. Negative for shortness of breath and wheezing. Cardiovascular:  Negative for chest pain and palpitations. Gastrointestinal:  Negative for abdominal pain, diarrhea, nausea and vomiting. Genitourinary:  Negative for dysuria and flank pain. Musculoskeletal:  Negative for arthralgias, back pain, neck pain and neck stiffness. Skin:  Negative for color change. Neurological:  Negative for light-headedness and headaches. Hematological:  Negative for adenopathy. Psychiatric/Behavioral:  Negative for confusion. Positives and Pertinent negatives as per HPI and MDM. PAST HISTORY     Past Medical History:  Past Medical History:   Diagnosis Date    Abnormal Pap smear     Anemia NEC     per pt takes iron PO    Asthma     bronchitis    Complication of anesthesia     never had    Depression     HX OTHER MEDICAL     Pregnant    Mild episode of recurrent major depressive disorder ([facility] [address] 4/21/2020       Past Surgical History:  Past Surgical History:   Procedure Laterality Date    HX GYN      c section    HX TUBAL LIGATION  12/14/2021       Family History:  Family History   Problem Relation Age of Onset    Cancer Maternal Grandfather     Diabetes Paternal Grandmother     Diabetes Father     Hypertension Father        Social History:  Social History     Tobacco Use    Smoking status: Every Day     Packs/day: 1.00     Years: 3.00     Pack years: 3.00     Types: Cigarettes    Smokeless tobacco: Never    Tobacco comments:     black and milds   Vaping Use    Vaping Use: Never used   Substance Use Topics    Alcohol use: Yes     Comment: occassionally    Drug use: Not Currently     Types: Marijuana       Allergies:  No Known Allergies    CURRENT MEDICATIONS      Previous Medications    ALBUTEROL (PROVENTIL HFA, VENTOLIN HFA, PROAIR HFA) 90 MCG/ACTUATION INHALER    Take 2 Puffs by inhalation every four (4) hours as needed for Wheezing. LURASIDONE (LATUDA) 40 MG TAB TABLET    Take 1 Tablet by mouth daily (with breakfast). Unclear dosage. -RES   Indications: bipolar depression    MULTIVITAMIN (ONE A DAY) TABLET    Take 1 Tablet by mouth daily. PHYSICAL EXAM      ED Triage Vitals [04/26/23 0029]   ED Encounter Vitals Group      /76      Pulse (Heart Rate) 100      Resp Rate 18      Temp 97.6 °F (36.4 °C)      Temp src       O2 Sat (%) 100 %      Weight 250 lb      Height 5' 6\"        Physical Exam  Vitals and nursing note reviewed. HENT:      Nose: Congestion present. Mouth/Throat:      Mouth: Mucous membranes are moist.      Pharynx: Oropharynx is clear. Eyes:      Conjunctiva/sclera: Conjunctivae normal.   Cardiovascular:      Rate and Rhythm: Normal rate and regular rhythm. Pulses: Normal pulses. Heart sounds: Normal heart sounds. Pulmonary:      Effort: Pulmonary effort is normal.      Breath sounds: Normal breath sounds. Abdominal:      General: Bowel sounds are normal.      Palpations: Abdomen is soft. Tenderness: There is no abdominal tenderness. Musculoskeletal:         General: Normal range of motion. Cervical back: Normal range of motion and neck supple. Lymphadenopathy:      Cervical: No cervical adenopathy. Skin:     General: Skin is warm and dry. Capillary Refill: Capillary refill takes less than 2 seconds. Findings: No rash. Neurological:      Mental Status: She is alert and oriented to person, place, and time. DIAGNOSTIC RESULTS   LABS:     Recent Results (from the past 24 hour(s))   COVID-19 WITH INFLUENZA A/B    Collection Time: 04/26/23  1:14 AM   Result Value Ref Range    SARS-CoV-2 by PCR Not detected NOTD      Influenza A by PCR Not detected      Influenza B by PCR Not detected             RADIOLOGY:  Non-plain film images such as CT, Ultrasound and MRI are read by the radiologist. Plain radiographic images are visualized and preliminarily interpreted by the ED Provider with the below findings:     Cxr reviewed as soon as images were available. Images interpreted by me personally. No acute process identified. Final radiology read to follow and will be copied below. Ronny Gordon MD       Interpretation per the Radiologist below, if available at the time of this note:     XR CHEST PORT   Final Result      No acute process on portable chest.              PROCEDURES       SCREENINGS   All screenings conducted, scored and interpreted by me.  Natalie Tipton MD        [address] [place] [address] DECISION MAKING     Vitals:    Vitals:    04/26/23 0029   BP: 132/76   Pulse: 100   Resp: 18   Temp: 97.6 °F (36.4 °C)   SpO2: 100%   Weight: 113.4 kg (250 lb)   Height: 5' 6\" (1.676 m)       Pertinent Chronic Medical Conditions or Prior Surgeries: Listed under PMH above and includes asthma    Social Determinants affecting Dx or Tx: None    Records Reviewed: Nursing notes  I reviewed and interpreted the nursing notes and and vital signs from today's visit, as well as the electronic medical record system for any external medical records that were available that may contribute to the patients current condition. Nursing notes will be reviewed and interpreted by me as they become available in realtime while the pt has been in the ED. Pulse ox monitor ordered and interpreted by me. O2 sat is 100% on ra    Continuous cardiac and pulse ox monitoring necessary to monitor patient for sings of cardiac dysrhythmia, acute hypoxemia, RR abnormalities, which patient is at risk for based on their history, risk for cardiovascular disease, pulmonary disease and/or metabolic abnormalities. CC/HPI Summary, DDx, MDM, ED Course, and Reassessment:   Patient with history of controlled asthma, recurrent bronchitis presents to the ed with appx 5 days of congestion, generalized fatigue/malaise, cough productive of light yellow sputum. No measured fevers. Vitals in the ed normal.  On exam, appears non-toxic but with frequent cough. CV exam normal. Lungs clear to auscultation, normal work of breathing. Ddx: acute viral bronchitis, covid 19, influenza, possibly pna. Obtained covid and flu swabs, cxr. Will start patient on course of steroids. Already using inhalers at home prn. In the past reports good response to tessalon perles so will stat her on that for cough suppression. I personally reviewed and interpreted the patient's laboratory studies. Covid and flu negative.   I independently reviewed and interpreted the patient's imaging studies. Cxr normal, no evidence of pna. Likely viral bronchitis. Symptomatic mgmt indicated. No role for abx in this case. Questions answered. She feels comfortable with plan to go home. Return precautions advised. Medical Decision Making  Amount and/or Complexity of Data Reviewed  Labs: ordered. Decision-making details documented in ED Course. Radiology: ordered and independent interpretation performed. Risk  OTC drugs. Prescription drug management. Disposition Considerations and Planning:  I have discussed with the patient and/or caregiver my initial clinical impression which is based on an evidence-based clinical evaluation of the patient and interpretation of available results. Involved patient and/or caregiver in management, treatment options and final disposition. Patient and/or caregiver verbalizes understanding and agreement. ED Medications Administered  Medications   benzonatate (TESSALON) capsule 200 mg (200 mg Oral Given 4/26/23 0126)   predniSONE (DELTASONE) tablet 60 mg (60 mg Oral Given 4/26/23 0126)       ED Orders Placed:  Orders Placed This Encounter    COVID-19 WITH INFLUENZA A/B    XR CHEST PORT    benzonatate (TESSALON) capsule 200 mg    predniSONE (DELTASONE) tablet 60 mg    predniSONE (STERAPRED DS) 10 mg dose pack    benzonatate (Tessalon Perles) 100 mg capsule             FINAL IMPRESSION     1. Acute bronchitis, unspecified organism    2. Viral upper respiratory tract infection          DISPOSITION/PLAN     Progress note:  Patient has been reassessed and reports feeling considerably better, has normal vital signs and feels comfortable going home. I think this is reasonable as no findings today suggest a life-threatening condition. DISPOSITION: DISCHARGE  The patient's results have been reviewed with patient and available family and/or caregiver.  They verbally convey their understanding and agreement of the patient's signs, symptoms, diagnosis, treatment and prognosis and additionally agree to follow up as recommended in the discharge instructions or to return to the Emergency Department should the patient's condition change prior to their follow-up appointment. The patient and available family and/or caregiver verbally agree with the care plan and all of their questions have been answered. The discharge instructions have also been provided to the them with educational information regarding the patient's diagnosis as well a list of reasons why the patient would want to return to the ER prior to their follow-up appointment should any concerns arise, the patient's condition change or symptoms worsen. Sandra Manzano MD, Msc    PLAN:  Current Discharge Medication List        START taking these medications    Details   predniSONE (STERAPRED DS) 10 mg dose pack Use as directed  Qty: 21 Tablet, Refills: 0  Start date: 4/26/2023      benzonatate (Tessalon Perles) 100 mg capsule Take 2 Capsules by mouth three (3) times daily as needed for Cough for up to 7 days. Qty: 30 Capsule, Refills: 0  Start date: 4/26/2023, End date: 5/3/2023           2. Follow-up Information       Follow up With Specialties Details Why Contact Info    Omkar Rhoades MD Internal Medicine Physician Schedule an appointment as soon as possible for a visit in 2 days  [facility]  [address] [facility] [address] [phone]      [address] [place] [address] DEPT Emergency Medicine Go to  As needed, If symptoms worsen [address]          3. Return to ED if worse       I am the Primary Clinician of Record. Melina Hamilton MD (electronically signed)    (Please note that parts of this dictation were completed with voice recognition software. Quite often unanticipated grammatical, syntax, homophones, and other interpretive errors are inadvertently transcribed by the computer software. Please disregards these errors.  Please excuse any errors that have escaped final proofreading.)

## 2023-04-26 NOTE — ED TRIAGE NOTES
Flu like symptoms including generalized body aches, sore throat and cough x 5 days. Reports taking OTC meds w/ no relief.  Last med sangeeta blandon night time med at 5pm.

## 2023-04-26 NOTE — ED NOTES
Discharge instructions were given to the patient by Fernando Izaguirre RN. The patient left the Emergency Department ambulatory, alert and oriented and in no acute distress with 2 prescriptions. The patient was encouraged to call or return to the ED for worsening issues or problems and was encouraged to schedule a follow up appointment for continuing care. The patient verbalized understanding of discharge instructions and prescriptions, all questions were answered. The patient has no further concerns at this time.

## 2023-06-02 ENCOUNTER — PROCEDURE VISIT (OUTPATIENT)
Age: 33
End: 2023-06-02

## 2023-06-02 DIAGNOSIS — G47.33 OBSTRUCTIVE SLEEP APNEA: Primary | ICD-10-CM

## 2023-06-02 NOTE — PROGRESS NOTES
S>Monica Parekh is a 33 y.o. female seen today to receive a home sleep testing unit (HST). Patient was educated on proper hookup and operation of the HST via detailed instruction sheet . Instruction forms with after hours contact and documentation were signed. O>    There were no vitals taken for this visit. A>  No diagnosis found. P>  General information regarding operations and maintenance of the device was provided. Follow-up appointment was made to return the HST. She will be contacted once the results have been reviewed. She was asked to contact our office for any problems regarding her home sleep test study.        Cam Rivera,RRT,RPSGT, CSE

## 2023-06-04 ENCOUNTER — HOSPITAL ENCOUNTER (OUTPATIENT)
Facility: HOSPITAL | Age: 33
Discharge: HOME | End: 2023-06-07
Payer: MEDICAID

## 2023-06-05 PROCEDURE — G0399 HOME SLEEP TEST/TYPE 3 PORTA: HCPCS | Performed by: INTERNAL MEDICINE

## 2023-06-19 ENCOUNTER — CLINICAL DOCUMENTATION (OUTPATIENT)
Age: 33
End: 2023-06-19

## 2023-08-25 SDOH — ECONOMIC STABILITY: TRANSPORTATION INSECURITY
IN THE PAST 12 MONTHS, HAS LACK OF TRANSPORTATION KEPT YOU FROM MEETINGS, WORK, OR FROM GETTING THINGS NEEDED FOR DAILY LIVING?: NO

## 2023-08-25 SDOH — ECONOMIC STABILITY: INCOME INSECURITY: HOW HARD IS IT FOR YOU TO PAY FOR THE VERY BASICS LIKE FOOD, HOUSING, MEDICAL CARE, AND HEATING?: VERY HARD

## 2023-08-25 SDOH — ECONOMIC STABILITY: FOOD INSECURITY: WITHIN THE PAST 12 MONTHS, YOU WORRIED THAT YOUR FOOD WOULD RUN OUT BEFORE YOU GOT MONEY TO BUY MORE.: OFTEN TRUE

## 2023-08-25 SDOH — ECONOMIC STABILITY: HOUSING INSECURITY
IN THE LAST 12 MONTHS, WAS THERE A TIME WHEN YOU DID NOT HAVE A STEADY PLACE TO SLEEP OR SLEPT IN A SHELTER (INCLUDING NOW)?: NO

## 2023-08-25 SDOH — ECONOMIC STABILITY: FOOD INSECURITY: WITHIN THE PAST 12 MONTHS, THE FOOD YOU BOUGHT JUST DIDN'T LAST AND YOU DIDN'T HAVE MONEY TO GET MORE.: OFTEN TRUE

## 2023-08-28 ENCOUNTER — OFFICE VISIT (OUTPATIENT)
Facility: CLINIC | Age: 33
End: 2023-08-28

## 2023-08-28 VITALS
BODY MASS INDEX: 40.87 KG/M2 | TEMPERATURE: 98.2 F | WEIGHT: 254.3 LBS | OXYGEN SATURATION: 96 % | SYSTOLIC BLOOD PRESSURE: 122 MMHG | DIASTOLIC BLOOD PRESSURE: 83 MMHG | HEART RATE: 97 BPM | RESPIRATION RATE: 18 BRPM | HEIGHT: 66 IN

## 2023-08-28 DIAGNOSIS — F31.81 BIPOLAR II DISORDER (HCC): Primary | ICD-10-CM

## 2023-08-28 DIAGNOSIS — N31.9 NEUROGENIC BLADDER: ICD-10-CM

## 2023-08-28 DIAGNOSIS — R73.02 IMPAIRED GLUCOSE TOLERANCE: ICD-10-CM

## 2023-08-28 DIAGNOSIS — E66.01 MORBID OBESITY (HCC): ICD-10-CM

## 2023-08-28 DIAGNOSIS — G47.33 OBSTRUCTIVE SLEEP APNEA: ICD-10-CM

## 2023-08-28 DIAGNOSIS — Z00.00 PHYSICAL EXAM: ICD-10-CM

## 2023-08-28 RX ORDER — BUSPIRONE HYDROCHLORIDE 10 MG/1
10 TABLET ORAL EVERY 8 HOURS
COMMUNITY
Start: 2023-06-20

## 2023-08-28 SDOH — HEALTH STABILITY: PHYSICAL HEALTH: ON AVERAGE, HOW MANY DAYS PER WEEK DO YOU ENGAGE IN MODERATE TO STRENUOUS EXERCISE (LIKE A BRISK WALK)?: 3 DAYS

## 2023-08-28 SDOH — ECONOMIC STABILITY: HOUSING INSECURITY: IN THE LAST 12 MONTHS, HOW MANY PLACES HAVE YOU LIVED?: 2

## 2023-08-28 SDOH — ECONOMIC STABILITY: FOOD INSECURITY: WITHIN THE PAST 12 MONTHS, YOU WORRIED THAT YOUR FOOD WOULD RUN OUT BEFORE YOU GOT MONEY TO BUY MORE.: SOMETIMES TRUE

## 2023-08-28 SDOH — ECONOMIC STABILITY: INCOME INSECURITY: IN THE LAST 12 MONTHS, WAS THERE A TIME WHEN YOU WERE NOT ABLE TO PAY THE MORTGAGE OR RENT ON TIME?: YES

## 2023-08-28 SDOH — HEALTH STABILITY: PHYSICAL HEALTH: ON AVERAGE, HOW MANY MINUTES DO YOU ENGAGE IN EXERCISE AT THIS LEVEL?: 60 MIN

## 2023-08-28 SDOH — ECONOMIC STABILITY: TRANSPORTATION INSECURITY
IN THE PAST 12 MONTHS, HAS THE LACK OF TRANSPORTATION KEPT YOU FROM MEDICAL APPOINTMENTS OR FROM GETTING MEDICATIONS?: NO

## 2023-08-28 SDOH — ECONOMIC STABILITY: HOUSING INSECURITY
IN THE LAST 12 MONTHS, WAS THERE A TIME WHEN YOU DID NOT HAVE A STEADY PLACE TO SLEEP OR SLEPT IN A SHELTER (INCLUDING NOW)?: YES

## 2023-08-28 SDOH — ECONOMIC STABILITY: FOOD INSECURITY: WITHIN THE PAST 12 MONTHS, THE FOOD YOU BOUGHT JUST DIDN'T LAST AND YOU DIDN'T HAVE MONEY TO GET MORE.: SOMETIMES TRUE

## 2023-08-28 ASSESSMENT — PATIENT HEALTH QUESTIONNAIRE - PHQ9
SUM OF ALL RESPONSES TO PHQ QUESTIONS 1-9: 0
2. FEELING DOWN, DEPRESSED OR HOPELESS: 0
SUM OF ALL RESPONSES TO PHQ QUESTIONS 1-9: 0
1. LITTLE INTEREST OR PLEASURE IN DOING THINGS: 0
SUM OF ALL RESPONSES TO PHQ QUESTIONS 1-9: 0
SUM OF ALL RESPONSES TO PHQ9 QUESTIONS 1 & 2: 0
SUM OF ALL RESPONSES TO PHQ QUESTIONS 1-9: 0

## 2023-08-28 ASSESSMENT — ANXIETY QUESTIONNAIRES
5. BEING SO RESTLESS THAT IT IS HARD TO SIT STILL: 0
4. TROUBLE RELAXING: 0
GAD7 TOTAL SCORE: 0
6. BECOMING EASILY ANNOYED OR IRRITABLE: 0
IF YOU CHECKED OFF ANY PROBLEMS ON THIS QUESTIONNAIRE, HOW DIFFICULT HAVE THESE PROBLEMS MADE IT FOR YOU TO DO YOUR WORK, TAKE CARE OF THINGS AT HOME, OR GET ALONG WITH OTHER PEOPLE: NOT DIFFICULT AT ALL
1. FEELING NERVOUS, ANXIOUS, OR ON EDGE: 0
2. NOT BEING ABLE TO STOP OR CONTROL WORRYING: 0
7. FEELING AFRAID AS IF SOMETHING AWFUL MIGHT HAPPEN: 0
3. WORRYING TOO MUCH ABOUT DIFFERENT THINGS: 0

## 2023-08-28 ASSESSMENT — SOCIAL DETERMINANTS OF HEALTH (SDOH)
WITHIN THE LAST YEAR, HAVE YOU BEEN KICKED, HIT, SLAPPED, OR OTHERWISE PHYSICALLY HURT BY YOUR PARTNER OR EX-PARTNER?: NO
HOW HARD IS IT FOR YOU TO PAY FOR THE VERY BASICS LIKE FOOD, HOUSING, MEDICAL CARE, AND HEATING?: VERY HARD
IN A TYPICAL WEEK, HOW MANY TIMES DO YOU TALK ON THE PHONE WITH FAMILY, FRIENDS, OR NEIGHBORS?: MORE THAN THREE TIMES A WEEK
HOW OFTEN DO YOU GET TOGETHER WITH FRIENDS OR RELATIVES?: MORE THAN THREE TIMES A WEEK
HOW OFTEN DO YOU ATTEND CHURCH OR RELIGIOUS SERVICES?: NEVER
WITHIN THE LAST YEAR, HAVE YOU BEEN AFRAID OF YOUR PARTNER OR EX-PARTNER?: NO
WITHIN THE LAST YEAR, HAVE YOU BEEN HUMILIATED OR EMOTIONALLY ABUSED IN OTHER WAYS BY YOUR PARTNER OR EX-PARTNER?: NO
WITHIN THE LAST YEAR, HAVE TO BEEN RAPED OR FORCED TO HAVE ANY KIND OF SEXUAL ACTIVITY BY YOUR PARTNER OR EX-PARTNER?: NO
DO YOU BELONG TO ANY CLUBS OR ORGANIZATIONS SUCH AS CHURCH GROUPS UNIONS, FRATERNAL OR ATHLETIC GROUPS, OR SCHOOL GROUPS?: NO
HOW OFTEN DO YOU ATTENT MEETINGS OF THE CLUB OR ORGANIZATION YOU BELONG TO?: NEVER

## 2023-08-28 ASSESSMENT — LIFESTYLE VARIABLES
HOW OFTEN DO YOU HAVE A DRINK CONTAINING ALCOHOL: NEVER
HOW MANY STANDARD DRINKS CONTAINING ALCOHOL DO YOU HAVE ON A TYPICAL DAY: PATIENT DOES NOT DRINK

## 2023-08-29 LAB
APPEARANCE UR: ABNORMAL
BACTERIA #/AREA URNS HPF: ABNORMAL /[HPF]
BILIRUB UR QL STRIP: NEGATIVE
CASTS URNS QL MICRO: ABNORMAL /LPF
COLOR UR: YELLOW
EPI CELLS #/AREA URNS HPF: >10 /HPF (ref 0–10)
GLUCOSE UR QL STRIP: NEGATIVE
HBA1C MFR BLD: 5.5 % (ref 4.8–5.6)
HGB UR QL STRIP: NEGATIVE
KETONES UR QL STRIP: ABNORMAL
LEUKOCYTE ESTERASE UR QL STRIP: NEGATIVE
MICRO URNS: ABNORMAL
MICRO URNS: ABNORMAL
NITRITE UR QL STRIP: NEGATIVE
PH UR STRIP: 6 [PH] (ref 5–7.5)
PROT UR QL STRIP: ABNORMAL
RBC #/AREA URNS HPF: ABNORMAL /HPF (ref 0–2)
SP GR UR STRIP: >=1.03 (ref 1–1.03)
UROBILINOGEN UR STRIP-MCNC: 1 MG/DL (ref 0.2–1)
WBC #/AREA URNS HPF: ABNORMAL /HPF (ref 0–5)

## 2023-09-17 ENCOUNTER — TELEPHONE (OUTPATIENT)
Facility: CLINIC | Age: 33
End: 2023-09-17

## 2023-09-17 NOTE — TELEPHONE ENCOUNTER
Called patient, no answer so office has left a voice message in regards to the reason for FMLA form.

## 2023-10-02 ENCOUNTER — NURSE TRIAGE (OUTPATIENT)
Dept: OTHER | Facility: CLINIC | Age: 33
End: 2023-10-02

## 2023-10-02 ENCOUNTER — HOSPITAL ENCOUNTER (EMERGENCY)
Facility: HOSPITAL | Age: 33
Discharge: HOME | End: 2023-10-02
Attending: EMERGENCY MEDICINE
Payer: MEDICAID

## 2023-10-02 ENCOUNTER — APPOINTMENT (OUTPATIENT)
Facility: HOSPITAL | Age: 33
End: 2023-10-02
Payer: MEDICAID

## 2023-10-02 VITALS
OXYGEN SATURATION: 98 % | BODY MASS INDEX: 42.32 KG/M2 | TEMPERATURE: 99 F | RESPIRATION RATE: 14 BRPM | SYSTOLIC BLOOD PRESSURE: 138 MMHG | DIASTOLIC BLOOD PRESSURE: 86 MMHG | HEIGHT: 65 IN | HEART RATE: 87 BPM

## 2023-10-02 DIAGNOSIS — R10.9 ABDOMINAL PAIN, UNSPECIFIED ABDOMINAL LOCATION: Primary | ICD-10-CM

## 2023-10-02 DIAGNOSIS — K92.1: ICD-10-CM

## 2023-10-02 LAB
ALBUMIN SERPL-MCNC: 3.5 G/DL (ref 3.5–5)
ALBUMIN/GLOB SERPL: 0.9 (ref 1.1–2.2)
ALP SERPL-CCNC: 96 U/L (ref 45–117)
ALT SERPL-CCNC: 21 U/L (ref 12–78)
ANION GAP SERPL CALC-SCNC: 3 MMOL/L (ref 5–15)
APPEARANCE UR: CLEAR
AST SERPL-CCNC: 11 U/L (ref 15–37)
BACTERIA URNS QL MICRO: NEGATIVE /HPF
BASOPHILS # BLD: 0.1 K/UL (ref 0–0.1)
BASOPHILS NFR BLD: 0 % (ref 0–1)
BILIRUB SERPL-MCNC: 0.4 MG/DL (ref 0.2–1)
BILIRUB UR QL: NEGATIVE
BUN SERPL-MCNC: 10 MG/DL (ref 6–20)
BUN/CREAT SERPL: 16 (ref 12–20)
CALCIUM SERPL-MCNC: 8.7 MG/DL (ref 8.5–10.1)
CHLORIDE SERPL-SCNC: 108 MMOL/L (ref 97–108)
CO2 SERPL-SCNC: 25 MMOL/L (ref 21–32)
COLOR UR: NORMAL
COMMENT:: NORMAL
CREAT SERPL-MCNC: 0.61 MG/DL (ref 0.55–1.02)
DIFFERENTIAL METHOD BLD: ABNORMAL
EOSINOPHIL # BLD: 0.1 K/UL (ref 0–0.4)
EOSINOPHIL NFR BLD: 1 % (ref 0–7)
EPITH CASTS URNS QL MICRO: NORMAL /LPF
ERYTHROCYTE [DISTWIDTH] IN BLOOD BY AUTOMATED COUNT: 13.8 % (ref 11.5–14.5)
GLOBULIN SER CALC-MCNC: 3.8 G/DL (ref 2–4)
GLUCOSE SERPL-MCNC: 103 MG/DL (ref 65–100)
GLUCOSE UR STRIP.AUTO-MCNC: NEGATIVE MG/DL
HCG UR QL: NEGATIVE
HCT VFR BLD AUTO: 41 % (ref 35–47)
HEMOCCULT STL QL: POSITIVE
HGB BLD-MCNC: 13.2 G/DL (ref 11.5–16)
HGB UR QL STRIP: NEGATIVE
HYALINE CASTS URNS QL MICRO: NORMAL /LPF (ref 0–5)
IMM GRANULOCYTES # BLD AUTO: 0 K/UL (ref 0–0.04)
IMM GRANULOCYTES NFR BLD AUTO: 0 % (ref 0–0.5)
KETONES UR QL STRIP.AUTO: NEGATIVE MG/DL
LEUKOCYTE ESTERASE UR QL STRIP.AUTO: NEGATIVE
LIPASE SERPL-CCNC: 80 U/L (ref 73–393)
LYMPHOCYTES # BLD: 2.8 K/UL (ref 0.8–3.5)
LYMPHOCYTES NFR BLD: 24 % (ref 12–49)
MAGNESIUM SERPL-MCNC: 1.8 MG/DL (ref 1.6–2.4)
MCH RBC QN AUTO: 27.6 PG (ref 26–34)
MCHC RBC AUTO-ENTMCNC: 32.2 G/DL (ref 30–36.5)
MCV RBC AUTO: 85.8 FL (ref 80–99)
MONOCYTES # BLD: 0.9 K/UL (ref 0–1)
MONOCYTES NFR BLD: 8 % (ref 5–13)
NEUTS SEG # BLD: 7.5 K/UL (ref 1.8–8)
NEUTS SEG NFR BLD: 67 % (ref 32–75)
NITRITE UR QL STRIP.AUTO: NEGATIVE
NRBC # BLD: 0 K/UL (ref 0–0.01)
NRBC BLD-RTO: 0 PER 100 WBC
PH UR STRIP: 6.5 (ref 5–8)
PLATELET # BLD AUTO: 196 K/UL (ref 150–400)
PMV BLD AUTO: 11.3 FL (ref 8.9–12.9)
POTASSIUM SERPL-SCNC: 3.9 MMOL/L (ref 3.5–5.1)
PROT SERPL-MCNC: 7.3 G/DL (ref 6.4–8.2)
PROT UR STRIP-MCNC: NEGATIVE MG/DL
RBC # BLD AUTO: 4.78 M/UL (ref 3.8–5.2)
RBC #/AREA URNS HPF: NORMAL /HPF (ref 0–5)
SODIUM SERPL-SCNC: 136 MMOL/L (ref 136–145)
SP GR UR REFRACTOMETRY: 1.02 (ref 1–1.03)
SPECIMEN HOLD: NORMAL
SPECIMEN HOLD: NORMAL
UROBILINOGEN UR QL STRIP.AUTO: 1 EU/DL (ref 0.2–1)
WBC # BLD AUTO: 11.3 K/UL (ref 3.6–11)
WBC URNS QL MICRO: NORMAL /HPF (ref 0–4)

## 2023-10-02 PROCEDURE — 85025 COMPLETE CBC W/AUTO DIFF WBC: CPT

## 2023-10-02 PROCEDURE — A4216 STERILE WATER/SALINE, 10 ML: HCPCS | Performed by: EMERGENCY MEDICINE

## 2023-10-02 PROCEDURE — 96374 THER/PROPH/DIAG INJ IV PUSH: CPT

## 2023-10-02 PROCEDURE — 6360000002 HC RX W HCPCS: Performed by: EMERGENCY MEDICINE

## 2023-10-02 PROCEDURE — 74177 CT ABD & PELVIS W/CONTRAST: CPT

## 2023-10-02 PROCEDURE — 83735 ASSAY OF MAGNESIUM: CPT

## 2023-10-02 PROCEDURE — 80053 COMPREHEN METABOLIC PANEL: CPT

## 2023-10-02 PROCEDURE — 99285 EMERGENCY DEPT VISIT HI MDM: CPT

## 2023-10-02 PROCEDURE — 2580000003 HC RX 258: Performed by: EMERGENCY MEDICINE

## 2023-10-02 PROCEDURE — 83690 ASSAY OF LIPASE: CPT

## 2023-10-02 PROCEDURE — C9113 INJ PANTOPRAZOLE SODIUM, VIA: HCPCS | Performed by: EMERGENCY MEDICINE

## 2023-10-02 PROCEDURE — 82272 OCCULT BLD FECES 1-3 TESTS: CPT

## 2023-10-02 PROCEDURE — 81001 URINALYSIS AUTO W/SCOPE: CPT

## 2023-10-02 PROCEDURE — 36415 COLL VENOUS BLD VENIPUNCTURE: CPT

## 2023-10-02 PROCEDURE — 96372 THER/PROPH/DIAG INJ SC/IM: CPT

## 2023-10-02 PROCEDURE — 81025 URINE PREGNANCY TEST: CPT

## 2023-10-02 PROCEDURE — 6360000004 HC RX CONTRAST MEDICATION

## 2023-10-02 PROCEDURE — 6370000000 HC RX 637 (ALT 250 FOR IP): Performed by: EMERGENCY MEDICINE

## 2023-10-02 RX ORDER — PANTOPRAZOLE SODIUM 40 MG/1
40 TABLET, DELAYED RELEASE ORAL
Qty: 30 TABLET | Refills: 1 | Status: SHIPPED | OUTPATIENT
Start: 2023-10-02

## 2023-10-02 RX ORDER — 0.9 % SODIUM CHLORIDE 0.9 %
1000 INTRAVENOUS SOLUTION INTRAVENOUS ONCE
Status: COMPLETED | OUTPATIENT
Start: 2023-10-02 | End: 2023-10-02

## 2023-10-02 RX ORDER — DICYCLOMINE HYDROCHLORIDE 10 MG/ML
20 INJECTION INTRAMUSCULAR
Status: COMPLETED | OUTPATIENT
Start: 2023-10-02 | End: 2023-10-02

## 2023-10-02 RX ORDER — DICYCLOMINE HCL 20 MG
20 TABLET ORAL 4 TIMES DAILY
Qty: 20 TABLET | Refills: 0 | Status: SHIPPED | OUTPATIENT
Start: 2023-10-02

## 2023-10-02 RX ORDER — FAMOTIDINE 20 MG/1
20 TABLET, FILM COATED ORAL
Status: COMPLETED | OUTPATIENT
Start: 2023-10-02 | End: 2023-10-02

## 2023-10-02 RX ADMIN — FAMOTIDINE 20 MG: 20 TABLET ORAL at 17:39

## 2023-10-02 RX ADMIN — SODIUM CHLORIDE 40 MG: 9 INJECTION, SOLUTION INTRAMUSCULAR; INTRAVENOUS; SUBCUTANEOUS at 15:35

## 2023-10-02 RX ADMIN — SODIUM CHLORIDE 1000 ML: 9 INJECTION, SOLUTION INTRAVENOUS at 12:03

## 2023-10-02 RX ADMIN — IOPAMIDOL 100 ML: 755 INJECTION, SOLUTION INTRAVENOUS at 12:59

## 2023-10-02 RX ADMIN — DICYCLOMINE HYDROCHLORIDE 20 MG: 10 INJECTION, SOLUTION INTRAMUSCULAR at 17:39

## 2023-10-02 ASSESSMENT — ENCOUNTER SYMPTOMS
DIARRHEA: 0
SHORTNESS OF BREATH: 0
BACK PAIN: 0
NAUSEA: 0
VOMITING: 0
BLOOD IN STOOL: 1
COUGH: 0
SORE THROAT: 0

## 2023-10-02 ASSESSMENT — PAIN DESCRIPTION - LOCATION: LOCATION: ABDOMEN

## 2023-10-02 ASSESSMENT — PAIN - FUNCTIONAL ASSESSMENT: PAIN_FUNCTIONAL_ASSESSMENT: 0-10

## 2023-10-02 ASSESSMENT — PAIN SCALES - GENERAL: PAINLEVEL_OUTOF10: 6

## 2023-10-02 NOTE — TELEPHONE ENCOUNTER
Location of patient: [address] [facility] [place] call from Demetrio Eng at [facility] with [employer]. Subjective: Caller states \"I have blood in my stool\"     Current Symptoms: Dark red blood in stool with clots. No black or tarry stool. Cramping abdominal pain at time of call. Onset: 1 day ago; waxing and waning    Associated Symptoms: NA    Pain Severity: 6/10; cramping; constant, moderate    Temperature: denies fever     LMP: NA Pregnant: NA    Recommended disposition: Go to ED/UCC Now (Or to Office with PCP Approval)    Care advice provided, patient verbalizes understanding; denies any other questions or concerns; instructed to call back for any new or worsening symptoms. Triage RN unable to reach PCP office for second level triage. Advised caller sending message to PCP as a high priority call and she should get a call back. If new or worsening symptoms or no call back advised to go to ER for an evaluation. She agreed with plan. Message sent to PCP as high priority. Attention Provider: Thank you for allowing me to participate in the care of your patient. The patient was connected to triage in response to information provided to the ECC/PSC. Please do not respond through this encounter as the response is not directed to a shared pool.     Reason for Disposition   Constant abdominal pain lasting > 2 hours    Protocols used: Rectal Bleeding-ADULT-[place]

## 2023-10-02 NOTE — ED TRIAGE NOTES
Patient arrives from home with two bloody stools since yesterday . Patient denies any pain, denies any constipation or straining with defecation. Will obtain blood work, urine.

## 2023-10-02 NOTE — ED PROVIDER NOTES
[facility] EMERGENCY DEP  EMERGENCY DEPARTMENT ENCOUNTER      Pt Name: Vanessa Thompson  MRN: [id number]  [address] [facility] [place] 1990  Date of evaluation: 10/2/2023  Provider: ARGENIS Brice NP    [address] [facility]       Chief Complaint   Patient presents with    Rectal Bleeding                HISTORY OF PRESENT ILLNESS   (Location/Symptom, Timing/Onset, Context/Setting, Quality, Duration, Modifying Factors, Severity)  Note limiting factors. HPI  Patient is a 81-year-old female with past medical history significant for bipolar disorder, anxiety, epigastric abdominal pain, iron deficiency anemia, obesity, sleep apnea and neurogenic bladder who presents to the ED reporting 2 episodes of bloody stool since yesterday. Denies any fever, dizziness, lightheadedness, difficulty breathing, difficulty swallowing, SOB or chest pain. Denies any nausea, vomiting, urinary symptoms, constipation or diarrhea. Pt. Reports that she has not had any medications today prior to arrival..   Old charts reviewed. Review of External Medical Records:     Nursing Notes were reviewed. REVIEW OF SYSTEMS    (2-9 systems for level 4, 10 or more for level 5)     Review of Systems   Constitutional:  Negative for activity change, appetite change, fever and unexpected weight change. HENT:  Negative for congestion and sore throat. Eyes:  Negative for visual disturbance. Respiratory:  Negative for cough and shortness of breath. Cardiovascular:  Negative for chest pain, palpitations and leg swelling. Gastrointestinal:  Positive for blood in stool. Negative for diarrhea, nausea and vomiting. Genitourinary:  Negative for difficulty urinating and dysuria. Musculoskeletal:  Negative for back pain. Skin:  Negative for rash. Neurological:  Negative for dizziness, light-headedness and headaches. All other systems reviewed and are negative. Except as noted above the remainder of the review of systems was reviewed and negative.

## 2023-11-03 ENCOUNTER — APPOINTMENT (OUTPATIENT)
Facility: HOSPITAL | Age: 33
End: 2023-11-03
Payer: MEDICAID

## 2023-11-03 ENCOUNTER — HOSPITAL ENCOUNTER (EMERGENCY)
Facility: HOSPITAL | Age: 33
Discharge: HOME | End: 2023-11-03
Attending: STUDENT IN AN ORGANIZED HEALTH CARE EDUCATION/TRAINING PROGRAM
Payer: MEDICAID

## 2023-11-03 VITALS
BODY MASS INDEX: 41.65 KG/M2 | WEIGHT: 250 LBS | HEART RATE: 87 BPM | DIASTOLIC BLOOD PRESSURE: 100 MMHG | OXYGEN SATURATION: 97 % | RESPIRATION RATE: 18 BRPM | TEMPERATURE: 98.4 F | SYSTOLIC BLOOD PRESSURE: 173 MMHG | HEIGHT: 65 IN

## 2023-11-03 DIAGNOSIS — J06.9 VIRAL URI WITH COUGH: Primary | ICD-10-CM

## 2023-11-03 DIAGNOSIS — I10 HYPERTENSION, UNSPECIFIED TYPE: ICD-10-CM

## 2023-11-03 DIAGNOSIS — J45.901 EXACERBATION OF ASTHMA, UNSPECIFIED ASTHMA SEVERITY, UNSPECIFIED WHETHER PERSISTENT: ICD-10-CM

## 2023-11-03 LAB
DEPRECATED S PYO AG THROAT QL EIA: NEGATIVE
FLUAV RNA SPEC QL NAA+PROBE: NOT DETECTED
FLUBV RNA SPEC QL NAA+PROBE: NOT DETECTED
SARS-COV-2 RNA RESP QL NAA+PROBE: NOT DETECTED

## 2023-11-03 PROCEDURE — 71045 X-RAY EXAM CHEST 1 VIEW: CPT

## 2023-11-03 PROCEDURE — 96372 THER/PROPH/DIAG INJ SC/IM: CPT

## 2023-11-03 PROCEDURE — 87070 CULTURE OTHR SPECIMN AEROBIC: CPT

## 2023-11-03 PROCEDURE — 99284 EMERGENCY DEPT VISIT MOD MDM: CPT

## 2023-11-03 PROCEDURE — 6370000000 HC RX 637 (ALT 250 FOR IP): Performed by: STUDENT IN AN ORGANIZED HEALTH CARE EDUCATION/TRAINING PROGRAM

## 2023-11-03 PROCEDURE — 87880 STREP A ASSAY W/OPTIC: CPT

## 2023-11-03 PROCEDURE — 6360000002 HC RX W HCPCS: Performed by: STUDENT IN AN ORGANIZED HEALTH CARE EDUCATION/TRAINING PROGRAM

## 2023-11-03 PROCEDURE — 87636 SARSCOV2 & INF A&B AMP PRB: CPT

## 2023-11-03 RX ORDER — GUAIFENESIN 600 MG/1
600 TABLET, EXTENDED RELEASE ORAL 2 TIMES DAILY
Qty: 10 TABLET | Refills: 0 | Status: SHIPPED | OUTPATIENT
Start: 2023-11-03 | End: 2023-11-08

## 2023-11-03 RX ORDER — PREDNISONE 20 MG/1
40 TABLET ORAL ONCE
Status: COMPLETED | OUTPATIENT
Start: 2023-11-03 | End: 2023-11-03

## 2023-11-03 RX ORDER — ALBUTEROL SULFATE 90 UG/1
2 AEROSOL, METERED RESPIRATORY (INHALATION) EVERY 6 HOURS PRN
Qty: 18 G | Refills: 3 | Status: SHIPPED | OUTPATIENT
Start: 2023-11-03

## 2023-11-03 RX ORDER — PREDNISONE 20 MG/1
40 TABLET ORAL DAILY
Qty: 10 TABLET | Refills: 0 | Status: SHIPPED | OUTPATIENT
Start: 2023-11-03 | End: 2023-11-08

## 2023-11-03 RX ORDER — ALBUTEROL SULFATE 90 UG/1
4 AEROSOL, METERED RESPIRATORY (INHALATION) ONCE
Status: COMPLETED | OUTPATIENT
Start: 2023-11-03 | End: 2023-11-03

## 2023-11-03 RX ORDER — KETOROLAC TROMETHAMINE 30 MG/ML
30 INJECTION, SOLUTION INTRAMUSCULAR; INTRAVENOUS
Status: COMPLETED | OUTPATIENT
Start: 2023-11-03 | End: 2023-11-03

## 2023-11-03 RX ADMIN — KETOROLAC TROMETHAMINE 30 MG: 30 INJECTION, SOLUTION INTRAMUSCULAR; INTRAVENOUS at 08:47

## 2023-11-03 RX ADMIN — ALBUTEROL SULFATE 4 PUFF: 90 AEROSOL, METERED RESPIRATORY (INHALATION) at 08:47

## 2023-11-03 RX ADMIN — PREDNISONE 40 MG: 20 TABLET ORAL at 08:47

## 2023-11-03 ASSESSMENT — PAIN - FUNCTIONAL ASSESSMENT: PAIN_FUNCTIONAL_ASSESSMENT: 0-10

## 2023-11-03 ASSESSMENT — PAIN DESCRIPTION - LOCATION: LOCATION: GENERALIZED

## 2023-11-03 ASSESSMENT — PAIN DESCRIPTION - DESCRIPTORS: DESCRIPTORS: ACHING

## 2023-11-03 ASSESSMENT — PAIN SCALES - GENERAL: PAINLEVEL_OUTOF10: 6

## 2023-11-03 NOTE — ED NOTES
Discharge instructions given to patient by RN. Pt has been given counseling regarding at home treatment plan. Pt verbalizes understanding of need to seek further treatment if symptoms worsen. Pt ambulated off of unit in no signs of distress.       Estephanie Monroe RN  11/03/23 [id number]

## 2023-11-05 LAB
BACTERIA SPEC CULT: NORMAL
SERVICE CMNT-IMP: NORMAL

## 2023-12-06 ENCOUNTER — TELEPHONE (OUTPATIENT)
Age: 33
End: 2023-12-06

## 2023-12-06 ENCOUNTER — APPOINTMENT (OUTPATIENT)
Facility: HOSPITAL | Age: 33
End: 2023-12-06
Payer: MEDICAID

## 2023-12-06 ENCOUNTER — CLINICAL DOCUMENTATION (OUTPATIENT)
Age: 33
End: 2023-12-06

## 2023-12-06 ENCOUNTER — HOSPITAL ENCOUNTER (EMERGENCY)
Facility: HOSPITAL | Age: 33
Discharge: HOME | End: 2023-12-06
Attending: STUDENT IN AN ORGANIZED HEALTH CARE EDUCATION/TRAINING PROGRAM
Payer: MEDICAID

## 2023-12-06 VITALS
TEMPERATURE: 98.6 F | SYSTOLIC BLOOD PRESSURE: 139 MMHG | BODY MASS INDEX: 38.32 KG/M2 | OXYGEN SATURATION: 97 % | DIASTOLIC BLOOD PRESSURE: 80 MMHG | HEART RATE: 96 BPM | HEIGHT: 65 IN | RESPIRATION RATE: 18 BRPM | WEIGHT: 230 LBS

## 2023-12-06 DIAGNOSIS — J10.1 INFLUENZA A: Primary | ICD-10-CM

## 2023-12-06 DIAGNOSIS — R05.1 ACUTE COUGH: ICD-10-CM

## 2023-12-06 LAB
FLUAV RNA SPEC QL NAA+PROBE: DETECTED
FLUBV RNA SPEC QL NAA+PROBE: NOT DETECTED
SARS-COV-2 RNA RESP QL NAA+PROBE: NOT DETECTED

## 2023-12-06 PROCEDURE — 94640 AIRWAY INHALATION TREATMENT: CPT

## 2023-12-06 PROCEDURE — 87636 SARSCOV2 & INF A&B AMP PRB: CPT

## 2023-12-06 PROCEDURE — 6370000000 HC RX 637 (ALT 250 FOR IP): Performed by: STUDENT IN AN ORGANIZED HEALTH CARE EDUCATION/TRAINING PROGRAM

## 2023-12-06 PROCEDURE — 71045 X-RAY EXAM CHEST 1 VIEW: CPT

## 2023-12-06 PROCEDURE — 99284 EMERGENCY DEPT VISIT MOD MDM: CPT

## 2023-12-06 RX ORDER — OSELTAMIVIR PHOSPHATE 75 MG/1
75 CAPSULE ORAL ONCE
Status: COMPLETED | OUTPATIENT
Start: 2023-12-06 | End: 2023-12-06

## 2023-12-06 RX ORDER — PREDNISONE 20 MG/1
40 TABLET ORAL ONCE
Status: COMPLETED | OUTPATIENT
Start: 2023-12-06 | End: 2023-12-06

## 2023-12-06 RX ORDER — OSELTAMIVIR PHOSPHATE 75 MG/1
75 CAPSULE ORAL 2 TIMES DAILY
Qty: 10 CAPSULE | Refills: 0 | Status: SHIPPED | OUTPATIENT
Start: 2023-12-06 | End: 2023-12-11

## 2023-12-06 RX ORDER — ALBUTEROL SULFATE 90 UG/1
2 AEROSOL, METERED RESPIRATORY (INHALATION) ONCE
Status: COMPLETED | OUTPATIENT
Start: 2023-12-06 | End: 2023-12-06

## 2023-12-06 RX ORDER — IPRATROPIUM BROMIDE AND ALBUTEROL SULFATE 2.5; .5 MG/3ML; MG/3ML
1 SOLUTION RESPIRATORY (INHALATION)
Status: COMPLETED | OUTPATIENT
Start: 2023-12-06 | End: 2023-12-06

## 2023-12-06 RX ORDER — PREDNISONE 20 MG/1
40 TABLET ORAL DAILY
Qty: 10 TABLET | Refills: 0 | Status: SHIPPED | OUTPATIENT
Start: 2023-12-06 | End: 2023-12-11

## 2023-12-06 RX ORDER — BENZONATATE 100 MG/1
100 CAPSULE ORAL 3 TIMES DAILY PRN
Qty: 20 CAPSULE | Refills: 0 | Status: SHIPPED | OUTPATIENT
Start: 2023-12-06 | End: 2023-12-13

## 2023-12-06 RX ADMIN — OSELTAMIVIR PHOSPHATE 75 MG: 75 CAPSULE ORAL at 08:18

## 2023-12-06 RX ADMIN — IPRATROPIUM BROMIDE AND ALBUTEROL SULFATE 1 DOSE: 2.5; .5 SOLUTION RESPIRATORY (INHALATION) at 07:27

## 2023-12-06 RX ADMIN — ALBUTEROL SULFATE 2 PUFF: 90 AEROSOL, METERED RESPIRATORY (INHALATION) at 08:18

## 2023-12-06 RX ADMIN — PREDNISONE 40 MG: 20 TABLET ORAL at 07:27

## 2023-12-06 ASSESSMENT — PAIN DESCRIPTION - ORIENTATION: ORIENTATION: RIGHT;LEFT;UPPER;LOWER

## 2023-12-06 ASSESSMENT — PAIN DESCRIPTION - LOCATION: LOCATION: GENERALIZED

## 2023-12-06 ASSESSMENT — PAIN DESCRIPTION - DESCRIPTORS: DESCRIPTORS: ACHING

## 2023-12-06 ASSESSMENT — PAIN SCALES - GENERAL: PAINLEVEL_OUTOF10: 8

## 2023-12-06 ASSESSMENT — PAIN - FUNCTIONAL ASSESSMENT: PAIN_FUNCTIONAL_ASSESSMENT: 0-10

## 2023-12-06 NOTE — PROGRESS NOTES
Keren Santacruz from [facility] contacted requesting updated phone number for patient. Keren Santacruz provided three points of patient identification, name, , address.

## 2023-12-06 NOTE — ED TRIAGE NOTES
Pt presents to ED with c/o cough, sore throat, SOB, body aches  Pt states her children have the flu  Pt endorses taking Tylenol and Theraflu last night, cough syrup at 230am

## 2023-12-06 NOTE — ED PROVIDER NOTES
[facility] EMERGENCY DEPT  EMERGENCY DEPARTMENT ENCOUNTER       Pt Name: Ethan Albarran  MRN: [id number]  [address] [facility] 1990  Date of evaluation: 12/6/2023  Provider: Dennis Walden MD   PCP: Luke Zacarias MD  Note Started: 7:24 AM EST 12/6/23     CHIEF COMPLAINT       Chief Complaint   Patient presents with    Cough     Children have flu, pt hx bronchitis     HISTORY OF PRESENT ILLNESS: 1 or more elements      History From: patient, History limited by: none     Ethan Albarran is a 29 y.o. female with medical history of asthma presents to the emergency departments with dry cough that has been relatively severe. Has been going on for the past 24 hours. She also reports that she has bodyaches and a runny nose. Her kids have similar symptoms and then diagnosed with the flu. They have taken Tamiflu. She has no fevers or chills, no neck stiffness, no rash. Had a similar presentation for viral upper respiratory infection when she was last seen emergency department she reports improved without prednisone on discharge. Reports history of bilateral tubal ligation. Please See MDM for Additional Details of the HPI/PMH  Nursing Notes were all reviewed and agreed with or any disagreements were addressed in the HPI. REVIEW OF SYSTEMS        Positives and Pertinent negatives as per HPI.     PAST HISTORY     Past Medical History:  Past Medical History:   Diagnosis Date    Abnormal Pap smear     Complication of anesthesia     never had    Depression     Mild episode of recurrent major depressive disorder ([address]) 4/21/2020    Sleep apnea        Past Surgical History:  Past Surgical History:   Procedure Laterality Date    GYN      c section    TUBAL LIGATION  12/14/2021       Family History:  Family History   Problem Relation Age of Onset    Diabetes Paternal Grandmother     Diabetes Father     Hypertension Father     Cancer Maternal Grandfather        Social History:  Social History     Tobacco Use    Smoking

## 2024-02-01 ENCOUNTER — CLINICAL DOCUMENTATION (OUTPATIENT)
Age: 34
End: 2024-02-01

## 2024-02-01 ENCOUNTER — TELEMEDICINE (OUTPATIENT)
Age: 34
End: 2024-02-01
Payer: MEDICAID

## 2024-02-01 DIAGNOSIS — G47.33 OSA (OBSTRUCTIVE SLEEP APNEA): Primary | ICD-10-CM

## 2024-02-01 PROCEDURE — 99213 OFFICE O/P EST LOW 20 MIN: CPT | Performed by: INTERNAL MEDICINE

## 2024-02-01 ASSESSMENT — SLEEP AND FATIGUE QUESTIONNAIRES
DO YOU GENERALLY HAVE DIFFICULTY REMEMBERING THINGS BECAUSE YOU ARE SLEEPY OR TIRED: YES, MODERATE
HOW LIKELY ARE YOU TO NOD OFF OR FALL ASLEEP WHILE LYING DOWN TO REST IN THE AFTERNOON WHEN CIRCUMSTANCES PERMIT: 2
HAS YOUR MOOD BEEN AFFECTED BECAUSE YOU ARE SLEEPY OR TIRED: YES, LITTLE
DO YOU HAVE DIFFICULTY VISITING YOUR FAMILY OR FRIENDS IN THEIR HOME BECAUSE YOU BECOME SLEEPY OR TIRED: YES, A LITTLE
HOW LIKELY ARE YOU TO NOD OFF OR FALL ASLEEP WHEN YOU ARE A PASSENGER IN A CAR FOR AN HOUR WITHOUT A BREAK: SLIGHT CHANCE OF DOZING
HOW LIKELY ARE YOU TO NOD OFF OR FALL ASLEEP WHEN YOU ARE A PASSENGER IN A CAR FOR AN HOUR WITHOUT A BREAK: 1
ESS TOTAL SCORE: 9
HOW LIKELY ARE YOU TO NOD OFF OR FALL ASLEEP WHILE LYING DOWN TO REST IN THE AFTERNOON WHEN CIRCUMSTANCES PERMIT: MODERATE CHANCE OF DOZING
HOW LIKELY ARE YOU TO NOD OFF OR FALL ASLEEP WHILE SITTING QUIETLY AFTER LUNCH WITHOUT ALCOHOL: SLIGHT CHANCE OF DOZING
DO YOU HAVE DIFFICULTY OPERATING A MOTOR VEHICLE FOR LONG DISTANCES (GREATER THAN 100 MILES) BECAUSE YOU BECOME SLEEPY: YES, A LITTLE
DO YOU HAVE DIFFICULTY BEING AS ACTIVE AS YOU WANT TO BE IN THE MORNING BECAUSE YOU ARE SLEEPY OR TIRED: YES, LITTLE
HOW LIKELY ARE YOU TO NOD OFF OR FALL ASLEEP WHILE SITTING AND READING: 2
HAS YOUR RELATIONSHIP WITH FAMILY, FRIENDS OR WORK COLLEAGUES BEEN AFFECTED BECAUSE YOU ARE SLEEPY OR TIRED: YES, A LITTLE
HOW LIKELY ARE YOU TO NOD OFF OR FALL ASLEEP WHILE SITTING AND READING: MODERATE CHANCE OF DOZING
DO YOU HAVE DIFFICULTY WATCHING A MOVIE OR VIDEO BECAUSE YOU BECOME SLEEPY OR TIRED: YES, A LITTLE
DO YOU HAVE DIFFICULTY CONCENTRATING ON THE THINGS YOU DO BECAUSE YOU ARE SLEEPY OR TIRED: YES, MODERATE
HOW LIKELY ARE YOU TO NOD OFF OR FALL ASLEEP WHILE WATCHING TV: MODERATE CHANCE OF DOZING
HOW LIKELY ARE YOU TO NOD OFF OR FALL ASLEEP WHILE SITTING AND TALKING TO SOMEONE: 1
HOW LIKELY ARE YOU TO NOD OFF OR FALL ASLEEP WHILE WATCHING TV: 2
HOW LIKELY ARE YOU TO NOD OFF OR FALL ASLEEP WHILE SITTING QUIETLY AFTER LUNCH WITHOUT ALCOHOL: 1
HOW LIKELY ARE YOU TO NOD OFF OR FALL ASLEEP WHILE SITTING AND TALKING TO SOMEONE: SLIGHT CHANCE OF DOZING
HOW LIKELY ARE YOU TO NOD OFF OR FALL ASLEEP IN A CAR, WHILE STOPPED FOR A FEW MINUTES IN TRAFFIC: WOULD NEVER DOZE
HOW LIKELY ARE YOU TO NOD OFF OR FALL ASLEEP WHILE SITTING INACTIVE IN A PUBLIC PLACE: WOULD NEVER DOZE
DO YOU HAVE DIFFICULTY BEING AS ACTIVE AS YOU WANT TO BE IN THE EVENING BECAUSE YOU ARE SLEEPY OR TIRED: YES, LITTLE
HOW LIKELY ARE YOU TO NOD OFF OR FALL ASLEEP IN A CAR, WHILE STOPPED FOR A FEW MINUTES IN TRAFFIC: 0
HOW LIKELY ARE YOU TO NOD OFF OR FALL ASLEEP WHILE SITTING INACTIVE IN A PUBLIC PLACE: 0
FOSQ SCORE: 14.5
DO YOU HAVE DIFFICULTY OPERATING A MOTOR VEHICLE FOR SHORT DISTANCES (LESS THAN 100 MILES) BECAUSE YOU BECOME SLEEPY: NO

## 2024-02-01 NOTE — PATIENT INSTRUCTIONS
[address]  [place] [address]  Tel.  [phone]  Fax. [phone] [address]  [place] [address]  Tel.  [phone]  Fax. [phone] [address] [place] [address]  Tel.  [phone]  Fax. [phone]     Learning About CPAP for Sleep Apnea  What is CPAP?              CPAP is a small machine that you use at home every night while you sleep. It increases air pressure in your throat to keep your airway open. When you have sleep apnea, this can help you sleep better so you feel much better. CPAP stands for \"continuous positive airway pressure.\"  The CPAP machine will have one of the following:  A mask that covers your nose and mouth  Prongs that fit into your nose  A mask that covers your nose only, the most common type. This type is called NCPAP. The N stands for \"nasal.\"  Why is it done?  CPAP is usually the best treatment for obstructive sleep apnea. It is the first treatment choice and the most widely used. Your doctor may suggest CPAP if you have:  Moderate to severe sleep apnea.  Sleep apnea and coronary artery disease (CAD) or heart failure.  How does it help?  CPAP can help you have more normal sleep, so you feel less sleepy and more alert during the daytime.  CPAP may help keep heart failure or other heart problems from getting worse.  NCPAP may help lower your blood pressure.  If you use CPAP, your bed partner may also sleep better because you are not snoring or restless.  What are the side effects?  Some people who use CPAP have:  A dry or stuffy nose and a sore throat.  Irritated skin on the face.  Sore eyes.  Bloating.  If you have any of these problems, work with your doctor to fix them. Here are some things you can try:  Be sure the mask or nasal prongs fit well.  See if your doctor can adjust the pressure of your CPAP.  If your nose is dry, try a humidifier.  If your nose is runny or stuffy, try decongestant medicine or a steroid

## 2024-02-01 NOTE — PROGRESS NOTES
loss program through appropriate diet and exercise regimen as significant weight reduction has been shown to reduce severity of obstructive sleep apnea.     SUBJECTIVE/OBJECTIVE:    Monica Hemphill is an 33 y.o. female referred for evaluation for a sleep disorder. She was initially evaluated on 04-06-23 with complaints of snoring associated with periods of not breathing, awakening in the middle of the night because of urination. She reports of poor sleep quality which was not restorative.    Home Sleep Apnea Test (HSAT) performed on 06-04-23 showed an AHI of 15.2/hr with a lowest SpO2 of 78%, duration of SpO2 < 88% 6.4 minutes.  A total of 117 events were scored, all of which occurred in supine position.    She was prescribed an APAP device on 06-15-23. She received the CPAP device but did not use it as she did not know how to use the device which was later returned to the DME supplier at their request. Her weight remains essentially unchanged and symptoms remain the same. She would like to restart using the CPAP device.     [place] Sleepiness Score: 9  Modified F.O.S.Q. Score Total / 2: (P) 14.5.      No Known Allergies      Current Outpatient Medications:     albuterol sulfate HFA (PROVENTIL;VENTOLIN;PROAIR) 108 (90 Base) MCG/ACT inhaler, Inhale 2 puffs into the lungs every 6 hours as needed for Wheezing, Disp: 18 g, Rfl: 3    busPIRone (BUSPAR) 10 MG tablet, Take 1 tablet by mouth in the morning and 1 tablet at noon and 1 tablet in the evening., Disp: , Rfl:     lurasidone (LATUDA) 40 MG TABS tablet, Take by mouth daily (with breakfast), Disp: , Rfl:     pantoprazole (PROTONIX) 40 MG tablet, Take 1 tablet by mouth every morning (before breakfast) (Patient not taking: Reported on 2/1/2024), Disp: 30 tablet, Rfl: 1    dicyclomine (BENTYL) 20 MG tablet, Take 1 tablet by mouth 4 times daily (Patient not taking: Reported on 2/1/2024), Disp: 20 tablet, Rfl: 0    fluticasone (FLONASE) 50 MCG/ACT nasal spray, 2 sprays

## 2024-02-27 ENCOUNTER — HOSPITAL ENCOUNTER (EMERGENCY)
Facility: HOSPITAL | Age: 34
Discharge: HOME | End: 2024-02-27
Attending: STUDENT IN AN ORGANIZED HEALTH CARE EDUCATION/TRAINING PROGRAM
Payer: MEDICAID

## 2024-02-27 VITALS
DIASTOLIC BLOOD PRESSURE: 93 MMHG | OXYGEN SATURATION: 98 % | SYSTOLIC BLOOD PRESSURE: 143 MMHG | HEIGHT: 65 IN | BODY MASS INDEX: 38.32 KG/M2 | TEMPERATURE: 98.3 F | HEART RATE: 94 BPM | RESPIRATION RATE: 20 BRPM | WEIGHT: 230 LBS

## 2024-02-27 DIAGNOSIS — M62.830 BACK SPASM: Primary | ICD-10-CM

## 2024-02-27 DIAGNOSIS — S46.911A STRAIN OF RIGHT SHOULDER, INITIAL ENCOUNTER: ICD-10-CM

## 2024-02-27 PROCEDURE — 99283 EMERGENCY DEPT VISIT LOW MDM: CPT

## 2024-02-27 PROCEDURE — 6370000000 HC RX 637 (ALT 250 FOR IP): Performed by: STUDENT IN AN ORGANIZED HEALTH CARE EDUCATION/TRAINING PROGRAM

## 2024-02-27 RX ORDER — ACETAMINOPHEN 500 MG
1000 TABLET ORAL
Status: COMPLETED | OUTPATIENT
Start: 2024-02-27 | End: 2024-02-27

## 2024-02-27 RX ORDER — METHOCARBAMOL 750 MG/1
750 TABLET, FILM COATED ORAL 4 TIMES DAILY
Qty: 40 TABLET | Refills: 0 | Status: SHIPPED | OUTPATIENT
Start: 2024-02-27 | End: 2024-03-08

## 2024-02-27 RX ORDER — IBUPROFEN 800 MG/1
800 TABLET ORAL EVERY 6 HOURS PRN
Qty: 56 TABLET | Refills: 0 | Status: SHIPPED | OUTPATIENT
Start: 2024-02-27 | End: 2024-03-12

## 2024-02-27 RX ORDER — METHOCARBAMOL 500 MG/1
750 TABLET, FILM COATED ORAL
Status: COMPLETED | OUTPATIENT
Start: 2024-02-27 | End: 2024-02-27

## 2024-02-27 RX ORDER — IBUPROFEN 400 MG/1
800 TABLET ORAL
Status: COMPLETED | OUTPATIENT
Start: 2024-02-27 | End: 2024-02-27

## 2024-02-27 RX ORDER — LIDOCAINE 4 G/G
2 PATCH TOPICAL
Status: DISCONTINUED | OUTPATIENT
Start: 2024-02-27 | End: 2024-02-27 | Stop reason: HOSPADM

## 2024-02-27 RX ORDER — LIDOCAINE 50 MG/G
2 PATCH TOPICAL DAILY
Qty: 20 PATCH | Refills: 0 | Status: SHIPPED | OUTPATIENT
Start: 2024-02-27 | End: 2024-03-08

## 2024-02-27 RX ADMIN — ACETAMINOPHEN 1000 MG: 500 TABLET ORAL at 08:34

## 2024-02-27 RX ADMIN — IBUPROFEN 800 MG: 400 TABLET, FILM COATED ORAL at 08:34

## 2024-02-27 RX ADMIN — METHOCARBAMOL 750 MG: 500 TABLET ORAL at 08:34

## 2024-02-27 ASSESSMENT — PAIN - FUNCTIONAL ASSESSMENT: PAIN_FUNCTIONAL_ASSESSMENT: 0-10

## 2024-02-27 ASSESSMENT — PAIN DESCRIPTION - ORIENTATION: ORIENTATION: MID;LOWER;RIGHT

## 2024-02-27 ASSESSMENT — PAIN SCALES - GENERAL: PAINLEVEL_OUTOF10: 10

## 2024-02-27 ASSESSMENT — LIFESTYLE VARIABLES
HOW MANY STANDARD DRINKS CONTAINING ALCOHOL DO YOU HAVE ON A TYPICAL DAY: PATIENT DOES NOT DRINK
HOW OFTEN DO YOU HAVE A DRINK CONTAINING ALCOHOL: NEVER

## 2024-02-27 ASSESSMENT — PAIN DESCRIPTION - LOCATION: LOCATION: BACK;SHOULDER

## 2024-02-27 ASSESSMENT — PAIN DESCRIPTION - DESCRIPTORS: DESCRIPTORS: PRESSURE

## 2024-02-27 NOTE — ED NOTES

## 2024-02-27 NOTE — ED PROVIDER NOTES
[facility] EMERGENCY DEPT  EMERGENCY DEPARTMENT ENCOUNTER       Pt Name: Monica Hemphill  MRN: [id number]  Birthdate 1990  Date of evaluation: 2/27/2024  Provider: Bi Coker MD   PCP: Omkar Bull MD  Note Started: 8:30 AM 2/27/24     CHIEF COMPLAINT       Chief Complaint   Patient presents with    Shoulder Pain    Back Pain        HISTORY OF PRESENT ILLNESS: 1 or more elements      History From: Patient  None     Monica Hemphill is a 33 y.o. female who presents to the emergency department with left-sided low back pain, right-sided posterior shoulder pain.  Patient states she woke up with symptoms on Thursday.  Patient denies any recent heavy lifting, straining, trauma.  Patient reports that she previously had some numbness in her legs but states this is resolved, now denied numbness or weakness.  Patient denies saddle anesthesia, incontinence, history of IVDU, fevers.  Patient took Tylenol and lidocaine cream with partial relief in symptoms.     Nursing Notes were all reviewed and agreed with or any disagreements were addressed in the HPI.     REVIEW OF SYSTEMS      Review of Systems     Positives and Pertinent negatives as per HPI.    PAST HISTORY     Past Medical History:  Past Medical History:   Diagnosis Date    Abnormal Pap smear     Complication of anesthesia     never had    Depression     Mild episode of recurrent major depressive disorder (HCC) 4/21/2020    Sleep apnea          Past Surgical History:  Past Surgical History:   Procedure Laterality Date    COLONOSCOPY  10/2023    GYN      c section    TUBAL LIGATION  12/14/2021       Family History:  Family History   Problem Relation Age of Onset    No Known Problems Mother     Diabetes Father         Patient states no known medical issues - please remove    Hypertension Father         Patient states no known medical issues - please remove    Cancer Maternal Grandfather     Diabetes Maternal Grandfather     Hypertension Maternal Grandfather

## 2024-02-27 NOTE — DISCHARGE INSTRUCTIONS
nurse.     Please make an appointment with your family doctor or the physician you were referred to for follow-up of this visit as instructed on your discharge paperwork. Return to the ER if you are unable to be seen or if you are unable to be seen in a timely manner.    Should you experience abdominal pain lasting greater than 6 hours, chest pain, difficulty breathing, fever/chills, numbness/tingling, skin changes or other symptoms that concern you, return to the ED sooner. If you feel worse over the next 24 hours, please return to the ED. We are available 24 hours a day. Thank you for trusting us with your care!

## 2024-02-27 NOTE — ED TRIAGE NOTES
Pt presents ambulatory to ED complaining of R shoulder pain and mid to lower back pain since Thursday. Pt denies any recent injuries/ trauma, falls, repetitive movement or heavy lifting. Pt reports taking OTC meds with no relief. Pt reports moving the wrong way yesterday and pain intensify. Pt is alert and oriented x 4, RR even and unlabored, skin is warm and dry. Assesment completed and pt updated on plan of care.       Emergency Department Nursing Plan of Care       The Nursing Plan of Care is developed from the Nursing assessment and Emergency Department Attending provider initial evaluation.  The plan of care may be reviewed in the “ED Provider note”.    The Plan of Care was developed with the following considerations:   Patient / Family readiness to learn indicated by:verbalized understanding  Persons(s) to be included in education: patient  Barriers to Learning/Limitations:None    Signed     Corinna Barrett RN    2/27/2024   7:56 AM

## 2024-05-01 ENCOUNTER — TELEPHONE (OUTPATIENT)
Age: 34
End: 2024-05-01

## 2024-07-15 ENCOUNTER — HOSPITAL ENCOUNTER (EMERGENCY)
Facility: HOSPITAL | Age: 34
Discharge: HOME | End: 2024-07-15
Payer: MEDICAID

## 2024-07-15 VITALS
SYSTOLIC BLOOD PRESSURE: 131 MMHG | HEART RATE: 94 BPM | RESPIRATION RATE: 18 BRPM | OXYGEN SATURATION: 98 % | TEMPERATURE: 98.5 F | DIASTOLIC BLOOD PRESSURE: 83 MMHG | HEIGHT: 64 IN | WEIGHT: 244.5 LBS | BODY MASS INDEX: 41.74 KG/M2

## 2024-07-15 DIAGNOSIS — U07.1 COVID: Primary | ICD-10-CM

## 2024-07-15 LAB
DEPRECATED S PYO AG THROAT QL EIA: NEGATIVE
FLUAV RNA SPEC QL NAA+PROBE: NOT DETECTED
FLUBV RNA SPEC QL NAA+PROBE: NOT DETECTED
SARS-COV-2 RNA RESP QL NAA+PROBE: DETECTED

## 2024-07-15 PROCEDURE — 99283 EMERGENCY DEPT VISIT LOW MDM: CPT

## 2024-07-15 PROCEDURE — 87070 CULTURE OTHR SPECIMN AEROBIC: CPT

## 2024-07-15 PROCEDURE — 87636 SARSCOV2 & INF A&B AMP PRB: CPT

## 2024-07-15 PROCEDURE — 87880 STREP A ASSAY W/OPTIC: CPT

## 2024-07-15 RX ORDER — IBUPROFEN 800 MG/1
800 TABLET ORAL EVERY 8 HOURS PRN
Qty: 20 TABLET | Refills: 0 | Status: SHIPPED | OUTPATIENT
Start: 2024-07-15

## 2024-07-15 RX ORDER — BENZONATATE 200 MG/1
200 CAPSULE ORAL 3 TIMES DAILY PRN
Qty: 15 CAPSULE | Refills: 0 | Status: SHIPPED | OUTPATIENT
Start: 2024-07-15 | End: 2024-07-22

## 2024-07-15 RX ORDER — ALBUTEROL SULFATE 90 UG/1
2 AEROSOL, METERED RESPIRATORY (INHALATION) EVERY 6 HOURS PRN
Qty: 18 G | Refills: 0 | Status: SHIPPED | OUTPATIENT
Start: 2024-07-15

## 2024-07-15 ASSESSMENT — PAIN - FUNCTIONAL ASSESSMENT: PAIN_FUNCTIONAL_ASSESSMENT: 0-10

## 2024-07-15 ASSESSMENT — PAIN SCALES - GENERAL: PAINLEVEL_OUTOF10: 8

## 2024-07-15 ASSESSMENT — PAIN DESCRIPTION - LOCATION: LOCATION: GENERALIZED

## 2024-07-15 ASSESSMENT — PAIN DESCRIPTION - DESCRIPTORS: DESCRIPTORS: ACHING

## 2024-07-15 ASSESSMENT — LIFESTYLE VARIABLES
HOW MANY STANDARD DRINKS CONTAINING ALCOHOL DO YOU HAVE ON A TYPICAL DAY: 1 OR 2
HOW OFTEN DO YOU HAVE A DRINK CONTAINING ALCOHOL: MONTHLY OR LESS

## 2024-07-15 NOTE — ED PROVIDER NOTES
Omkar BERRY MD  [address] [id number] [phone]    In 1 week  As needed    [facility] EMERGENCY DEPT  [address] [facility] [address]  [phone]    If symptoms worsen       DISCHARGE MEDICATIONS:     Medication List        START taking these medications      benzonatate 200 MG capsule  Commonly known as: TESSALON  Take 1 capsule by mouth 3 times daily as needed for Cough            CHANGE how you take these medications      ibuprofen 800 MG tablet  Commonly known as: ADVIL;MOTRIN  Take 1 tablet by mouth every 8 hours as needed for Pain  What changed: when to take this            CONTINUE taking these medications      albuterol sulfate  (90 Base) MCG/ACT inhaler  Commonly known as: PROVENTIL;VENTOLIN;PROAIR  Inhale 2 puffs into the lungs every 6 hours as needed for Wheezing            ASK your doctor about these medications      busPIRone 10 MG tablet  Commonly known as: BUSPAR     fluticasone 50 MCG/ACT nasal spray  Commonly known as: FLONASE     loratadine 10 MG tablet  Commonly known as: CLARITIN     lurasidone 40 MG Tabs tablet  Commonly known as: LATUDA     pantoprazole 40 MG tablet  Commonly known as: PROTONIX  Take 1 tablet by mouth every morning (before breakfast)     pseudoephedrine 120 MG extended release tablet  Commonly known as: SUDAFED 12 HR               Where to Get Your Medications        These medications were sent to [facility] [address] [place] [address] [address] [facility] [phone] - F [phone] [address] [facility] [address]      Phone: [phone]   albuterol sulfate  (90 Base) MCG/ACT inhaler  benzonatate 200 MG capsule  ibuprofen 800 MG tablet           DISCONTINUED MEDICATIONS:  Discharge Medication List as of 7/15/2024 11:03 AM        STOP taking these medications       dicyclomine (BENTYL) 20 MG tablet Comments:   Reason for Stopping:               I have seen and evaluated the patient autonomously. My supervision physician was on

## 2024-07-15 NOTE — ED NOTES
Pt presents ambulatory to ED complaining of sore throat, productive cough that induces nausea, body aches, and diarrhea x 2 days. Pt reports several family members have been sick recently. Pt denies SOB, CP, vomiting, abdominal pain or fevers.    Patient is resting on stretcher. Bed in lowest locked position. Call bell at bedside. Patient is A&Ox4, follows commands. Patient RR is regular rate and depth. Pt HR and BP elevated on arrival. Pt denies hx of HTN. Throat and tonsils appear red. Skin is warm, dry, intact. Patient is well appearing and does not appear to be in acute distress at this time. Patient and family updated on care plan.      Emergency Department Nursing Plan of Care The Nursing Plan of Care is developed from the Nursing assessment and Emergency Department Attending provider initial evaluation. The plan of care may be reviewed in the “ED Provider note”.     The Plan of Care was developed with the following considerations:  Patient / Family readiness to learn indicated by:verbalized understanding, successful return demonstration, and appropriate questions asked  Persons(s) to be included in education: patient  Barriers to Learning/Limitations:None    Signed    Josemanuel Gentile RN   7/15/2024   10:25 AM

## 2024-07-17 LAB
BACTERIA SPEC CULT: NORMAL
SERVICE CMNT-IMP: NORMAL

## 2024-07-20 ENCOUNTER — OFFICE VISIT (OUTPATIENT)
Age: 34
End: 2024-07-20

## 2024-07-20 VITALS
HEIGHT: 65 IN | WEIGHT: 243 LBS | TEMPERATURE: 98.7 F | HEART RATE: 86 BPM | BODY MASS INDEX: 40.48 KG/M2 | SYSTOLIC BLOOD PRESSURE: 122 MMHG | RESPIRATION RATE: 18 BRPM | OXYGEN SATURATION: 98 % | DIASTOLIC BLOOD PRESSURE: 82 MMHG

## 2024-07-20 DIAGNOSIS — L03.011 CELLULITIS OF RIGHT MIDDLE FINGER: Primary | ICD-10-CM

## 2024-07-20 RX ORDER — DOXYCYCLINE HYCLATE 100 MG
100 TABLET ORAL 2 TIMES DAILY
Qty: 14 TABLET | Refills: 0 | Status: SHIPPED | OUTPATIENT
Start: 2024-07-20 | End: 2024-07-27

## 2024-07-20 ASSESSMENT — ENCOUNTER SYMPTOMS: COLOR CHANGE: 1

## 2024-07-20 NOTE — PATIENT INSTRUCTIONS
Thank you for visiting [facility] today.    Take oral antibiotics on a full stomach until you complete the entire prescription.   Take a probiotic while you are using the antibiotic.    Elevate the area - elevating the arm or leg above the level of the heart can help to reduce swelling and speed healing.  Keep the area clean and dry - it is important to keep the infected area clean and dry.   You can shower or bathe normally and pat the area dry with a clean towel.  You can use a bandage or gauze to protect the skin if needed.  You may use warm, moist compresses for pain relief.  Antibiotics - Most people with cellulitis are treated with an antibiotic that is taken by mouth for 5 to 14 days.   It is important to take the antibiotic exactly as recommended and to finish the entire course of treatment.  Skipping doses or ending treatment early could potentially allow the bacteria to become resistant and require longer treatment or permit the infection to worse after initial improvement.  Time to heal - Resolution of fever and chills, if they were initially present, should occur within one to two days after starting antibiotic therapy.  Local findings of swelling, warmth, and redness should begin to improve within one to three days after starting antibiotics, although these symptoms can persist for two weeks.  If the reddened area becomes larger, more swollen, or more tender, call your healthcare provider.  He or she may want to re-examine you to determine if further testing or an alternate antibiotic is needed.      Please follow up with your PCP within 2 days if your signs and symptoms have not resolved or worsened.    Please go immediately to the ED if you develop fever of 100.4F or above, chills, vomiting, worsening redness/pain/swelling to affected area, red streaks, and/or no improvement after 48 hours of oral antibiotic therapy.

## 2024-07-20 NOTE — PROGRESS NOTES
Subjective     Chief Complaint   Patient presents with    Allergic Reaction     Right hand middle finger swollen for x7days and has decreased in pain and size- possible allergic reaction (started with a callous)        Patient is 34 year old female presenting with right middle finger pain and swelling.  Symptoms began one week ago and she is concerned it may be an allergic reaction.  She noticed a callous at first and then began soaking the finger in epsom salts with some improvement.   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          Past Medical History:   Diagnosis Date    Abnormal Pap smear     Complication of anesthesia     never had    Depression     Mild episode of recurrent major depressive disorder (HCC) 4/21/2020    Sleep apnea        Past Surgical History:   Procedure Laterality Date    COLONOSCOPY  10/2023    GYN      c section    TUBAL LIGATION  12/14/2021       Family History   Problem Relation Age of Onset    No Known Problems Mother     Diabetes Father         Patient states no known medical issues - please remove    Hypertension Father         Patient states no known medical issues - please remove    Cancer Maternal Grandfather     Diabetes Maternal Grandfather     Hypertension Maternal Grandfather     Diabetes Paternal Grandmother        No Known Allergies    Social History     Tobacco Use    Smoking status: Every Day     Types: Cigars    Smokeless tobacco: Never   Vaping Use    Vaping Use: Never used   Substance Use Topics    Alcohol use: Not Currently    Drug use: Not Currently     Types: Marijuana (Weed)       Vitals:    07/20/24 0858   BP: 122/82   Pulse:    Resp:    Temp:    SpO2:        Review of Systems   Skin:  Positive for color change.       Objective     Physical Exam  Vitals and nursing note reviewed.   Constitutional:       General: She is not in acute distress.     Appearance: Normal appearance. She is not ill-appearing.   HENT:      Head: Normocephalic and atraumatic.
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
## 2024-09-27 NOTE — ED NOTES
Discharge instructions were given to the patient by Eugenio Lopez RN. The patient left the Emergency Department ambulatory, alert and oriented and in no acute distress with 3 prescriptions. The patient was encouraged to call or return to the ED for worsening issues or problems and was encouraged to schedule a follow up appointment for continuing care. The patient verbalized understanding of discharge instructions and prescriptions, all questions were answered.  The patient has no further concerns at this time.   Lakshmi Browning  12/06/23 6220 Subjective    Mr. Holm is 73 y.o. male    Chief Complaint: BPH     History of Present Illness  Patient referred for BPH.  Most recent PSA 2.1.  Patient currently on Flomax (one year duration) and Finasteride (6 month duration). AUA 27/35 with incomplete emptying, frequency, intermittency, urgency, weak stream, straining, nocutria X 1.  Denies UTI, gross hematuria, urinary retention. Cystoscopy showed latera lobe hypertrophy of prostate and Uroflow with avg flow 5.2.  The following portions of the patient's history were reviewed and updated as appropriate: allergies, current medications, past family history, past medical history, past social history, past surgical history and problem list.    Review of Systems   Constitutional:  Negative for chills and fever.   Gastrointestinal:  Negative for abdominal pain, anal bleeding and blood in stool.   Genitourinary:  Positive for difficulty urinating. Negative for dysuria and hematuria.         Current Outpatient Medications:     albuterol sulfate  (90 Base) MCG/ACT inhaler, Inhale 2 puffs Every 4 (Four) Hours As Needed for Wheezing., Disp: , Rfl:     Belbuca 600 MCG film, , Disp: , Rfl:     celecoxib (CeleBREX) 100 MG capsule, , Disp: , Rfl:     esomeprazole (nexIUM) 20 MG capsule, Take 1 capsule by mouth Every Morning Before Breakfast., Disp: , Rfl:     finasteride (PROSCAR) 5 MG tablet, , Disp: , Rfl:     PreviDent 5000 Booster Plus 1.1 % paste, , Disp: , Rfl:     tamsulosin (FLOMAX) 0.4 MG capsule 24 hr capsule, , Disp: , Rfl:     Trelegy Ellipta 100-62.5-25 MCG/INH aerosol powder , Inhale 1 puff Daily., Disp: , Rfl:     Past Medical History:   Diagnosis Date    Arrhythmia     Arthritis     BPH with obstruction/lower urinary tract symptoms 9/27/2024    Cancer     colon    COPD (chronic obstructive pulmonary disease)     Erectile dysfunction 2010    Hyperlipidemia     Hypertension     Numbness of foot     Bilateral     Urinary incontinence 2024       Past Surgical  History:   Procedure Laterality Date    BACK SURGERY      CARDIAC CATHETERIZATION N/A 12/10/2020    Procedure: Coronary angiography;  Surgeon: Lonny Wesley MD;  Location:  [facility] CATH INVASIVE LOCATION;  Service: Cardiology;  Laterality: N/A;    COLON RESECTION      REPLACEMENT TOTAL KNEE Right        Social History     Socioeconomic History    Marital status:    Tobacco Use    Smoking status: Former     Current packs/day: 0.00     Types: Cigarettes     Quit date:      Years since quittin.7    Smokeless tobacco: Former     Quit date:    Vaping Use    Vaping status: Never Used   Substance and Sexual Activity    Alcohol use: Not Currently     Comment: quit     Drug use: Never    Sexual activity: Not Currently     Partners: Female       Family History   Problem Relation Age of Onset    Lung disease Mother     Heart disease Father     No Known Problems Sister     No Known Problems Brother     No Known Problems Sister     No Known Problems Brother        Objective    There were no vitals taken for this visit.    Physical Exam  Vitals reviewed.   Constitutional:       General: He is not in acute distress.     Appearance: He is well-developed. He is not diaphoretic.   Pulmonary:      Effort: Pulmonary effort is normal.   Abdominal:      General: There is no distension.      Palpations: Abdomen is soft. There is no mass.      Tenderness: There is no abdominal tenderness. There is no guarding or rebound.      Hernia: No hernia is present.   Skin:     General: Skin is warm and dry.   Neurological:      Mental Status: He is alert and oriented to person, place, and time.             Results for orders placed or performed in visit on 24   POC Urinalysis Dipstick, Multipro    Specimen: Urine   Result Value Ref Range    Color Yellow Yellow, Straw, Dark Yellow, Zoie    Clarity, UA Clear Clear    Glucose, UA Negative Negative mg/dL    Bilirubin Negative Negative    Ketones, UA Negative Negative     Specific Gravity  1.025 1.005 - 1.030    Blood, UA Trace (A) Negative    pH, Urine 5.5 5.0 - 8.0    Protein, POC Negative Negative mg/dL    Urobilinogen, UA 0.2 E.U./dL Normal, 0.2 E.U./dL    Nitrite, UA Negative Negative    Leukocytes Negative Negative     Assessment and Plan    Diagnoses and all orders for this visit:    1. BPH with obstruction/lower urinary tract symptoms (Primary)  -     Case Request; Standing  -     sodium chloride 0.9 % flush 10 mL  -     sodium chloride 0.9 % flush 1-10 mL  -     sodium chloride 0.9 % infusion 40 mL  -     sodium chloride 0.9 % infusion  -     ceFAZolin (ANCEF) 2,000 mg in sodium chloride 0.9 % 100 mL IVPB  -     Case Request    Other orders  -     Follow Anesthesia Guidelines / Protocol; Future  -     Follow Anesthesia Guidelines / Protocol; Standing  -     Verify / Perform Chlorhexidine Skin Prep; Standing  -     Obtain Informed Consent; Future  -     Provide NPO Instructions to Patient; Future  -     Chlorhexidine Skin Prep; Future  -     Insert Peripheral IV; Standing  -     Saline Lock & Maintain IV Access; Standing  -     Place Sequential Compression Device; Standing  -     Maintain Sequential Compression Device; Standing                Indications:  Benign prostate hypertrophy    Pre-operative prep:  fleets enema    Last PSA:  2.1    Procedure:    After proper identification of patient and procedure, patient was placed in the left later decubitus position.  2% lidocaine jelly was instilled per rectum  for topical anesthesia.  The ultrasound probe was gently inserted per rectum. Prostate was scanned from the base of the bladder to the apex.  20 cc of 1% lidocaine plain was then used to perform a prostate nerve block injecting the junction of the seminal vesicle and bladder laterally.      Prostate length: 5.42 cm    Prostate width: 4.87 cm    Prostate height: 2.87 cm    Prostate volume: 39.66 cc    PSA density: 0.05    Abnormal findings:  Transition zone  enlargement    Median lobe:  no      Patient with obstructing lateral lobes on cystoscopy total volume 40 cc.  He is failed dual therapy.  Will plan for aqua ablation.  Discussion of this resulted in significant evaluation management in addition to the procedure done today.  I discussed risk benefits and alternatives of treatment.  Patient gave informed consent understood risks are limited including but not limited to UTI hematuria requiring transfusion or procedure urethral stricture recurrent voiding symptoms.

## 2024-10-14 ENCOUNTER — APPOINTMENT (OUTPATIENT)
Facility: HOSPITAL | Age: 34
End: 2024-10-14
Payer: MEDICAID

## 2024-10-14 ENCOUNTER — HOSPITAL ENCOUNTER (EMERGENCY)
Facility: HOSPITAL | Age: 34
Discharge: HOME | End: 2024-10-15
Attending: EMERGENCY MEDICINE
Payer: MEDICAID

## 2024-10-14 VITALS
DIASTOLIC BLOOD PRESSURE: 81 MMHG | RESPIRATION RATE: 18 BRPM | HEART RATE: 94 BPM | SYSTOLIC BLOOD PRESSURE: 146 MMHG | TEMPERATURE: 98 F | WEIGHT: 230 LBS | BODY MASS INDEX: 38.32 KG/M2 | HEIGHT: 65 IN | OXYGEN SATURATION: 98 %

## 2024-10-14 DIAGNOSIS — S99.911A ANKLE INJURY, RIGHT, INITIAL ENCOUNTER: Primary | ICD-10-CM

## 2024-10-14 DIAGNOSIS — S93.401A SPRAIN OF RIGHT ANKLE, UNSPECIFIED LIGAMENT, INITIAL ENCOUNTER: ICD-10-CM
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
## 2024-10-14 PROCEDURE — 99284 EMERGENCY DEPT VISIT MOD MDM: CPT

## 2024-10-14 PROCEDURE — 96372 THER/PROPH/DIAG INJ SC/IM: CPT

## 2024-10-14 PROCEDURE — 73610 X-RAY EXAM OF ANKLE: CPT

## 2024-10-14 PROCEDURE — 6370000000 HC RX 637 (ALT 250 FOR IP): Performed by: EMERGENCY MEDICINE

## 2024-10-14 PROCEDURE — 6360000002 HC RX W HCPCS: Performed by: EMERGENCY MEDICINE

## 2024-10-14 RX ORDER — TRAMADOL HYDROCHLORIDE 50 MG/1
50 TABLET ORAL
Status: COMPLETED | OUTPATIENT
Start: 2024-10-14 | End: 2024-10-14

## 2024-10-14 RX ORDER — TRAMADOL HYDROCHLORIDE 50 MG/1
50 TABLET ORAL EVERY 6 HOURS PRN
Qty: 12 TABLET | Refills: 0 | Status: SHIPPED | OUTPATIENT
Start: 2024-10-14 | End: 2024-10-17

## 2024-10-14 RX ORDER — KETOROLAC TROMETHAMINE 30 MG/ML
30 INJECTION, SOLUTION INTRAMUSCULAR; INTRAVENOUS ONCE
Status: COMPLETED | OUTPATIENT
Start: 2024-10-14 | End: 2024-10-14

## 2024-10-14 RX ORDER — NAPROXEN 500 MG/1
500 TABLET ORAL 2 TIMES DAILY
Qty: 60 TABLET | Refills: 0 | Status: SHIPPED | OUTPATIENT
Start: 2024-10-14

## 2024-10-14 RX ADMIN — TRAMADOL HYDROCHLORIDE 50 MG: 50 TABLET ORAL at 23:10

## 2024-10-14 RX ADMIN — KETOROLAC TROMETHAMINE 30 MG: 30 INJECTION, SOLUTION INTRAMUSCULAR at 23:10

## 2024-10-14 ASSESSMENT — PAIN DESCRIPTION - LOCATION: LOCATION: ANKLE

## 2024-10-14 ASSESSMENT — PAIN - FUNCTIONAL ASSESSMENT: PAIN_FUNCTIONAL_ASSESSMENT: 0-10

## 2024-10-14 ASSESSMENT — PAIN DESCRIPTION - ORIENTATION: ORIENTATION: RIGHT

## 2024-10-14 ASSESSMENT — PAIN SCALES - GENERAL: PAINLEVEL_OUTOF10: 9

## 2024-10-15 NOTE — ED PROVIDER NOTES
EMERGENCY DEPARTMENT HISTORY AND PHYSICAL EXAM            Please note that this dictation was completed with the assistance of \"Dragon\", the computer voice recognition software. Quite often unanticipated grammatical, syntax, homophones, and other interpretive errors are inadvertently transcribed by the computer software. Please disregard these errors and any errors that have escaped final proofreading. Thank you.    Date of Evaluation: 10/15/24  Patient: Monica Hemphill  Patient Age and Sex: 34 y.o. female   MRN: [id number]  CSN: [id number]  PCP: Omkar Bull MD    History of Present Illness     Chief Complaint   Patient presents with    Ankle Pain     right     History Provided By: Patient/family/EMS (if available)    History is limited by: Nothing     HPI: Monica Hemphill, 34 y.o. female with past medical history as documented below presents to the ED with c/o of severe right lateral ankle pain after playing basketball and rolling over her ankle after landing. This occurred PTA. No medications PTA. No unilateral numbness or weakness. No head injury.. Pt denies any other exacerbating or ameliorating factors. There are no other complaints, changes or physical findings pertinent to the HPI at this time.    Nursing notes were all reviewed and agreed with or any disagreements were addressed in the HPI.    Past History   Past Medical History:  Past Medical History:   Diagnosis Date    Abnormal Pap smear     Complication of anesthesia     never had    Depression     Mild episode of recurrent major depressive disorder (HCC) 4/21/2020    Sleep apnea        Past Surgical History:  Past Surgical History:   Procedure Laterality Date    COLONOSCOPY  10/2023    GYN      c section    TUBAL LIGATION  12/14/2021       Family History:   Family history reviewed and was non-contributory, unless specified below:  Family History   Problem Relation Age of Onset    No Known Problems Mother     Diabetes Father         Patient states

## 2024-10-15 NOTE — ED NOTES
Pt presents ambulatory to ED complaining of Ankle pain. Pt is alert and oriented x 4, RR even and unlabored, skin is warm and dry. Assesment completed and pt updated on plan of care.       Emergency Department Nursing Plan of Care       The Nursing Plan of Care is developed from the Nursing assessment and Emergency Department Attending provider initial evaluation.  The plan of care may be reviewed in the “ED Provider note”.    The Plan of Care was developed with the following considerations:   Patient / Family readiness to learn indicated by:verbalized understanding  Persons(s) to be included in education: patient  Barriers to Learning/Limitations:None    Signed     Urban Conway RN    10/14/2024   10:54 PM

## 2024-10-15 NOTE — ED TRIAGE NOTES
Pt presents to ED with right ankle pain. Pt reports she jumped today while playing basketball and rolled her right ankle when she landed.

## 2024-10-15 NOTE — ED NOTES
Discharge instructions were given to the patient by RN.     The patient left the Emergency Department alert and oriented and in no acute distress with 2 prescriptions. The patient was encouraged to call or return to the ED for worsening issues or problems and was encouraged to schedule a follow up appointment for continuing care.     Ambulation assessment completed before discharge.  Pt left Emergency Department ambulating at baseline with walking boot  Ortho device education: proper application, s/sx of poor perfusion, proper use, and RICE    The patient verbalized understanding of discharge instructions and prescriptions, all questions were answered. The patient has no further concerns at this time.

## 2024-10-16 ASSESSMENT — ENCOUNTER SYMPTOMS
SHORTNESS OF BREATH: 0
COUGH: 0
ABDOMINAL PAIN: 0
DIARRHEA: 0
RHINORRHEA: 0
NAUSEA: 0
VOMITING: 0
EYE PAIN: 0
SORE THROAT: 0

## 2024-10-23 ENCOUNTER — OFFICE VISIT (OUTPATIENT)
Facility: CLINIC | Age: 34
End: 2024-10-23
Payer: MEDICAID

## 2024-10-23 VITALS
HEART RATE: 91 BPM | WEIGHT: 245 LBS | SYSTOLIC BLOOD PRESSURE: 116 MMHG | BODY MASS INDEX: 40.82 KG/M2 | TEMPERATURE: 98.2 F | RESPIRATION RATE: 16 BRPM | HEIGHT: 65 IN | DIASTOLIC BLOOD PRESSURE: 74 MMHG | OXYGEN SATURATION: 100 %

## 2024-10-23 DIAGNOSIS — K76.0 STEATOSIS OF LIVER: ICD-10-CM

## 2024-10-23 DIAGNOSIS — E66.01 MORBID OBESITY: ICD-10-CM

## 2024-10-23 DIAGNOSIS — K80.20 CALCULUS OF GALLBLADDER WITHOUT CHOLECYSTITIS WITHOUT OBSTRUCTION: ICD-10-CM

## 2024-10-23 DIAGNOSIS — S93.401A SPRAIN AND STRAIN OF RIGHT ANKLE: Primary | ICD-10-CM

## 2024-10-23 DIAGNOSIS — S96.911A SPRAIN AND STRAIN OF RIGHT ANKLE: Primary | ICD-10-CM

## 2024-10-23 PROCEDURE — 99214 OFFICE O/P EST MOD 30 MIN: CPT | Performed by: INTERNAL MEDICINE

## 2024-10-23 SDOH — ECONOMIC STABILITY: FOOD INSECURITY: WITHIN THE PAST 12 MONTHS, THE FOOD YOU BOUGHT JUST DIDN'T LAST AND YOU DIDN'T HAVE MONEY TO GET MORE.: NEVER TRUE

## 2024-10-23 SDOH — ECONOMIC STABILITY: FOOD INSECURITY: WITHIN THE PAST 12 MONTHS, YOU WORRIED THAT YOUR FOOD WOULD RUN OUT BEFORE YOU GOT MONEY TO BUY MORE.: NEVER TRUE

## 2024-10-23 SDOH — ECONOMIC STABILITY: INCOME INSECURITY: HOW HARD IS IT FOR YOU TO PAY FOR THE VERY BASICS LIKE FOOD, HOUSING, MEDICAL CARE, AND HEATING?: NOT HARD AT ALL

## 2024-10-23 ASSESSMENT — PATIENT HEALTH QUESTIONNAIRE - PHQ9
10. IF YOU CHECKED OFF ANY PROBLEMS, HOW DIFFICULT HAVE THESE PROBLEMS MADE IT FOR YOU TO DO YOUR WORK, TAKE CARE OF THINGS AT HOME, OR GET ALONG WITH OTHER PEOPLE: NOT DIFFICULT AT ALL
9. THOUGHTS THAT YOU WOULD BE BETTER OFF DEAD, OR OF HURTING YOURSELF: NOT AT ALL
SUM OF ALL RESPONSES TO PHQ QUESTIONS 1-9: 0
5. POOR APPETITE OR OVEREATING: NOT AT ALL
2. FEELING DOWN, DEPRESSED OR HOPELESS: NOT AT ALL
SUM OF ALL RESPONSES TO PHQ QUESTIONS 1-9: 0
4. FEELING TIRED OR HAVING LITTLE ENERGY: NOT AT ALL
SUM OF ALL RESPONSES TO PHQ QUESTIONS 1-9: 0
6. FEELING BAD ABOUT YOURSELF - OR THAT YOU ARE A FAILURE OR HAVE LET YOURSELF OR YOUR FAMILY DOWN: NOT AT ALL
8. MOVING OR SPEAKING SO SLOWLY THAT OTHER PEOPLE COULD HAVE NOTICED. OR THE OPPOSITE, BEING SO FIGETY OR RESTLESS THAT YOU HAVE BEEN MOVING AROUND A LOT MORE THAN USUAL: NOT AT ALL
1. LITTLE INTEREST OR PLEASURE IN DOING THINGS: NOT AT ALL
SUM OF ALL RESPONSES TO PHQ9 QUESTIONS 1 & 2: 0
7. TROUBLE CONCENTRATING ON THINGS, SUCH AS READING THE NEWSPAPER OR WATCHING TELEVISION: NOT AT ALL
SUM OF ALL RESPONSES TO PHQ QUESTIONS 1-9: 0
3. TROUBLE FALLING OR STAYING ASLEEP: NOT AT ALL

## 2024-10-23 ASSESSMENT — ANXIETY QUESTIONNAIRES
7. FEELING AFRAID AS IF SOMETHING AWFUL MIGHT HAPPEN: NOT AT ALL
2. NOT BEING ABLE TO STOP OR CONTROL WORRYING: NOT AT ALL
GAD7 TOTAL SCORE: 0
5. BEING SO RESTLESS THAT IT IS HARD TO SIT STILL: NOT AT ALL
4. TROUBLE RELAXING: NOT AT ALL
IF YOU CHECKED OFF ANY PROBLEMS ON THIS QUESTIONNAIRE, HOW DIFFICULT HAVE THESE PROBLEMS MADE IT FOR YOU TO DO YOUR WORK, TAKE CARE OF THINGS AT HOME, OR GET ALONG WITH OTHER PEOPLE: NOT DIFFICULT AT ALL
1. FEELING NERVOUS, ANXIOUS, OR ON EDGE: NOT AT ALL
6. BECOMING EASILY ANNOYED OR IRRITABLE: NOT AT ALL
3. WORRYING TOO MUCH ABOUT DIFFERENT THINGS: NOT AT ALL

## 2024-10-23 NOTE — PROGRESS NOTES
Chief Complaint   Patient presents with    Follow-up     Patient states \" she is here for a follow-up.\"        \"Have you been to the ER, urgent care clinic since your last visit?  Hospitalized since your last visit?\"    YES - When: approximately 1  weeks ago.  Where and Why: [facility]  right ankle .    “Have you seen or consulted any other health care providers outside our system since your last visit?”    NO     “Have you had a pap smear?”    NO    No cervical cancer screening on file

## 2024-10-28 ENCOUNTER — APPOINTMENT (OUTPATIENT)
Facility: HOSPITAL | Age: 34
End: 2024-10-28
Payer: MEDICAID

## 2024-10-28 ENCOUNTER — HOSPITAL ENCOUNTER (EMERGENCY)
Facility: HOSPITAL | Age: 34
Discharge: HOME | End: 2024-10-28
Attending: EMERGENCY MEDICINE
Payer: MEDICAID

## 2024-10-28 VITALS
DIASTOLIC BLOOD PRESSURE: 87 MMHG | SYSTOLIC BLOOD PRESSURE: 140 MMHG | HEART RATE: 86 BPM | RESPIRATION RATE: 18 BRPM | OXYGEN SATURATION: 96 % | TEMPERATURE: 98.6 F

## 2024-10-28 DIAGNOSIS — R51.9 ACUTE NONINTRACTABLE HEADACHE, UNSPECIFIED HEADACHE TYPE: Primary | ICD-10-CM

## 2024-10-28 LAB
FLUAV RNA SPEC QL NAA+PROBE: NOT DETECTED
FLUBV RNA SPEC QL NAA+PROBE: NOT DETECTED
SARS-COV-2 RNA RESP QL NAA+PROBE: NOT DETECTED
SOURCE: NORMAL

## 2024-10-28 PROCEDURE — 6360000002 HC RX W HCPCS: Performed by: EMERGENCY MEDICINE

## 2024-10-28 PROCEDURE — 96374 THER/PROPH/DIAG INJ IV PUSH: CPT

## 2024-10-28 PROCEDURE — 87636 SARSCOV2 & INF A&B AMP PRB: CPT

## 2024-10-28 PROCEDURE — 99284 EMERGENCY DEPT VISIT MOD MDM: CPT

## 2024-10-28 PROCEDURE — 96375 TX/PRO/DX INJ NEW DRUG ADDON: CPT

## 2024-10-28 PROCEDURE — 70450 CT HEAD/BRAIN W/O DYE: CPT

## 2024-10-28 PROCEDURE — 6370000000 HC RX 637 (ALT 250 FOR IP): Performed by: EMERGENCY MEDICINE

## 2024-10-28 RX ORDER — DIPHENHYDRAMINE HYDROCHLORIDE 50 MG/ML
25 INJECTION INTRAMUSCULAR; INTRAVENOUS
Status: COMPLETED | OUTPATIENT
Start: 2024-10-28 | End: 2024-10-28

## 2024-10-28 RX ORDER — KETOROLAC TROMETHAMINE 30 MG/ML
30 INJECTION, SOLUTION INTRAMUSCULAR; INTRAVENOUS
Status: COMPLETED | OUTPATIENT
Start: 2024-10-28 | End: 2024-10-28

## 2024-10-28 RX ORDER — BUTALBITAL, ACETAMINOPHEN AND CAFFEINE 50; 325; 40 MG/1; MG/1; MG/1
1 TABLET ORAL EVERY 6 HOURS PRN
Qty: 20 TABLET | Refills: 0 | Status: SHIPPED | OUTPATIENT
Start: 2024-10-28

## 2024-10-28 RX ORDER — BUTALBITAL, ACETAMINOPHEN AND CAFFEINE 50; 325; 40 MG/1; MG/1; MG/1
1 TABLET ORAL
Status: COMPLETED | OUTPATIENT
Start: 2024-10-28 | End: 2024-10-28

## 2024-10-28 RX ORDER — METOCLOPRAMIDE HYDROCHLORIDE 5 MG/ML
10 INJECTION INTRAMUSCULAR; INTRAVENOUS ONCE
Status: COMPLETED | OUTPATIENT
Start: 2024-10-28 | End: 2024-10-28

## 2024-10-28 RX ORDER — ONDANSETRON 4 MG/1
4 TABLET, ORALLY DISINTEGRATING ORAL 3 TIMES DAILY PRN
Qty: 21 TABLET | Refills: 0 | Status: SHIPPED | OUTPATIENT
Start: 2024-10-28

## 2024-10-28 RX ADMIN — DIPHENHYDRAMINE HYDROCHLORIDE 25 MG: 50 INJECTION INTRAMUSCULAR; INTRAVENOUS at 17:12

## 2024-10-28 RX ADMIN — METOCLOPRAMIDE 10 MG: 5 INJECTION, SOLUTION INTRAMUSCULAR; INTRAVENOUS at 17:11

## 2024-10-28 RX ADMIN — BUTALBITAL, ACETAMINOPHEN, AND CAFFEINE 1 TABLET: 50; 325; 40 TABLET ORAL at 19:47

## 2024-10-28 RX ADMIN — KETOROLAC TROMETHAMINE 30 MG: 30 INJECTION, SOLUTION INTRAMUSCULAR at 17:09

## 2024-10-28 ASSESSMENT — PAIN DESCRIPTION - DESCRIPTORS
DESCRIPTORS: ACHING
DESCRIPTORS: ACHING

## 2024-10-28 ASSESSMENT — PAIN SCALES - GENERAL
PAINLEVEL_OUTOF10: 10
PAINLEVEL_OUTOF10: 9
PAINLEVEL_OUTOF10: 8

## 2024-10-28 ASSESSMENT — LIFESTYLE VARIABLES
HOW OFTEN DO YOU HAVE A DRINK CONTAINING ALCOHOL: MONTHLY OR LESS
HOW MANY STANDARD DRINKS CONTAINING ALCOHOL DO YOU HAVE ON A TYPICAL DAY: 1 OR 2

## 2024-10-28 ASSESSMENT — PAIN DESCRIPTION - ORIENTATION
ORIENTATION: LEFT
ORIENTATION: RIGHT

## 2024-10-28 ASSESSMENT — PAIN DESCRIPTION - LOCATION
LOCATION: HEAD

## 2024-10-28 NOTE — ED NOTES
Went to discharge patient, family at bedside wanting to speak with provider. Patient reports headache has only improved to a 9/10 from 10/10, requesting more medication.

## 2024-10-28 NOTE — DISCHARGE INSTRUCTIONS
If you continue to have some visual changes would recommend follow-up with either an ophthalmologist or optometrist for dilated eye exam.   Zoryve Counseling:  I discussed with the patient that Zoryve is not for use in the eyes, mouth or vagina. The most commonly reported side effects include diarrhea, headache, insomnia, application site pain, upper respiratory tract infections, and urinary tract infections.  All of the patient's questions and concerns were addressed.

## 2024-10-29 NOTE — ED PROVIDER NOTES
[facility] EMERGENCY DEPT  EMERGENCY DEPARTMENT ENCOUNTER       Pt Name: Monica Hemphill  MRN: [id number]  Birthdate 1990  Date of evaluation: 10/28/2024  Provider: Eliezer Sanchez DO   PCP: Omkar Bull MD  Note Started: 12:08 AM EDT 10/29/24     CHIEF COMPLAINT       Chief Complaint   Patient presents with    Headache     BIBEMS for 10/10 headache, R sided blurred vision, and nausea since 1400. , stroke scale negative per EMS.        HISTORY OF PRESENT ILLNESS: 1 or more elements      History From: Patient, History limited by: none     Monica Hemphill is a 34 y.o. female presents to the emerged department by private vehicle for evaluation of headache.       Please See MDM for Additional Details of the HPI/PMH  Nursing Notes were all reviewed and agreed with or any disagreements were addressed in the HPI.     REVIEW OF SYSTEMS        Positives and Pertinent negatives as per HPI.    PAST HISTORY     Past Medical History:  Past Medical History:   Diagnosis Date    Abnormal Pap smear     Anxiety     Complication of anesthesia     never had    Depression     Mild episode of recurrent major depressive disorder (HCC) 4/21/2020    Sleep apnea        Past Surgical History:  Past Surgical History:   Procedure Laterality Date    COLONOSCOPY  10/2023    GYN      c section    TUBAL LIGATION  12/14/2021       Family History:  Family History   Problem Relation Age of Onset    No Known Problems Mother     Diabetes Father         Patient states no known medical issues - please remove    Hypertension Father         Patient states no known medical issues - please remove    Cancer Maternal Grandfather     Diabetes Maternal Grandfather     Hypertension Maternal Grandfather     Diabetes Paternal Grandmother        Social History:  Social History     Tobacco Use    Smoking status: Every Day     Current packs/day: 1.00     Average packs/day: 1 pack/day for 16.8 years (16.8 ttl pk-yrs)     Types: Cigarettes     Start date:

## 2024-10-30 NOTE — PROGRESS NOTES
[facility]  [address] [place] [address] [address]  [facility] [address]  Phone:  [phone]  Fax: [phone]       Chief Complaint   Patient presents with    Follow-up   .      SUBJECTIVE:    Monica Hemphill is a 34 y.o. female  Dictation on: 10/29/2024  8:38 PM by: REYNOLD BROWNLEE [[id number]]          Current Outpatient Medications   Medication Sig Dispense Refill    naproxen (NAPROSYN) 500 MG tablet Take 1 tablet by mouth 2 times daily 60 tablet 0    albuterol sulfate HFA (PROVENTIL;VENTOLIN;PROAIR) 108 (90 Base) MCG/ACT inhaler Inhale 2 puffs into the lungs every 6 hours as needed for Wheezing 18 g 0    fluticasone (FLONASE) 50 MCG/ACT nasal spray 2 sprays by Nasal route daily      ondansetron (ZOFRAN-ODT) 4 MG disintegrating tablet Take 1 tablet by mouth 3 times daily as needed for Nausea or Vomiting 21 tablet 0    butalbital-acetaminophen-caffeine (FIORICET, ESGIC) -40 MG per tablet Take 1 tablet by mouth every 6 hours as needed for Headaches 20 tablet 0    ibuprofen (ADVIL;MOTRIN) 800 MG tablet Take 1 tablet by mouth every 8 hours as needed for Pain (Patient not taking: Reported on 7/20/2024) 20 tablet 0    pantoprazole (PROTONIX) 40 MG tablet Take 1 tablet by mouth every morning (before breakfast) (Patient not taking: Reported on 2/1/2024) 30 tablet 1    busPIRone (BUSPAR) 10 MG tablet Take 1 tablet by mouth in the morning and 1 tablet at noon and 1 tablet in the evening. (Patient not taking: Reported on 7/15/2024)      loratadine (CLARITIN) 10 MG tablet Take by mouth daily (Patient not taking: Reported on 8/28/2023)      lurasidone (LATUDA) 40 MG TABS tablet Take by mouth daily (with breakfast) (Patient not taking: Reported on 7/15/2024)      pseudoephedrine (SUDAFED 12 HR) 120 MG extended release tablet Take by mouth 2 times daily as needed (Patient not taking: Reported on 8/28/2023)       No current facility-administered medications for this visit.     Past Medical History:

## 2024-11-01 ENCOUNTER — OFFICE VISIT (OUTPATIENT)
Facility: CLINIC | Age: 34
End: 2024-11-01
Payer: MEDICAID

## 2024-11-01 VITALS
TEMPERATURE: 98.1 F | BODY MASS INDEX: 40.57 KG/M2 | OXYGEN SATURATION: 98 % | SYSTOLIC BLOOD PRESSURE: 122 MMHG | RESPIRATION RATE: 16 BRPM | HEIGHT: 65 IN | DIASTOLIC BLOOD PRESSURE: 85 MMHG | WEIGHT: 243.5 LBS | HEART RATE: 67 BPM

## 2024-11-01 DIAGNOSIS — G44.1 VASCULAR HEADACHE: Primary | ICD-10-CM

## 2024-11-01 DIAGNOSIS — F41.9 ANXIETY: ICD-10-CM

## 2024-11-01 DIAGNOSIS — F31.81 BIPOLAR II DISORDER (HCC): ICD-10-CM

## 2024-11-01 DIAGNOSIS — S93.401D SPRAIN OF RIGHT ANKLE, UNSPECIFIED LIGAMENT, SUBSEQUENT ENCOUNTER: ICD-10-CM

## 2024-11-01 PROBLEM — G44.209 ACUTE TENSION-TYPE HEADACHE: Status: ACTIVE | Noted: 2024-11-01

## 2024-11-01 PROCEDURE — 99214 OFFICE O/P EST MOD 30 MIN: CPT | Performed by: INTERNAL MEDICINE

## 2024-11-01 NOTE — PROGRESS NOTES
Chief Complaint   Patient presents with    Follow-Up from Hospital     Patient here for ER follow up headaches and loss of vision in right eye. Reports pain 5/10 today and vision fine today.      \"Have you been to the ER, urgent care clinic since your last visit?  Hospitalized since your last visit?\"    YES - When: approximately 4 days ago.  Where and Why: [facility] for Headaches and loss of vision in right eye.    “Have you seen or consulted any other health care providers outside our system since your last visit?”    NO     “Have you had a pap smear?”    NO    No cervical cancer screening on file

## 2024-11-01 NOTE — PROGRESS NOTES
1. The patient will continue a brace for now to facilitate the recuperation from her sprained right ankle. She is making good progress.  2. She has cholelithiasis noted on the CT scan in the emergency room about a month ago. She was asymptomatic.  Conservative course for now.  3. She does have steatosis of the liver, which will improve with weight reduction.  This will be evaluated a bit more on her return visit.  4. She definitely needs to lose weight.  This can be accomplished by eating meals, eliminating snacks and avoiding the consumption of processed carbohydrates.

## 2024-11-01 NOTE — PROGRESS NOTES
[facility]  [facility]  [address]  [facility] [address]  Phone:  [phone]  Fax: [phone]    Chief Complaint   Patient presents with    Follow-Up from Hospital     Patient here for ER follow up headaches and loss of vision in right eye. Reports pain 5/10 today and vision fine today.          SUBECTIVE:    Monica Hemphill is a 34 y.o. female  Dictation on: 11/01/2024 11:51 AM by: REYNOLD BROWNLEE [[id number]]       Current Outpatient Medications   Medication Sig Dispense Refill    butalbital-acetaminophen-caffeine (FIORICET, ESGIC) -40 MG per tablet Take 1 tablet by mouth every 6 hours as needed for Headaches 20 tablet 0    albuterol sulfate HFA (PROVENTIL;VENTOLIN;PROAIR) 108 (90 Base) MCG/ACT inhaler Inhale 2 puffs into the lungs every 6 hours as needed for Wheezing 18 g 0    ondansetron (ZOFRAN-ODT) 4 MG disintegrating tablet Take 1 tablet by mouth 3 times daily as needed for Nausea or Vomiting (Patient not taking: Reported on 11/1/2024) 21 tablet 0    naproxen (NAPROSYN) 500 MG tablet Take 1 tablet by mouth 2 times daily (Patient not taking: Reported on 11/1/2024) 60 tablet 0    ibuprofen (ADVIL;MOTRIN) 800 MG tablet Take 1 tablet by mouth every 8 hours as needed for Pain (Patient not taking: Reported on 7/20/2024) 20 tablet 0    pantoprazole (PROTONIX) 40 MG tablet Take 1 tablet by mouth every morning (before breakfast) (Patient not taking: Reported on 2/1/2024) 30 tablet 1    busPIRone (BUSPAR) 10 MG tablet Take 1 tablet by mouth in the morning and 1 tablet at noon and 1 tablet in the evening. (Patient not taking: Reported on 7/15/2024)      fluticasone (FLONASE) 50 MCG/ACT nasal spray 2 sprays by Nasal route daily (Patient not taking: Reported on 11/1/2024)      loratadine (CLARITIN) 10 MG tablet Take by mouth daily (Patient not taking: Reported on 8/28/2023)      lurasidone (LATUDA) 40 MG TABS tablet Take by mouth daily (with breakfast) (Patient not taking:

## 2024-11-01 NOTE — PROGRESS NOTES
Comes in for return visit for follow up of a sprained right ankle sustained while playing a sport, specifically basketball. She has a brace that she got in the emergency room and it is doing quite nicely. She will be seeing an orthopedic physician in the next week or so. The pain has indeed decreased.    She has a past history of morbid obesity, cholelithiasis, as well as steatosis, above and beyond her existing schizoaffective disorder.

## 2024-11-03 ENCOUNTER — HOSPITAL ENCOUNTER (EMERGENCY)
Facility: HOSPITAL | Age: 34
Discharge: HOME | End: 2024-11-03
Attending: EMERGENCY MEDICINE
Payer: MEDICAID

## 2024-11-03 ENCOUNTER — APPOINTMENT (OUTPATIENT)
Facility: HOSPITAL | Age: 34
End: 2024-11-03
Payer: MEDICAID

## 2024-11-03 VITALS
SYSTOLIC BLOOD PRESSURE: 136 MMHG | DIASTOLIC BLOOD PRESSURE: 91 MMHG | RESPIRATION RATE: 18 BRPM | HEART RATE: 87 BPM | OXYGEN SATURATION: 99 % | WEIGHT: 243 LBS | HEIGHT: 65 IN | TEMPERATURE: 98.9 F | BODY MASS INDEX: 40.48 KG/M2

## 2024-11-03 DIAGNOSIS — R51.9 ACUTE INTRACTABLE HEADACHE, UNSPECIFIED HEADACHE TYPE: Primary | ICD-10-CM

## 2024-11-03 LAB
AMPHET UR QL SCN: NEGATIVE
ANION GAP SERPL CALC-SCNC: 5 MMOL/L (ref 2–12)
APPEARANCE UR: ABNORMAL
BACTERIA URNS QL MICRO: NEGATIVE /HPF
BARBITURATES UR QL SCN: POSITIVE
BASOPHILS # BLD: 0.1 K/UL (ref 0–0.1)
BASOPHILS NFR BLD: 0 % (ref 0–1)
BENZODIAZ UR QL: NEGATIVE
BILIRUB UR QL: NEGATIVE
BUN SERPL-MCNC: 9 MG/DL (ref 6–20)
BUN/CREAT SERPL: 13 (ref 12–20)
CALCIUM SERPL-MCNC: 8.8 MG/DL (ref 8.5–10.1)
CANNABINOIDS UR QL SCN: POSITIVE
CHLORIDE SERPL-SCNC: 106 MMOL/L (ref 97–108)
CO2 SERPL-SCNC: 30 MMOL/L (ref 21–32)
COCAINE UR QL SCN: NEGATIVE
COLOR UR: ABNORMAL
CREAT SERPL-MCNC: 0.67 MG/DL (ref 0.55–1.02)
DIFFERENTIAL METHOD BLD: ABNORMAL
EOSINOPHIL # BLD: 0.1 K/UL (ref 0–0.4)
EOSINOPHIL NFR BLD: 1 % (ref 0–7)
EPITH CASTS URNS QL MICRO: ABNORMAL /LPF
ERYTHROCYTE [DISTWIDTH] IN BLOOD BY AUTOMATED COUNT: 13.9 % (ref 11.5–14.5)
ERYTHROCYTE [SEDIMENTATION RATE] IN BLOOD: 12 MM/HR (ref 0–20)
GLUCOSE SERPL-MCNC: 87 MG/DL (ref 65–100)
GLUCOSE UR STRIP.AUTO-MCNC: NEGATIVE MG/DL
HCG UR QL: NEGATIVE
HCT VFR BLD AUTO: 41 % (ref 35–47)
HGB BLD-MCNC: 13.1 G/DL (ref 11.5–16)
HGB UR QL STRIP: NEGATIVE
IMM GRANULOCYTES # BLD AUTO: 0.1 K/UL (ref 0–0.04)
IMM GRANULOCYTES NFR BLD AUTO: 1 % (ref 0–0.5)
KETONES UR QL STRIP.AUTO: ABNORMAL MG/DL
LEUKOCYTE ESTERASE UR QL STRIP.AUTO: NEGATIVE
LYMPHOCYTES # BLD: 2.7 K/UL (ref 0.8–3.5)
LYMPHOCYTES NFR BLD: 22 % (ref 12–49)
Lab: ABNORMAL
MCH RBC QN AUTO: 27.8 PG (ref 26–34)
MCHC RBC AUTO-ENTMCNC: 32 G/DL (ref 30–36.5)
MCV RBC AUTO: 87 FL (ref 80–99)
METHADONE UR QL: NEGATIVE
MONOCYTES # BLD: 0.8 K/UL (ref 0–1)
MONOCYTES NFR BLD: 7 % (ref 5–13)
NEUTS SEG # BLD: 8.9 K/UL (ref 1.8–8)
NEUTS SEG NFR BLD: 69 % (ref 32–75)
NITRITE UR QL STRIP.AUTO: NEGATIVE
NRBC # BLD: 0 K/UL (ref 0–0.01)
NRBC BLD-RTO: 0 PER 100 WBC
OPIATES UR QL: NEGATIVE
PCP UR QL: NEGATIVE
PH UR STRIP: 6.5 (ref 5–8)
PLATELET # BLD AUTO: 209 K/UL (ref 150–400)
PMV BLD AUTO: 11.2 FL (ref 8.9–12.9)
POTASSIUM SERPL-SCNC: 3.6 MMOL/L (ref 3.5–5.1)
PROT UR STRIP-MCNC: ABNORMAL MG/DL
RBC # BLD AUTO: 4.71 M/UL (ref 3.8–5.2)
RBC #/AREA URNS HPF: ABNORMAL /HPF (ref 0–5)
SODIUM SERPL-SCNC: 141 MMOL/L (ref 136–145)
SP GR UR REFRACTOMETRY: 1.02
UROBILINOGEN UR QL STRIP.AUTO: 1 EU/DL (ref 0.2–1)
WBC # BLD AUTO: 12.7 K/UL (ref 3.6–11)
WBC URNS QL MICRO: ABNORMAL /HPF (ref 0–4)

## 2024-11-03 PROCEDURE — 6370000000 HC RX 637 (ALT 250 FOR IP): Performed by: EMERGENCY MEDICINE

## 2024-11-03 PROCEDURE — 80048 BASIC METABOLIC PNL TOTAL CA: CPT

## 2024-11-03 PROCEDURE — 81025 URINE PREGNANCY TEST: CPT

## 2024-11-03 PROCEDURE — 6360000002 HC RX W HCPCS: Performed by: EMERGENCY MEDICINE

## 2024-11-03 PROCEDURE — 96374 THER/PROPH/DIAG INJ IV PUSH: CPT

## 2024-11-03 PROCEDURE — 6360000004 HC RX CONTRAST MEDICATION: Performed by: EMERGENCY MEDICINE

## 2024-11-03 PROCEDURE — 80307 DRUG TEST PRSMV CHEM ANLYZR: CPT

## 2024-11-03 PROCEDURE — 81001 URINALYSIS AUTO W/SCOPE: CPT

## 2024-11-03 PROCEDURE — 99285 EMERGENCY DEPT VISIT HI MDM: CPT

## 2024-11-03 PROCEDURE — 96375 TX/PRO/DX INJ NEW DRUG ADDON: CPT

## 2024-11-03 PROCEDURE — 70498 CT ANGIOGRAPHY NECK: CPT

## 2024-11-03 PROCEDURE — 85025 COMPLETE CBC W/AUTO DIFF WBC: CPT

## 2024-11-03 PROCEDURE — 36415 COLL VENOUS BLD VENIPUNCTURE: CPT

## 2024-11-03 PROCEDURE — 85652 RBC SED RATE AUTOMATED: CPT

## 2024-11-03 PROCEDURE — 2580000003 HC RX 258: Performed by: EMERGENCY MEDICINE

## 2024-11-03 RX ORDER — 0.9 % SODIUM CHLORIDE 0.9 %
1000 INTRAVENOUS SOLUTION INTRAVENOUS ONCE
Status: COMPLETED | OUTPATIENT
Start: 2024-11-03 | End: 2024-11-03

## 2024-11-03 RX ORDER — DEXAMETHASONE SODIUM PHOSPHATE 10 MG/ML
10 INJECTION, SOLUTION INTRAMUSCULAR; INTRAVENOUS
Status: COMPLETED | OUTPATIENT
Start: 2024-11-03 | End: 2024-11-03

## 2024-11-03 RX ORDER — BUTALBITAL, ACETAMINOPHEN AND CAFFEINE 50; 325; 40 MG/1; MG/1; MG/1
2 TABLET ORAL
Status: COMPLETED | OUTPATIENT
Start: 2024-11-03 | End: 2024-11-03

## 2024-11-03 RX ORDER — KETOROLAC TROMETHAMINE 30 MG/ML
15 INJECTION, SOLUTION INTRAMUSCULAR; INTRAVENOUS
Status: COMPLETED | OUTPATIENT
Start: 2024-11-03 | End: 2024-11-03

## 2024-11-03 RX ORDER — IOPAMIDOL 755 MG/ML
100 INJECTION, SOLUTION INTRAVASCULAR
Status: COMPLETED | OUTPATIENT
Start: 2024-11-03 | End: 2024-11-03

## 2024-11-03 RX ORDER — KETOROLAC TROMETHAMINE 30 MG/ML
15 INJECTION, SOLUTION INTRAMUSCULAR; INTRAVENOUS
Status: DISCONTINUED | OUTPATIENT
Start: 2024-11-03 | End: 2024-11-03 | Stop reason: SDUPTHER

## 2024-11-03 RX ORDER — DIPHENHYDRAMINE HYDROCHLORIDE 50 MG/ML
25 INJECTION INTRAMUSCULAR; INTRAVENOUS ONCE
Status: COMPLETED | OUTPATIENT
Start: 2024-11-03 | End: 2024-11-03

## 2024-11-03 RX ORDER — BUTALBITAL, ACETAMINOPHEN AND CAFFEINE 50; 325; 40 MG/1; MG/1; MG/1
1 TABLET ORAL EVERY 4 HOURS PRN
Qty: 15 TABLET | Refills: 0 | Status: SHIPPED | OUTPATIENT
Start: 2024-11-03 | End: 2024-11-05

## 2024-11-03 RX ORDER — METOCLOPRAMIDE HYDROCHLORIDE 5 MG/ML
10 INJECTION INTRAMUSCULAR; INTRAVENOUS ONCE
Status: COMPLETED | OUTPATIENT
Start: 2024-11-03 | End: 2024-11-03

## 2024-11-03 RX ADMIN — IOPAMIDOL 100 ML: 755 INJECTION, SOLUTION INTRAVENOUS at 20:12

## 2024-11-03 RX ADMIN — BUTALBITAL, ACETAMINOPHEN AND CAFFEINE 2 TABLET: 325; 50; 40 TABLET ORAL at 18:49

## 2024-11-03 RX ADMIN — DEXAMETHASONE SODIUM PHOSPHATE 10 MG: 10 INJECTION, SOLUTION INTRAMUSCULAR; INTRAVENOUS at 18:51

## 2024-11-03 RX ADMIN — KETOROLAC TROMETHAMINE 15 MG: 30 INJECTION, SOLUTION INTRAMUSCULAR at 18:52

## 2024-11-03 RX ADMIN — DIPHENHYDRAMINE HYDROCHLORIDE 25 MG: 50 INJECTION INTRAMUSCULAR; INTRAVENOUS at 18:54

## 2024-11-03 RX ADMIN — METOCLOPRAMIDE HYDROCHLORIDE 10 MG: 5 INJECTION INTRAMUSCULAR; INTRAVENOUS at 18:56

## 2024-11-03 RX ADMIN — SODIUM CHLORIDE 1000 ML: 9 INJECTION, SOLUTION INTRAVENOUS at 19:14

## 2024-11-03 ASSESSMENT — PAIN - FUNCTIONAL ASSESSMENT: PAIN_FUNCTIONAL_ASSESSMENT: 0-10

## 2024-11-03 ASSESSMENT — PAIN DESCRIPTION - LOCATION: LOCATION: HEAD

## 2024-11-03 ASSESSMENT — PAIN SCALES - GENERAL
PAINLEVEL_OUTOF10: 4
PAINLEVEL_OUTOF10: 10

## 2024-11-03 ASSESSMENT — PAIN DESCRIPTION - DESCRIPTORS: DESCRIPTORS: ACHING

## 2024-11-03 ASSESSMENT — PAIN DESCRIPTION - PAIN TYPE: TYPE: ACUTE PAIN

## 2024-11-03 NOTE — ED PROVIDER NOTES
nasal spray  Commonly known as: FLONASE     ibuprofen 800 MG tablet  Commonly known as: ADVIL;MOTRIN  Take 1 tablet by mouth every 8 hours as needed for Pain     loratadine 10 MG tablet  Commonly known as: CLARITIN     lurasidone 40 MG Tabs tablet  Commonly known as: LATUDA     naproxen 500 MG tablet  Commonly known as: Naprosyn  Take 1 tablet by mouth 2 times daily     ondansetron 4 MG disintegrating tablet  Commonly known as: ZOFRAN-ODT  Take 1 tablet by mouth 3 times daily as needed for Nausea or Vomiting     pantoprazole 40 MG tablet  Commonly known as: PROTONIX  Take 1 tablet by mouth every morning (before breakfast)     pseudoephedrine 120 MG extended release tablet  Commonly known as: SUDAFED 12 HR                DISCONTINUED MEDICATIONS:  Discharge Medication List as of 11/3/2024  9:05 PM          I am the Primary Clinician of Record.   Yissel Malik MD (electronically signed)              Yissel Malik MD  11/05/24 5452

## 2024-11-03 NOTE — ED TRIAGE NOTES
Patient reports onset of severe headache associated with photophobia and nausea this morning.  She has taken a Fioricet with no relief.

## 2024-11-04 NOTE — ED NOTES
Verbal shift change report given to Arely RN (oncoming nurse) by Jonelle RN (offgoing nurse). Report included the following information Nurse Handoff Report, ED Encounter Summary, ED SBAR, Adult Overview, Neuro Assessment, and Event Log.

## 2024-11-04 NOTE — ED NOTES
Discharge instructions were given to the patient by Shanthi Vázquez RN.    The patient left the Emergency Department ambulatory, alert and oriented and in no acute distress with 1 prescriptions. The patient was encouraged to call or return to the ED for worsening issues or problems and was encouraged to schedule a follow up appointment for continuing care.    The patient verbalized understanding of discharge instructions and prescriptions, all questions were answered. The patient has no further concerns at this time.

## 2024-11-05 ENCOUNTER — OFFICE VISIT (OUTPATIENT)
Age: 34
End: 2024-11-05
Payer: MEDICAID

## 2024-11-05 VITALS
HEIGHT: 65 IN | DIASTOLIC BLOOD PRESSURE: 60 MMHG | OXYGEN SATURATION: 98 % | RESPIRATION RATE: 16 BRPM | SYSTOLIC BLOOD PRESSURE: 110 MMHG | TEMPERATURE: 97.4 F | HEART RATE: 93 BPM | BODY MASS INDEX: 40.45 KG/M2 | WEIGHT: 242.8 LBS

## 2024-11-05 DIAGNOSIS — G43.909 ACUTE MIGRAINE: ICD-10-CM

## 2024-11-05 DIAGNOSIS — G43.119 INTRACTABLE MIGRAINE WITH AURA WITHOUT STATUS MIGRAINOSUS: Primary | ICD-10-CM

## 2024-11-05 DIAGNOSIS — H54.7 VISUAL LOSS: ICD-10-CM

## 2024-11-05 DIAGNOSIS — H55.00 NYSTAGMUS: ICD-10-CM

## 2024-11-05 DIAGNOSIS — E55.9 VITAMIN D DEFICIENCY: ICD-10-CM

## 2024-11-05 PROBLEM — D50.9 IRON DEFICIENCY ANEMIA: Status: RESOLVED | Noted: 2020-05-18 | Resolved: 2024-11-05

## 2024-11-05 PROCEDURE — 99204 OFFICE O/P NEW MOD 45 MIN: CPT | Performed by: PSYCHIATRY & NEUROLOGY

## 2024-11-05 RX ORDER — TOPIRAMATE 50 MG/1
50 TABLET, FILM COATED ORAL 2 TIMES DAILY
Qty: 90 TABLET | Refills: 3 | Status: SHIPPED | OUTPATIENT
Start: 2024-11-05

## 2024-11-05 RX ORDER — METHYLPREDNISOLONE 4 MG/1
TABLET ORAL
Qty: 1 KIT | Refills: 1 | Status: SHIPPED | OUTPATIENT
Start: 2024-11-05

## 2024-11-05 ASSESSMENT — PATIENT HEALTH QUESTIONNAIRE - PHQ9
SUM OF ALL RESPONSES TO PHQ QUESTIONS 1-9: 0
2. FEELING DOWN, DEPRESSED OR HOPELESS: NOT AT ALL
SUM OF ALL RESPONSES TO PHQ9 QUESTIONS 1 & 2: 0
SUM OF ALL RESPONSES TO PHQ QUESTIONS 1-9: 0
SUM OF ALL RESPONSES TO PHQ QUESTIONS 1-9: 0
1. LITTLE INTEREST OR PLEASURE IN DOING THINGS: NOT AT ALL
SUM OF ALL RESPONSES TO PHQ QUESTIONS 1-9: 0

## 2024-11-05 NOTE — ASSESSMENT & PLAN NOTE
This is an acute new onset of migraine with aura in someone who has not had a history of headaches and migraines in the past no history of significant head trauma and no family history.     She is having prolonged auras which is very unusual sometimes as long as 8 hours of visual loss and unilateral    CT and CTA in the ED were unremarkable but patient clearly needs to have an MRI scan of the brain to evaluate the atypical symptoms more closely and I agree with the MRA that the primary care ordered as well    Additional blood work will be ordered to evaluate for infectious inflammatory metabolic contributing factors  This is rather concerning    For now are going to treat her headaches to dry to get them under better control until we have a better understanding of the pathology    Realovy was given in the office today to help down regulate the headache for the next 30 days  Topamax 50 mg nightly was started  Medrol Dosepak was prescribed to be started either today or tomorrow with 1 refill if needed  Patient is to minimize the use of rescue medication to no more than 2 days/week for now she is using the butalbital she is to stay off all OTCs      Orders:    topiramate (TOPAMAX) 50 MG tablet; Take 1 tablet by mouth 2 times daily    methylPREDNISolone (MEDROL DOSEPACK) 4 MG tablet; Take by mouth.    Fremanezumab-rm SOAJ 225 mg    TSH; Future    Sedimentation Rate; Future    Angiotensin Converting Enzyme; Future    Lyme Disease Total Antibody With Reflex to Immunoassay; Future    Vitamin B6; Future    Vitamin B1, Whole Blood; Future    Vitamin B12; Future    JESSICA Comprehensive Plus Panel; Future    Vitamin D 25 Hydroxy    C-Reactive Protein; Future

## 2024-11-05 NOTE — ASSESSMENT & PLAN NOTE
This is an acute new onset of migraine with aura in someone who has not had a history of headaches and migraines in the past no history of significant head trauma and no family history.     She is having prolonged auras which is very unusual sometimes as long as 8 hours of visual loss and unilateral    CT and CTA in the ED were unremarkable but patient clearly needs to have an MRI scan of the brain to evaluate the atypical symptoms more closely and I agree with the MRA that the primary care ordered as well    Additional blood work will be ordered to evaluate for infectious inflammatory metabolic contributing factors  This is rather concerning    For now are going to treat her headaches to dry to get them under better control until we have a better understanding of the pathology    Ajovy was given in the office today to help down regulate the headache for the next 30 days  Topamax 50 mg nightly was started  Medrol Dosepak was prescribed to be started either today or tomorrow with 1 refill if needed  Patient is to minimize the use of rescue medication to no more than 2 days/week for now she is using the butalbital she is to stay off all OTCs

## 2024-11-05 NOTE — PATIENT INSTRUCTIONS
As a reminder:   Please come to your appointment 15 minutes before your office appointment.  This way, you can get checked in at the  and checked in by the nursing staff so you have the full allotment of time with your provider for your visit.  Please bring an up-to-date and accurate list of all your medications.  Or bring all your active prescription bottles with you at the time of your office visit and this includes over-the-counter medications so we can make sure that your medication list is up-to-date.  If you are scheduled for a virtual visit, please be aware that the  will need to check you in and usually the day before to verify insurance and collect co-pays as appropriate.  Please be prepared for the second call which will be from the nurse to go over your medications and any other vital information.  This will probably be done 30 minutes prior to your visit.  The reason why we do this early is that you can get the full benefit of your appointment time with your provider.  Finally you will be given the link for your virtual visit please click into your link 10 minutes prior to your appointment and please wait patiently for the provider to join you        As per discussion  We have given you shot of Ajovy today in the office to help down regulate your headaches and migraines for up to 30 days while we get better control over this    I have started you on Topamax 50 mg take it at bedtime but please clear this with your mental health team before starting    I have also started you on a Medrol Dosepak that you can  today and either start this morning or you can wait till tomorrow just started to help down regulate your headaches and migraines and there is 1 refill on that should you need it      You can continue to use the butalbital but not more than 2 days in a 7-day cycle to manage your headaches and do not substitute those other days with over-the-counter medications overuse too

## 2024-11-05 NOTE — PROGRESS NOTES
07/12/2022       No results found for: \"LPMZQUYR93\"      No Known Allergies     Current Outpatient Medications   Medication Sig Dispense Refill    topiramate (TOPAMAX) 50 MG tablet Take 1 tablet by mouth 2 times daily 90 tablet 3    methylPREDNISolone (MEDROL DOSEPACK) 4 MG tablet Take by mouth. 1 kit 1    butalbital-acetaminophen-caffeine (FIORICET, ESGIC) -40 MG per tablet Take 1 tablet by mouth every 6 hours as needed for Headaches 20 tablet 0    albuterol sulfate HFA (PROVENTIL;VENTOLIN;PROAIR) 108 (90 Base) MCG/ACT inhaler Inhale 2 puffs into the lungs every 6 hours as needed for Wheezing 18 g 0     No current facility-administered medications for this visit.        Past medical history/surgical history, family history, and social history have been reviewed for today's visit        Review of Systems   All other systems reviewed and are negative.         EXAMINATION:   Hand Dominance []Right [x]Left    Vitals:    11/05/24 0853   BP: 110/60   Site: Right Upper Arm   Position: Sitting   Cuff Size: Large Adult   Pulse: 93   Resp: 16   Temp: 97.4 °F (36.3 °C)   TempSrc: Temporal   SpO2: 98%   Weight: 110.1 kg (242 lb 12.8 oz)   Height: 1.651 m (5' 5\")              11/5/2024     8:50 AM   PHQ-9    Little interest or pleasure in doing things 0   Feeling down, depressed, or hopeless 0   PHQ-2 Score 0   PHQ-9 Total Score 0        General:  Patient is well-developed and well-nourished in no apparent distress  Affect is appropriate   Normocephalic without evidence of trauma  Heart sounds normal no additional sounds heard breath sounds clear to auscultation no cervical masses or bruits appreciated  No tenderness in the head or neck area to palpation  Good peripheral pulses no edema is noted  No rashes unusual bruising or bleeding lacerations are atypical discoloration appreciated on general inspection    Neuro Exam  Mental status  Alert and oriented x3  No word blocking appreciated  No evidence of processing

## 2024-11-06 LAB
25(OH)D3+25(OH)D2 SERPL-MCNC: 10.3 NG/ML (ref 30–100)
B BURGDOR IGG+IGM SER QL IA: NEGATIVE
CENTROMERE B AB SER-ACNC: <0.2 AI (ref 0–0.9)
CHROMATIN AB SERPL-ACNC: <0.2 AI (ref 0–0.9)
DSDNA AB SER-ACNC: <1 IU/ML (ref 0–9)
ENA JO1 AB SER-ACNC: <0.2 AI (ref 0–0.9)
ENA RNP AB SER-ACNC: 0.3 AI (ref 0–0.9)
ENA SCL70 AB SER-ACNC: <0.2 AI (ref 0–0.9)
ENA SM AB SER-ACNC: <0.2 AI (ref 0–0.9)
ENA SM+RNP AB SER-ACNC: <0.2 AI (ref 0–0.9)
ENA SS-A AB SER-ACNC: <0.2 AI (ref 0–0.9)
ENA SS-B AB SER-ACNC: <0.2 AI (ref 0–0.9)
ERYTHROCYTE [SEDIMENTATION RATE] IN BLOOD BY WESTERGREN METHOD: 17 MM/HR (ref 0–32)
Lab: NORMAL
RIBOSOMAL P AB SER-ACNC: <0.2 AI (ref 0–0.9)
TSH SERPL DL<=0.005 MIU/L-ACNC: 3.02 UIU/ML (ref 0.45–4.5)
VIT B12 SERPL-MCNC: 444 PG/ML (ref 232–1245)

## 2024-11-06 RX ORDER — ERGOCALCIFEROL 1.25 MG/1
50000 CAPSULE, LIQUID FILLED ORAL WEEKLY
Qty: 12 CAPSULE | Refills: 3 | Status: SHIPPED | OUTPATIENT
Start: 2024-11-06

## 2024-11-07 LAB
ACE SERPL-CCNC: 49 U/L (ref 14–82)
CRP SERPL-MCNC: 5 MG/L (ref 0–10)

## 2024-11-08 LAB — VIT B1 BLD-SCNC: 93.9 NMOL/L (ref 66.5–200)

## 2024-11-09 LAB — PYRIDOXAL PHOS SERPL-MCNC: 2.8 UG/L (ref 3.4–65.2)

## 2024-11-11 ENCOUNTER — OFFICE VISIT (OUTPATIENT)
Facility: CLINIC | Age: 34
End: 2024-11-11

## 2024-11-11 ENCOUNTER — HOSPITAL ENCOUNTER (OUTPATIENT)
Facility: HOSPITAL | Age: 34
Discharge: HOME | End: 2024-11-14
Payer: MEDICAID

## 2024-11-11 VITALS
TEMPERATURE: 98.3 F | OXYGEN SATURATION: 95 % | DIASTOLIC BLOOD PRESSURE: 80 MMHG | HEIGHT: 65 IN | BODY MASS INDEX: 39.69 KG/M2 | SYSTOLIC BLOOD PRESSURE: 115 MMHG | HEART RATE: 109 BPM | RESPIRATION RATE: 16 BRPM | WEIGHT: 238.2 LBS

## 2024-11-11 VITALS — BODY MASS INDEX: 40.44 KG/M2 | WEIGHT: 243 LBS

## 2024-11-11 DIAGNOSIS — Z12.4 ENCOUNTER FOR PAPANICOLAOU SMEAR FOR CERVICAL CANCER SCREENING: Primary | ICD-10-CM

## 2024-11-11 DIAGNOSIS — G44.1 VASCULAR HEADACHE: ICD-10-CM

## 2024-11-11 DIAGNOSIS — F31.81 BIPOLAR II DISORDER (HCC): ICD-10-CM

## 2024-11-11 PROCEDURE — 70544 MR ANGIOGRAPHY HEAD W/O DYE: CPT

## 2024-11-11 NOTE — PATIENT INSTRUCTIONS
Patient Education        Learning About Cervical Cancer Screening  Cervical cancer screening helps prevent cervical cancer. The tests include the Pap test and the HPV (human papillomavirus) test. These tests may be used alone or together. You and your doctor can talk about what screening test is best for you.    The cervix is the lower part of the uterus that opens into the vagina.   The Pap test checks for changes in the cells on the cervix. The HPV test looks for high-risk viruses that can cause cell changes that could lead to cervical cancer.   When should you have a screening test?  Depending on your age, your risk of cervical cancer, and your results from previous tests, you may need cervical cancer screening. Here is a general guide for when screening may be recommended.        Ages 21 to 65   A Pap test. Starting at age 21, you may get this test. If your results are normal, you can wait 3 years to have another test.  An HPV test. Starting at age 25, you may get this test. If your results are negative, you can wait 5 years to have another test.  A Pap test and HPV test. Starting at age 30, you may get both. If your results are normal, you can wait 5 years to be tested again.        Ages 66 and older   Talk to your doctor. If you've always had normal screening results, you may not need screening.        If you had a hysterectomy   Talk to your doctor. Depending on your health history, you may not need screening.  What do the results mean?    A normal Pap test means that there were no cell changes on your cervix. A negative HPV test means that no high-risk HPV viruses were found.   Pap results may show minor changes to the cells on your cervix or cell changes that could turn into cancer. Minor changes may go away on their own. Tests could also show that you have a high-risk type of HPV.   What to do next  Talk to your doctor if you have any questions about your results. The doctor will go over the next steps and

## 2024-11-11 NOTE — ASSESSMENT & PLAN NOTE
-PMHx of bipolar disorder  -Not currently medically managed  -Expresses interest in reestablishing with psychiatry to reinitiate her medications  -Psychiatry referral placed    Orders:    [facility] - Ayanna Andre MD, Psychiatry, [place]

## 2024-11-11 NOTE — PROGRESS NOTES
Chief Complaint   Patient presents with    Establish Care     Pt states that she is here establishing care and states that she would also like a pap smear if possible pt also states that she experienced some blood in stool this morning and is in need of a referral for a therapist.     New Patient       
33 02/22/2023 12:00 AM    HDL 26 07/12/2022 12:00 AM    GLUCOSE 87 11/03/2024 06:45 PM    LABA1C 5.5 08/28/2023 12:00 AM    LABA1C 5.8 02/22/2023 12:00 AM    LABA1C 5.7 07/12/2022 12:00 AM       The ASCVD Risk score (Luciana STEWART, et al., 2019) failed to calculate for the following reasons:    The 2019 ASCVD risk score is only valid for ages 40 to 79    Immunization History   Administered Date(s) Administered    COVID-19, J&J, (age 18y+), IM, 0.5 mL 12/31/2021    COVID-19, PFIZER Bivalent, DO NOT Dilute, (age 12y+), IM, 30 mcg/0.3 mL 12/01/2021       Health Maintenance   Topic Date Due    Pneumococcal 0-64 years Vaccine (1 of 2 - PCV) Never done    Varicella vaccine (1 of 2 - 13+ 2-dose series) Never done    HIV screen  Never done    Hepatitis C screen  Never done    Hepatitis B vaccine (1 of 3 - 19+ 3-dose series) Never done    DTaP/Tdap/Td vaccine (1 - Tdap) Never done    Cervical cancer screen  Never done    Flu vaccine (1) Never done    COVID-19 Vaccine (3 - 2023-24 season) 09/01/2024    Depression Monitoring  11/05/2025    Hepatitis A vaccine  Aged Out    Hib vaccine  Aged Out    HPV vaccine  Aged Out    Polio vaccine  Aged Out    Meningococcal (ACWY) vaccine  Aged Out    A1C test (Diabetic or Prediabetic)  Discontinued           Assessment & Plan  Encounter for Papanicolaou smear for cervical cancer screening  -Due for cervical cancer screening  -Patient amenable  -Pelvic exam/Pap test performed  -Provided educational resources regarding cervical cancer screening  -Will contact with results    Orders:    HIV 1/2 Ag/Ab, 4TH Generation,W Rflx Confirm; Future    Hepatitis C Antibody; Future    PAP LB, Reflex HPV ASCUS (199300); Future    PAP LB, Reflex HPV ASCUS (199300)    Bipolar II disorder (HCC)  -PMHx of bipolar disorder  -Not currently medically managed  -Expresses interest in reestablishing with psychiatry to reinitiate her medications  -Psychiatry referral placed    Orders:    [facility] - Ayanna Andre,

## 2024-11-12 LAB
HCV IGG SERPL QL IA: NON REACTIVE
HIV 1+2 AB+HIV1 P24 AG SERPL QL IA: NON REACTIVE

## 2024-11-15 LAB
., LABCORP: NORMAL
CYTOLOGIST CVX/VAG CYTO: NORMAL
CYTOLOGY CVX/VAG DOC CYTO: NORMAL
CYTOLOGY CVX/VAG DOC THIN PREP: NORMAL
DX ICD CODE: NORMAL
Lab: NORMAL
OTHER STN SPEC: NORMAL
STAT OF ADQ CVX/VAG CYTO-IMP: NORMAL

## 2024-11-26 ENCOUNTER — TELEMEDICINE (OUTPATIENT)
Age: 34
End: 2024-11-26
Payer: MEDICAID

## 2024-11-26 DIAGNOSIS — E55.9 VITAMIN D DEFICIENCY: ICD-10-CM

## 2024-11-26 DIAGNOSIS — G43.119 INTRACTABLE MIGRAINE WITH AURA WITHOUT STATUS MIGRAINOSUS: Primary | ICD-10-CM

## 2024-11-26 DIAGNOSIS — G43.909 ACUTE MIGRAINE: ICD-10-CM

## 2024-11-26 DIAGNOSIS — F31.81 BIPOLAR II DISORDER (HCC): ICD-10-CM

## 2024-11-26 PROBLEM — G44.209 ACUTE TENSION-TYPE HEADACHE: Status: RESOLVED | Noted: 2024-11-01 | Resolved: 2024-11-26

## 2024-11-26 PROCEDURE — 99215 OFFICE O/P EST HI 40 MIN: CPT | Performed by: PSYCHIATRY & NEUROLOGY

## 2024-11-26 RX ORDER — RIZATRIPTAN BENZOATE 10 MG/1
TABLET ORAL
Qty: 9 TABLET | Refills: 11 | Status: SHIPPED | OUTPATIENT
Start: 2024-11-26

## 2024-11-26 ASSESSMENT — PATIENT HEALTH QUESTIONNAIRE - PHQ9
2. FEELING DOWN, DEPRESSED OR HOPELESS: NOT AT ALL
SUM OF ALL RESPONSES TO PHQ QUESTIONS 1-9: 0
SUM OF ALL RESPONSES TO PHQ QUESTIONS 1-9: 0
1. LITTLE INTEREST OR PLEASURE IN DOING THINGS: NOT AT ALL
SUM OF ALL RESPONSES TO PHQ QUESTIONS 1-9: 0
SUM OF ALL RESPONSES TO PHQ9 QUESTIONS 1 & 2: 0
SUM OF ALL RESPONSES TO PHQ QUESTIONS 1-9: 0

## 2024-11-26 NOTE — PROGRESS NOTES
importance of compliance with the treatment plan as well as documenting on the day of the visit.      The patient (or guardian, if applicable) and other individuals in attendance with the patient were advised that Artificial Intelligence will be utilized during this visit to record, process the conversation to generate a clinical note, and support improvement of the AI technology. The patient (or guardian, if applicable) and other individuals in attendance at the appointment consented to the use of AI, including the recording.          --Monica Patel, ANP

## 2024-11-26 NOTE — PATIENT INSTRUCTIONS
As a reminder:   Please come to your appointment 15 minutes before your office appointment.  This way, you can get checked in at the  and checked in by the nursing staff so you have the full allotment of time with your provider for your visit.  Please bring an up-to-date and accurate list of all your medications.  Or bring all your active prescription bottles with you at the time of your office visit and this includes over-the-counter medications so we can make sure that your medication list is up-to-date.  If you are scheduled for a virtual visit, please be aware that the  will need to check you in and usually the day before to verify insurance and collect co-pays as appropriate.  Please be prepared for the second call which will be from the nurse to go over your medications and any other vital information.  This will probably be done 30 minutes prior to your visit.  The reason why we do this early is that you can get the full benefit of your appointment time with your provider.  Finally you will be given the link for your virtual visit please click into your link 10 minutes prior to your appointment and please wait patiently for the provider to join you        vitamin D (ERGOCALCIFEROL) 1.25 MG (61562 UT) CAPS capsule 12 capsule 3 11/6/2024 --    Sig - Route: Take 1 capsule by mouth once a week - Oral    Sent to pharmacy as: Vitamin D (Ergocalciferol) 1.25 MG (46872 UT) Oral Capsule (ERGOCALCIFEROL)    E-Prescribing Status: Receipt confirmed by pharmacy (11/6/2024 12:50 PM EST)        As per discussion  Patient Information:  Name: Arsh Souza  Age: 34  Medical History: History of bipolar disorder and persistent headaches.    Reason for Visit:  Arsh Souza visited for evaluation of persistent headaches. She has been experiencing headaches 3 to 4 times a week, each lasting 3 to 4 hours. The headaches have slightly improved after increasing her Topamax dosage to two tablets nightly.  She

## 2024-11-26 NOTE — ASSESSMENT & PLAN NOTE
Lab Results   Component Value Date/Time    VITD25 10.3 11/05/2024 12:00 AM      Patient is now started supplementation 50,000 units weekly    A prescription was sent to her pharmacy with a 3-month supply and refills x 3 for some reason her pharmacy only gave her a month supply.  I have asked her to please check that with her pharmacy they need to look at my original order to make sure they give her the right amount of medication.  Patient states she will check with her pharmacy today when she goes to  the rizatriptan

## 2024-11-26 NOTE — ASSESSMENT & PLAN NOTE
Presently resolved  Migraines back to baseline and episodic  Continue on Topamax 100 mg/day  Will add in rizatriptan for rescue medicine with butalbital as backup to the rizatriptan

## 2024-11-26 NOTE — ASSESSMENT & PLAN NOTE
Patient has made an appointment to reestablish with her mental health team.  She is on the waiting list tentative appointment is for April 2025 she is on a wait list to be seen sooner.  I have encouraged her to make sure she keeps that appointment and hopefully they will be able to work her in sooner

## 2024-11-27 ENCOUNTER — HOSPITAL ENCOUNTER (OUTPATIENT)
Facility: HOSPITAL | Age: 34
Discharge: HOME | End: 2024-11-30
Payer: MEDICAID

## 2024-11-27 DIAGNOSIS — H54.7 VISUAL LOSS: ICD-10-CM

## 2024-11-27 DIAGNOSIS — H55.00 NYSTAGMUS: ICD-10-CM

## 2024-11-27 PROCEDURE — 70553 MRI BRAIN STEM W/O & W/DYE: CPT

## 2024-11-27 PROCEDURE — A9579 GAD-BASE MR CONTRAST NOS,1ML: HCPCS | Performed by: PSYCHIATRY & NEUROLOGY

## 2024-11-27 PROCEDURE — 6360000004 HC RX CONTRAST MEDICATION: Performed by: PSYCHIATRY & NEUROLOGY

## 2024-11-27 RX ADMIN — GADOTERIDOL 20 ML: 279.3 INJECTION, SOLUTION INTRAVENOUS at 08:22

## 2024-12-09 NOTE — TELEPHONE ENCOUNTER
Monica Case [facility] Clinical Staff (supporting KANDACE Gunn)13 hours ago (7:04 PM)     TH  Hey I wanted to know if I could get the shots Ajovy again I feel like it helped.       Last office visit 11/26/2024  Next office visit 7/14/2025  Last med refill

## 2024-12-10 NOTE — TELEPHONE ENCOUNTER
[facility] Clinical Staff (supporting Monica Patel, ANP)Yesterday (8:57 AM)     TH  She gave it to me when I was in the office        You  Monica Candelariosterday (8:55 AM)     ANJANA Anderson,  I am looking back thru your chart did Monica give you a shot of ajovy in the office?  I am not seeing where that has been ordered for you on your medication list. Just want to be able to give Monica all information to help get the medication for you       Monica Roy [facility] Clinical Staff (supporting Monica Patel, ANP)2 days ago     TH  Hey I wanted to know if I could get the shots Ajovy again I feel like it helped.     Last offfice visit 11/26/2024  Next office visit 7/14/2025  Received one shot in office

## 2024-12-10 NOTE — TELEPHONE ENCOUNTER
Unfortunately the insurance companies are not going to approve this because she has not been tried on preventative medications.  Do me a favor and set up an appointment for a virtual visit sometime this week with this patient so we can discuss what our next steps are at this time.    In the meantime, we can bring her in to do an in office injection of the Ajovy if she would like

## 2024-12-22 ENCOUNTER — OFFICE VISIT (OUTPATIENT)
Age: 34
End: 2024-12-22

## 2024-12-22 VITALS
WEIGHT: 236.8 LBS | DIASTOLIC BLOOD PRESSURE: 89 MMHG | OXYGEN SATURATION: 95 % | BODY MASS INDEX: 39.41 KG/M2 | TEMPERATURE: 97.9 F | HEART RATE: 87 BPM | SYSTOLIC BLOOD PRESSURE: 127 MMHG

## 2024-12-22 DIAGNOSIS — J01.00 ACUTE NON-RECURRENT MAXILLARY SINUSITIS: ICD-10-CM

## 2024-12-22 DIAGNOSIS — J45.901 EXACERBATION OF ASTHMA, UNSPECIFIED ASTHMA SEVERITY, UNSPECIFIED WHETHER PERSISTENT: Primary | ICD-10-CM

## 2024-12-22 RX ORDER — BENZONATATE 100 MG/1
100 CAPSULE ORAL 3 TIMES DAILY PRN
Qty: 30 CAPSULE | Refills: 0 | Status: SHIPPED | OUTPATIENT
Start: 2024-12-22 | End: 2025-01-01

## 2024-12-22 RX ORDER — ALBUTEROL SULFATE 90 UG/1
2 INHALANT RESPIRATORY (INHALATION) 4 TIMES DAILY PRN
Qty: 18 G | Refills: 0 | Status: SHIPPED | OUTPATIENT
Start: 2024-12-22

## 2024-12-22 RX ORDER — PREDNISONE 20 MG/1
40 TABLET ORAL DAILY
Qty: 10 TABLET | Refills: 0 | Status: SHIPPED | OUTPATIENT
Start: 2024-12-22 | End: 2024-12-27

## 2024-12-22 RX ORDER — FLUTICASONE PROPIONATE 50 MCG
1 SPRAY, SUSPENSION (ML) NASAL DAILY
Qty: 16 G | Refills: 0 | Status: SHIPPED | OUTPATIENT
Start: 2024-12-22

## 2025-01-16 DIAGNOSIS — E55.9 VITAMIN D DEFICIENCY: ICD-10-CM

## 2025-01-16 DIAGNOSIS — G43.119 INTRACTABLE MIGRAINE WITH AURA WITHOUT STATUS MIGRAINOSUS: ICD-10-CM

## 2025-01-17 RX ORDER — RIZATRIPTAN BENZOATE 10 MG/1
TABLET ORAL
Qty: 9 TABLET | Refills: 11 | OUTPATIENT
Start: 2025-01-17

## 2025-01-20 RX ORDER — ERGOCALCIFEROL 1.25 MG/1
50000 CAPSULE, LIQUID FILLED ORAL WEEKLY
Qty: 4 CAPSULE | Refills: 0 | Status: SHIPPED | OUTPATIENT
Start: 2025-01-20

## 2025-02-28 ENCOUNTER — HOSPITAL ENCOUNTER (EMERGENCY)
Facility: HOSPITAL | Age: 35
Discharge: HOME | End: 2025-02-28
Attending: STUDENT IN AN ORGANIZED HEALTH CARE EDUCATION/TRAINING PROGRAM
Payer: MEDICAID

## 2025-02-28 VITALS
OXYGEN SATURATION: 98 % | WEIGHT: 235 LBS | DIASTOLIC BLOOD PRESSURE: 81 MMHG | RESPIRATION RATE: 16 BRPM | BODY MASS INDEX: 39.15 KG/M2 | TEMPERATURE: 98.8 F | SYSTOLIC BLOOD PRESSURE: 136 MMHG | HEIGHT: 65 IN | HEART RATE: 81 BPM

## 2025-02-28 DIAGNOSIS — B34.9 VIRAL SYNDROME: Primary | ICD-10-CM

## 2025-02-28 PROCEDURE — 87636 SARSCOV2 & INF A&B AMP PRB: CPT

## 2025-02-28 PROCEDURE — 6360000002 HC RX W HCPCS: Performed by: STUDENT IN AN ORGANIZED HEALTH CARE EDUCATION/TRAINING PROGRAM

## 2025-02-28 PROCEDURE — 99283 EMERGENCY DEPT VISIT LOW MDM: CPT

## 2025-02-28 PROCEDURE — 6370000000 HC RX 637 (ALT 250 FOR IP): Performed by: STUDENT IN AN ORGANIZED HEALTH CARE EDUCATION/TRAINING PROGRAM

## 2025-02-28 RX ORDER — DEXAMETHASONE SODIUM PHOSPHATE 4 MG/ML
10 INJECTION, SOLUTION INTRA-ARTICULAR; INTRALESIONAL; INTRAMUSCULAR; INTRAVENOUS; SOFT TISSUE ONCE
Status: COMPLETED | OUTPATIENT
Start: 2025-02-28 | End: 2025-02-28

## 2025-02-28 RX ORDER — ACETAMINOPHEN 500 MG
1000 TABLET ORAL ONCE
Status: COMPLETED | OUTPATIENT
Start: 2025-02-28 | End: 2025-02-28

## 2025-02-28 RX ADMIN — DEXAMETHASONE SODIUM PHOSPHATE 10 MG: 4 INJECTION INTRA-ARTICULAR; INTRALESIONAL; INTRAMUSCULAR; INTRAVENOUS; SOFT TISSUE at 08:33

## 2025-02-28 RX ADMIN — ACETAMINOPHEN 1000 MG: 500 TABLET ORAL at 08:34

## 2025-02-28 ASSESSMENT — PAIN DESCRIPTION - DESCRIPTORS: DESCRIPTORS: ACHING

## 2025-02-28 ASSESSMENT — PAIN - FUNCTIONAL ASSESSMENT: PAIN_FUNCTIONAL_ASSESSMENT: 0-10

## 2025-02-28 ASSESSMENT — PAIN DESCRIPTION - PAIN TYPE: TYPE: ACUTE PAIN

## 2025-02-28 ASSESSMENT — PAIN DESCRIPTION - LOCATION: LOCATION: GENERALIZED

## 2025-02-28 ASSESSMENT — PAIN SCALES - GENERAL: PAINLEVEL_OUTOF10: 6

## 2025-02-28 NOTE — ED NOTES
Pt presents to ED complaining of sore throat and body aches x 2 days. Pt denies coughs and shortness of breath, and has a hx of asthma. Pt is alert and oriented x 4, RR even and unlabored, skin is warm and dry. Pt appears in NAD at this time. Assessment completed and pt updated on plan of care.  Call bell in reach.   Emergency Department Nursing Plan of Care  The Nursing Plan of Care is developed from the Nursing assessment and Emergency Department Attending provider initial evaluation.  The plan of care may be reviewed in the “ED Provider note”.  The Plan of Care was developed with the following considerations:  Patient / Family readiness to learn indicated by:Refer to Medical chart in [facility]  Persons(s) to be included in education: Refer to Medical chart in [facility]  Barriers to Learning/Limitations:Normal

## 2025-02-28 NOTE — ED NOTES
Discharge instructions were given to the patient by Dioni Sears RN  .  The patient left the Emergency Department alert and oriented and in no acute distress with 0 prescription(s). The patient was encouraged to call or return to the ED for worsening issues or problems and was encouraged to schedule a follow up appointment for continuing care.  The patient verbalized understanding of discharge instructions and prescriptions; all questions were answered. The patient has no further concerns at this time.

## 2025-02-28 NOTE — ED TRIAGE NOTES
Pt states she has been having cold symptoms since Tuesday. The symptoms include runny nose, body aches and pressure headaches. Pt states that she took OTC meds and is getting no relief.

## 2025-02-28 NOTE — DISCHARGE INSTRUCTIONS
Please make a followup with your primary care physician.  If you have any new or worsening concerning medical symptoms please return to the emergency department.    You can take over-the counter medications to help with your symptoms.

## 2025-02-28 NOTE — ED PROVIDER NOTES
[facility] EMERGENCY DEPARTMENT  EMERGENCY DEPARTMENT ENCOUNTER       Pt Name: Monica Hemphill  MRN: [id number]  Birthdate 1990  Date of evaluation: 2/28/2025  Provider: Clay York MD   PCP: Omkar Bull MD  Note Started: 8:59 AM EST 2/28/25     CHIEF COMPLAINT       Chief Complaint   Patient presents with    Cold Symptoms     HISTORY OF PRESENT ILLNESS: 1 or more elements      History From: patient, History limited by: none     Monica Hemphill is a 34 y.o. female w hx of asthma who has been having constellation of symptoms that have been present for the past three days.  She originally had some cough and sore throat which have largely resolved.  She has continued hoarse voice, runny nose, body aches, and headaches.  Her daughter developed similar symptoms yesterday and accompanies her to the ED.  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    Please See MDM for Additional Details of the HPI/PMH  Nursing Notes were all reviewed and agreed with or any disagreements were addressed in the HPI.     REVIEW OF SYSTEMS        Positives and Pertinent negatives as per HPI.    PAST HISTORY     Past Medical History:  Past Medical History:   Diagnosis Date    Abdominal pain, epigastric 11/24/2011    Abnormal Pap smear     Acute tension-type headache 11/01/2024    Anxiety     Complication of anesthesia     never had    Depression     Iron deficiency anemia 05/18/2020    Mild episode of recurrent major depressive disorder 4/21/2020    Sleep apnea        Past Surgical History:  Past Surgical History:   Procedure Laterality Date    COLONOSCOPY  10/2023    GYN      c section    TUBAL LIGATION  12/14/2021       Family History:  Family History   Problem Relation Age of Onset    No Known Problems Mother     Diabetes Father         Patient states no known medical issues - please remove    Hypertension Father         Patient states no known medical issues - please remove    Cancer Maternal Grandfather      4 times daily as needed for Wheezing     butalbital-acetaminophen-caffeine -40 MG per tablet  Commonly known as: FIORICET, ESGIC  Take 1 tablet by mouth every 6 hours as needed for Headaches     fluticasone 50 MCG/ACT nasal spray  Commonly known as: FLONASE  1 spray by Each Nostril route daily     rizatriptan 10 MG tablet  Commonly known as: MAXALT  1 tablet at onset of migraine may repeat every 2 hours to a maximum of 3 tablets per 24 hours in 2 days per 7-day cycle     topiramate 50 MG tablet  Commonly known as: Topamax  Take 1 tablet by mouth 2 times daily     vitamin D 1.25 MG (44118 UT) Caps capsule  Commonly known as: ERGOCALCIFEROL  Take 1 capsule by mouth once a week           * This list has 2 medication(s) that are the same as other medications prescribed for you. Read the directions carefully, and ask your doctor or other care provider to review them with you.                    DISCONTINUED MEDICATIONS:  Current Discharge Medication List          I am the Primary Clinician of Record.   Clay York MD (electronically signed)    (Please note that parts of this dictation were completed with voice recognition software. Quite often unanticipated grammatical, syntax, homophones, and other interpretive errors are inadvertently transcribed by the computer software. Please disregards these errors. Please excuse any errors that have escaped final proofreading.)          Clay York MD  03/01/25 [id number]

## 2025-03-20 ENCOUNTER — OFFICE VISIT (OUTPATIENT)
Age: 35
End: 2025-03-20

## 2025-03-20 VITALS
TEMPERATURE: 98.7 F | HEART RATE: 99 BPM | RESPIRATION RATE: 16 BRPM | BODY MASS INDEX: 38.77 KG/M2 | SYSTOLIC BLOOD PRESSURE: 117 MMHG | WEIGHT: 233 LBS | DIASTOLIC BLOOD PRESSURE: 85 MMHG | OXYGEN SATURATION: 97 %

## 2025-03-20 DIAGNOSIS — K52.9 GASTROENTERITIS: Primary | ICD-10-CM

## 2025-03-20 RX ORDER — ONDANSETRON 4 MG/1
4-8 TABLET, ORALLY DISINTEGRATING ORAL 3 TIMES DAILY PRN
Qty: 21 TABLET | Refills: 0 | Status: SHIPPED | OUTPATIENT
Start: 2025-03-20

## 2025-03-20 NOTE — PROGRESS NOTES
Patient Name: Monica Hemphill   YOB: 1990   Patient Status: Established patient,   Chief Complaint: Abdominal Pain (X Saturday, started with headache, diarrhea since then, headache persists)      ____________________________________________________________________________________________    External Records Reviewed: None    Limitation to History: None    Outside Historian: None    SUBJECTIVE/OBJECTIVE:  Monica Hemphill is a 34 y.o. female presents with complaint of generalized abdominal pain, diarrhea, nausea, and vomiting.  Symptoms began 4 day(s) ago and are improving since onset.  Patient describes pain as sharp 4/10 in severity, intermittent and without radiation. Patient reports vomiting resolved yesterday. She denies recent travel, blood in stool, or suspicious foods. 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  The patient denies fever or urinary symptoms. She does reports she has had headache stating it is not as bad as her migraines but she can't get rid of it.  She reports Neurology told her since she is on a Triptan she can't take OTC pain medications like Tylenol or Motrin.  Patient reports taking Pepto-Bismol for symptoms without relief. No other acute symptoms reported at this time. She reports poor appetite but she is drinking Pedialyte.          PAST MEDICAL HISTORY:   Medical: Pt  has a past medical history of Abdominal pain, epigastric (11/24/2011), Abnormal Pap smear, Acute tension-type headache (11/01/2024), Anxiety, Complication of anesthesia, Depression, Iron deficiency anemia (05/18/2020), Mild episode of recurrent major depressive disorder (4/21/2020), and Sleep apnea.  Surgical: Pt  has a past surgical history that includes Tubal ligation (12/14/2021); gyn; and Colonoscopy (10/2023).  Family: Pt family history includes Cancer in her maternal grandfather; Diabetes in her father, maternal grandfather, and paternal grandmother; Hypertension in her

## 2025-04-12 ASSESSMENT — ANXIETY QUESTIONNAIRES
IF YOU CHECKED OFF ANY PROBLEMS ON THIS QUESTIONNAIRE, HOW DIFFICULT HAVE THESE PROBLEMS MADE IT FOR YOU TO DO YOUR WORK, TAKE CARE OF THINGS AT HOME, OR GET ALONG WITH OTHER PEOPLE: VERY DIFFICULT
5. BEING SO RESTLESS THAT IT IS HARD TO SIT STILL: NEARLY EVERY DAY
3. WORRYING TOO MUCH ABOUT DIFFERENT THINGS: MORE THAN HALF THE DAYS
7. FEELING AFRAID AS IF SOMETHING AWFUL MIGHT HAPPEN: MORE THAN HALF THE DAYS
1. FEELING NERVOUS, ANXIOUS, OR ON EDGE: NEARLY EVERY DAY
6. BECOMING EASILY ANNOYED OR IRRITABLE: MORE THAN HALF THE DAYS
4. TROUBLE RELAXING: MORE THAN HALF THE DAYS
2. NOT BEING ABLE TO STOP OR CONTROL WORRYING: NEARLY EVERY DAY
IF YOU CHECKED OFF ANY PROBLEMS ON THIS QUESTIONNAIRE, HOW DIFFICULT HAVE THESE PROBLEMS MADE IT FOR YOU TO DO YOUR WORK, TAKE CARE OF THINGS AT HOME, OR GET ALONG WITH OTHER PEOPLE: VERY DIFFICULT
3. WORRYING TOO MUCH ABOUT DIFFERENT THINGS: MORE THAN HALF THE DAYS
1. FEELING NERVOUS, ANXIOUS, OR ON EDGE: NEARLY EVERY DAY
GAD7 TOTAL SCORE: 17
4. TROUBLE RELAXING: MORE THAN HALF THE DAYS
7. FEELING AFRAID AS IF SOMETHING AWFUL MIGHT HAPPEN: MORE THAN HALF THE DAYS
5. BEING SO RESTLESS THAT IT IS HARD TO SIT STILL: NEARLY EVERY DAY
6. BECOMING EASILY ANNOYED OR IRRITABLE: MORE THAN HALF THE DAYS
2. NOT BEING ABLE TO STOP OR CONTROL WORRYING: NEARLY EVERY DAY

## 2025-04-12 ASSESSMENT — PATIENT HEALTH QUESTIONNAIRE - PHQ9
2. FEELING DOWN, DEPRESSED OR HOPELESS: MORE THAN HALF THE DAYS
5. POOR APPETITE OR OVEREATING: MORE THAN HALF THE DAYS
7. TROUBLE CONCENTRATING ON THINGS, SUCH AS READING THE NEWSPAPER OR WATCHING TELEVISION: MORE THAN HALF THE DAYS
4. FEELING TIRED OR HAVING LITTLE ENERGY: NEARLY EVERY DAY
SUM OF ALL RESPONSES TO PHQ QUESTIONS 1-9: 19
3. TROUBLE FALLING OR STAYING ASLEEP: NEARLY EVERY DAY
8. MOVING OR SPEAKING SO SLOWLY THAT OTHER PEOPLE COULD HAVE NOTICED. OR THE OPPOSITE, BEING SO FIGETY OR RESTLESS THAT YOU HAVE BEEN MOVING AROUND A LOT MORE THAN USUAL: MORE THAN HALF THE DAYS
3. TROUBLE FALLING OR STAYING ASLEEP: NEARLY EVERY DAY
4. FEELING TIRED OR HAVING LITTLE ENERGY: NEARLY EVERY DAY
SUM OF ALL RESPONSES TO PHQ QUESTIONS 1-9: 19
1. LITTLE INTEREST OR PLEASURE IN DOING THINGS: MORE THAN HALF THE DAYS
5. POOR APPETITE OR OVEREATING: MORE THAN HALF THE DAYS
10. IF YOU CHECKED OFF ANY PROBLEMS, HOW DIFFICULT HAVE THESE PROBLEMS MADE IT FOR YOU TO DO YOUR WORK, TAKE CARE OF THINGS AT HOME, OR GET ALONG WITH OTHER PEOPLE: VERY DIFFICULT
6. FEELING BAD ABOUT YOURSELF - OR THAT YOU ARE A FAILURE OR HAVE LET YOURSELF OR YOUR FAMILY DOWN: MORE THAN HALF THE DAYS
9. THOUGHTS THAT YOU WOULD BE BETTER OFF DEAD, OR OF HURTING YOURSELF: SEVERAL DAYS
7. TROUBLE CONCENTRATING ON THINGS, SUCH AS READING THE NEWSPAPER OR WATCHING TELEVISION: MORE THAN HALF THE DAYS
8. MOVING OR SPEAKING SO SLOWLY THAT OTHER PEOPLE COULD HAVE NOTICED. OR THE OPPOSITE - BEING SO FIDGETY OR RESTLESS THAT YOU HAVE BEEN MOVING AROUND A LOT MORE THAN USUAL: MORE THAN HALF THE DAYS
6. FEELING BAD ABOUT YOURSELF - OR THAT YOU ARE A FAILURE OR HAVE LET YOURSELF OR YOUR FAMILY DOWN: MORE THAN HALF THE DAYS
SUM OF ALL RESPONSES TO PHQ QUESTIONS 1-9: 18
9. THOUGHTS THAT YOU WOULD BE BETTER OFF DEAD, OR OF HURTING YOURSELF: SEVERAL DAYS
10. IF YOU CHECKED OFF ANY PROBLEMS, HOW DIFFICULT HAVE THESE PROBLEMS MADE IT FOR YOU TO DO YOUR WORK, TAKE CARE OF THINGS AT HOME, OR GET ALONG WITH OTHER PEOPLE: VERY DIFFICULT
SUM OF ALL RESPONSES TO PHQ QUESTIONS 1-9: 19
SUM OF ALL RESPONSES TO PHQ QUESTIONS 1-9: 19
1. LITTLE INTEREST OR PLEASURE IN DOING THINGS: MORE THAN HALF THE DAYS
2. FEELING DOWN, DEPRESSED OR HOPELESS: MORE THAN HALF THE DAYS

## 2025-04-12 ASSESSMENT — COLUMBIA-SUICIDE SEVERITY RATING SCALE - C-SSRS
1. IN THE PAST MONTH, HAVE YOU WISHED YOU WERE DEAD OR WISHED YOU COULD GO TO SLEEP AND NOT WAKE UP?: YES
6. IN YOUR LIFETIME, HAVE YOU EVER DONE ANYTHING, STARTED TO DO ANYTHING, OR PREPARED TO DO ANYTHING TO END YOUR LIFE?: NO
2. IN THE PAST MONTH, HAVE YOU ACTUALLY HAD ANY THOUGHTS OF KILLING YOURSELF?: NO

## 2025-04-15 ENCOUNTER — OFFICE VISIT (OUTPATIENT)
Age: 35
End: 2025-04-15
Payer: MEDICAID

## 2025-04-15 VITALS
SYSTOLIC BLOOD PRESSURE: 143 MMHG | WEIGHT: 233 LBS | OXYGEN SATURATION: 98 % | HEART RATE: 65 BPM | TEMPERATURE: 98 F | RESPIRATION RATE: 20 BRPM | BODY MASS INDEX: 38.82 KG/M2 | HEIGHT: 65 IN | DIASTOLIC BLOOD PRESSURE: 97 MMHG

## 2025-04-15 DIAGNOSIS — F31.81 BIPOLAR II DISORDER (HCC): Primary | ICD-10-CM

## 2025-04-15 DIAGNOSIS — F41.1 GAD (GENERALIZED ANXIETY DISORDER): ICD-10-CM

## 2025-04-15 PROCEDURE — 90792 PSYCH DIAG EVAL W/MED SRVCS: CPT | Performed by: PSYCHIATRY & NEUROLOGY

## 2025-04-15 RX ORDER — TRAZODONE HYDROCHLORIDE 50 MG/1
50 TABLET ORAL NIGHTLY
Qty: 90 TABLET | Refills: 1 | Status: SHIPPED | OUTPATIENT
Start: 2025-04-15

## 2025-04-15 RX ORDER — LURASIDONE HYDROCHLORIDE 20 MG/1
20 TABLET, FILM COATED ORAL
Qty: 60 TABLET | Refills: 1 | Status: SHIPPED | OUTPATIENT
Start: 2025-04-15

## 2025-04-15 RX ORDER — BUSPIRONE HYDROCHLORIDE 5 MG/1
5 TABLET ORAL 2 TIMES DAILY
Qty: 60 TABLET | Refills: 1 | Status: SHIPPED | OUTPATIENT
Start: 2025-04-15 | End: 2025-06-14

## 2025-04-15 NOTE — PROGRESS NOTES
Chief Complaint   Patient presents with    Medication Check      Vitals:    04/15/25 1400   BP: (!) 143/97   Pulse: 65   Resp: 20   Temp: 98 °F (36.7 °C)   SpO2: 98%      Prior to Admission medications    Medication Sig Start Date End Date Taking? Authorizing Provider   ondansetron (ZOFRAN-ODT) 4 MG disintegrating tablet Take 1-2 tablets by mouth 3 times daily as needed for Nausea or Vomiting 3/20/25  Yes Alee Feliciano PA-C   vitamin D (ERGOCALCIFEROL) 1.25 MG (02879 UT) CAPS capsule Take 1 capsule by mouth once a week 1/20/25  Yes Omkar Bull MD   fluticasone (FLONASE) 50 MCG/ACT nasal spray 1 spray by Each Nostril route daily 12/22/24  Yes Bautista Roberts MD   albuterol sulfate HFA (VENTOLIN HFA) 108 (90 Base) MCG/ACT inhaler Inhale 2 puffs into the lungs 4 times daily as needed for Wheezing 12/22/24  Yes Bautista Roberts MD   rizatriptan (MAXALT) 10 MG tablet 1 tablet at onset of migraine may repeat every 2 hours to a maximum of 3 tablets per 24 hours in 2 days per 7-day cycle 11/26/24  Yes Monica Patel ANP   topiramate (TOPAMAX) 50 MG tablet Take 1 tablet by mouth 2 times daily 11/5/24  Yes Monica Patel ANP   butalbital-acetaminophen-caffeine (FIORICET, ESGIC) -40 MG per tablet Take 1 tablet by mouth every 6 hours as needed for Headaches 10/28/24  Yes Eliezer Sanchez DO   albuterol sulfate HFA (PROVENTIL;VENTOLIN;PROAIR) 108 (90 Base) MCG/ACT inhaler Inhale 2 puffs into the lungs every 6 hours as needed for Wheezing 7/15/24  Yes Vitor Moreno PA-C      PHQ-9 Total Score: (Patient-Rptd) 19 (4/12/2025 10:34 AM)  Thoughts that you would be better off dead, or of hurting yourself in some way: (Patient-Rptd) 1 (4/12/2025 10:34 AM)         4/12/2025    10:34 AM 11/26/2024     9:56 AM 11/5/2024     8:50 AM   PHQ-9    Little interest or pleasure in doing things 2 0 0   Feeling down, depressed, or hopeless 2 0 0   Trouble falling or staying asleep, or sleeping too much 3     Feeling tired

## 2025-04-15 NOTE — PROGRESS NOTES
Initial Psychiatric Assessment    ID: Monica Hemphill is a 34 y.o.   female referred by PCP for treatment of anxiety    Chief Complaint: \"I am feeling overstimulated\"    HPI: Monica Hemphill is a 34 y.o. year old female who presents with symptoms of anxiety and stress. Mood changes are significant and include irritability and anxiousness The patient reports that these symptoms began more than 2 months ago ago, and have been affecting day to life in the following ways: crying spells, difficulty falling asleep, anxiety, impaired concentration. Regarding sleep, the patient reports that sleep has been significantly affected by these symptoms, and typically gets about 4-6 hours of sleep each night, though she does not sleep every night. The patient is alert and oriented to person, place, time and situation and denies cognitive impairment. Pertinent life stressors include: Problems with primary support group, Problems related to social environment, and Occupational problems. 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 She reports these symptoms are not consistent (they last only for a few days). She reports situational stressors, including marital stressors.     Scales:        4/12/2025    10:34 AM 11/26/2024     9:56 AM 11/5/2024     8:50 AM 10/23/2024     3:10 PM 8/28/2023     9:53 AM 2/22/2023     9:59 AM 10/4/2022     8:35 AM   PHQ Scores   PHQ2 Score 4  0 0 0 0 0 2   PHQ9 Score 19  0 0 0 0 0 2       Patient-reported     Interpretation of Total Score Depression Severity: 1-4 = Minimal depression, 5-9 = Mild depression, 10-14 = Moderate depression, 15-19 = Moderately severe depression, 20-27 = Severe depression         4/12/2025    10:32 AM 10/23/2024     3:11 PM 8/28/2023     9:53 AM   CRISPIN 7 SCORE   CRISPIN-7 Total Score 17  0 0       Patient-reported     Interpretation of CRISPIN-7 score: 5-9 = mild anxiety, 10-14 =

## 2025-05-13 ENCOUNTER — OFFICE VISIT (OUTPATIENT)
Age: 35
End: 2025-05-13

## 2025-05-13 VITALS
RESPIRATION RATE: 18 BRPM | DIASTOLIC BLOOD PRESSURE: 84 MMHG | WEIGHT: 233 LBS | OXYGEN SATURATION: 97 % | SYSTOLIC BLOOD PRESSURE: 134 MMHG | TEMPERATURE: 98.5 F | BODY MASS INDEX: 38.77 KG/M2 | HEART RATE: 83 BPM

## 2025-05-13 DIAGNOSIS — M54.9 MID-BACK PAIN, ACUTE: Primary | ICD-10-CM

## 2025-05-13 DIAGNOSIS — M54.42 ACUTE MIDLINE LOW BACK PAIN WITH LEFT-SIDED SCIATICA: ICD-10-CM

## 2025-05-13 DIAGNOSIS — S29.012A MUSCLE STRAIN OF UPPER BACK: ICD-10-CM

## 2025-05-13 PROBLEM — F41.1 GENERALIZED ANXIETY DISORDER: Status: ACTIVE | Noted: 2025-05-13

## 2025-05-13 PROBLEM — R07.89 ATYPICAL CHEST PAIN: Status: ACTIVE | Noted: 2019-04-21

## 2025-05-13 PROBLEM — K92.1 HEMATOCHEZIA: Status: ACTIVE | Noted: 2025-05-13

## 2025-05-13 RX ORDER — CYCLOBENZAPRINE HCL 10 MG
10 TABLET ORAL 3 TIMES DAILY PRN
Qty: 30 TABLET | Refills: 0 | Status: SHIPPED | OUTPATIENT
Start: 2025-05-13 | End: 2025-05-23

## 2025-05-13 RX ORDER — LIDOCAINE 50 MG/G
1 PATCH TOPICAL DAILY
Qty: 10 PATCH | Refills: 0 | Status: SHIPPED | OUTPATIENT
Start: 2025-05-13 | End: 2025-05-23

## 2025-05-13 RX ORDER — NAPROXEN 500 MG/1
500 TABLET ORAL 2 TIMES DAILY PRN
Qty: 30 TABLET | Refills: 0 | Status: SHIPPED | OUTPATIENT
Start: 2025-05-13

## 2025-05-13 NOTE — PROGRESS NOTES
Patient Name: Monica Hemphill   YOB: 1990   Patient Status: Established patient,   Chief Complaint: Back Pain (C/o back pain. Sx 1 week . States her leg and arm are tingling and wants to make sure she has not broken anything.)      ____________________________________________________________________________________________    External Records Reviewed: None    Limitation to History: None    Outside Historian: None    SUBJECTIVE/OBJECTIVE:  Monica Hemphill is a 34 y.o. 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  She reports intermittent tingling to entire right hand and left leg.  Patient describes pain as sharp,  8/10 in severity, constant, and without pain radiation.  Symptoms are aggravated by any movement and alleviated by nothing.  The patient denies fever, urinary or GI symptoms, loss of bowel or bladder continence, or saddle numbness.  Patient reports taking Acetaminophen for symptoms without relief. No other acute symptoms reported at this time.          PAST MEDICAL HISTORY:   Medical: Pt  has a past medical history of Abdominal pain, epigastric (11/24/2011), Abnormal Pap smear, Acute tension-type headache (11/01/2024), Anxiety, Complication of anesthesia, Depression, Iron deficiency anemia (05/18/2020), Mild episode of recurrent major depressive disorder (4/21/2020), and Sleep apnea.  Surgical: Pt  has a past surgical history that includes Tubal ligation (12/14/2021); gyn; and Colonoscopy (10/2023).  Family: Pt family history includes Cancer in her maternal grandfather; Diabetes in her father, maternal grandfather, and paternal grandmother; Hypertension in her father and maternal grandfather; No Known Problems in her mother.  Social: Pt   Social History     Socioeconomic

## 2025-05-13 NOTE — PATIENT INSTRUCTIONS
Sit or lie in positions that are most comfortable and reduce your pain. Try one of these positions when you lie down:  Lie on your back with your knees bent and supported by pillows.  Lie on the floor with your legs on the seat of a sofa or chair.  Lie on your side with your knees and hips bent and a pillow between your legs.  Lie on your stomach if it does not make pain worse.  Do not stay in bed and avoid soft couches and twisted positions. Bedrest can help relieve pain at first, but it delays healing. Avoid bedrest after the first day of back pain.  Change positions every 30 minutes. If you must sit for long periods of time, take breaks from sitting. Get up and walk around or lie in a comfortable position.  Try using a heating pad on a low or medium setting for 15 to 20 minutes every 2 or 3 hours. Try a warm shower in place of one session with the heating pad.  You can also try an ice pack for 10 to 15 minutes every 2 to 3 hours. Put a thin cloth between the ice pack and your skin.  Take any prescribed medicines exactly as directed.  Can take Tylenol and/or Motrin as directed, may take together for more severe pain or alternate every 4 hours.   May try gentle stretching but no past the point of pain.  Return to work and other activities advancing as tolerated. Continued rest without activity is usually not good for your back.  To prevent future back pain, do exercises to stretch and strengthen your back and stomach. Learn how to use good posture, safe lifting techniques, and proper body mechanics.  Follow up with PCP for persistent or recurrent symptoms for possible need for physical therapy, advanced imaging or referral.  Go to ER if loss of bowel or bladder continence, numbness between legs, fever, extremity weakness or any new or concerning symptoms.

## 2025-07-11 ENCOUNTER — OFFICE VISIT (OUTPATIENT)
Facility: CLINIC | Age: 35
End: 2025-07-11
Payer: MEDICAID

## 2025-07-11 VITALS
DIASTOLIC BLOOD PRESSURE: 84 MMHG | BODY MASS INDEX: 40.17 KG/M2 | HEART RATE: 86 BPM | TEMPERATURE: 98.8 F | WEIGHT: 241.1 LBS | HEIGHT: 65 IN | OXYGEN SATURATION: 97 % | RESPIRATION RATE: 18 BRPM | SYSTOLIC BLOOD PRESSURE: 124 MMHG

## 2025-07-11 DIAGNOSIS — Z00.00 PHYSICAL EXAM: Primary | ICD-10-CM

## 2025-07-11 DIAGNOSIS — E55.9 VITAMIN D DEFICIENCY: ICD-10-CM

## 2025-07-11 DIAGNOSIS — F41.9 ANXIETY: ICD-10-CM

## 2025-07-11 DIAGNOSIS — R73.02 IMPAIRED GLUCOSE TOLERANCE: ICD-10-CM

## 2025-07-11 DIAGNOSIS — N31.9 NEUROGENIC BLADDER: ICD-10-CM

## 2025-07-11 DIAGNOSIS — F31.81 BIPOLAR II DISORDER (HCC): ICD-10-CM

## 2025-07-11 DIAGNOSIS — Z00.00 PHYSICAL EXAM: ICD-10-CM

## 2025-07-11 PROCEDURE — 99214 OFFICE O/P EST MOD 30 MIN: CPT | Performed by: INTERNAL MEDICINE

## 2025-07-11 RX ORDER — BUSPIRONE HYDROCHLORIDE 5 MG/1
5 TABLET ORAL 3 TIMES DAILY
COMMUNITY

## 2025-07-11 SDOH — ECONOMIC STABILITY: FOOD INSECURITY: WITHIN THE PAST 12 MONTHS, THE FOOD YOU BOUGHT JUST DIDN'T LAST AND YOU DIDN'T HAVE MONEY TO GET MORE.: NEVER TRUE

## 2025-07-11 SDOH — ECONOMIC STABILITY: FOOD INSECURITY: WITHIN THE PAST 12 MONTHS, YOU WORRIED THAT YOUR FOOD WOULD RUN OUT BEFORE YOU GOT MONEY TO BUY MORE.: NEVER TRUE

## 2025-07-11 ASSESSMENT — PATIENT HEALTH QUESTIONNAIRE - PHQ9
SUM OF ALL RESPONSES TO PHQ QUESTIONS 1-9: 0
1. LITTLE INTEREST OR PLEASURE IN DOING THINGS: NOT AT ALL
SUM OF ALL RESPONSES TO PHQ QUESTIONS 1-9: 0
2. FEELING DOWN, DEPRESSED OR HOPELESS: NOT AT ALL

## 2025-07-12 LAB
25(OH)D3+25(OH)D2 SERPL-MCNC: 22.4 NG/ML (ref 30–100)
ALBUMIN SERPL-MCNC: 3.9 G/DL (ref 3.9–4.9)
ALP SERPL-CCNC: 97 IU/L (ref 44–121)
ALT SERPL-CCNC: 12 IU/L (ref 0–32)
APPEARANCE UR: CLEAR
AST SERPL-CCNC: 14 IU/L (ref 0–40)
BACTERIA #/AREA URNS HPF: ABNORMAL /[HPF]
BASOPHILS # BLD AUTO: 0 X10E3/UL (ref 0–0.2)
BASOPHILS NFR BLD AUTO: 0 %
BILIRUB SERPL-MCNC: <0.2 MG/DL (ref 0–1.2)
BILIRUB UR QL STRIP: NEGATIVE
BUN SERPL-MCNC: 8 MG/DL (ref 6–20)
BUN/CREAT SERPL: 13 (ref 9–23)
CALCIUM SERPL-MCNC: 9.1 MG/DL (ref 8.7–10.2)
CASTS URNS QL MICRO: ABNORMAL /LPF
CHLORIDE SERPL-SCNC: 105 MMOL/L (ref 96–106)
CHOLEST SERPL-MCNC: 181 MG/DL (ref 100–199)
CO2 SERPL-SCNC: 24 MMOL/L (ref 20–29)
COLOR UR: YELLOW
CREAT SERPL-MCNC: 0.61 MG/DL (ref 0.57–1)
EGFRCR SERPLBLD CKD-EPI 2021: 120 ML/MIN/1.73
EOSINOPHIL # BLD AUTO: 0.1 X10E3/UL (ref 0–0.4)
EOSINOPHIL NFR BLD AUTO: 1 %
EPI CELLS #/AREA URNS HPF: ABNORMAL /HPF (ref 0–10)
ERYTHROCYTE [DISTWIDTH] IN BLOOD BY AUTOMATED COUNT: 13.6 % (ref 11.7–15.4)
EST. AVERAGE GLUCOSE BLD GHB EST-MCNC: 114 MG/DL
GLOBULIN SER CALC-MCNC: 2.7 G/DL (ref 1.5–4.5)
GLUCOSE SERPL-MCNC: 108 MG/DL (ref 70–99)
GLUCOSE UR QL STRIP: NEGATIVE
HBA1C MFR BLD: 5.6 % (ref 4.8–5.6)
HCT VFR BLD AUTO: 45.3 % (ref 34–46.6)
HDLC SERPL-MCNC: 29 MG/DL
HGB BLD-MCNC: 14 G/DL (ref 11.1–15.9)
HGB UR QL STRIP: NEGATIVE
IMM GRANULOCYTES # BLD AUTO: 0 X10E3/UL (ref 0–0.1)
IMM GRANULOCYTES NFR BLD AUTO: 0 %
KETONES UR QL STRIP: NEGATIVE
LDLC SERPL CALC-MCNC: 130 MG/DL (ref 0–99)
LEUKOCYTE ESTERASE UR QL STRIP: ABNORMAL
LYMPHOCYTES # BLD AUTO: 2.2 X10E3/UL (ref 0.7–3.1)
LYMPHOCYTES NFR BLD AUTO: 24 %
MCH RBC QN AUTO: 28.6 PG (ref 26.6–33)
MCHC RBC AUTO-ENTMCNC: 30.9 G/DL (ref 31.5–35.7)
MCV RBC AUTO: 92 FL (ref 79–97)
MICRO URNS: ABNORMAL
MONOCYTES # BLD AUTO: 0.7 X10E3/UL (ref 0.1–0.9)
MONOCYTES NFR BLD AUTO: 8 %
NEUTROPHILS # BLD AUTO: 6.3 X10E3/UL (ref 1.4–7)
NEUTROPHILS NFR BLD AUTO: 67 %
NITRITE UR QL STRIP: NEGATIVE
PH UR STRIP: 6.5 [PH] (ref 5–7.5)
PLATELET # BLD AUTO: 211 X10E3/UL (ref 150–450)
POTASSIUM SERPL-SCNC: 5 MMOL/L (ref 3.5–5.2)
PROT SERPL-MCNC: 6.6 G/DL (ref 6–8.5)
PROT UR QL STRIP: NEGATIVE
RBC # BLD AUTO: 4.9 X10E6/UL (ref 3.77–5.28)
RBC #/AREA URNS HPF: ABNORMAL /HPF (ref 0–2)
SODIUM SERPL-SCNC: 142 MMOL/L (ref 134–144)
SP GR UR STRIP: 1.02 (ref 1–1.03)
TRIGL SERPL-MCNC: 119 MG/DL (ref 0–149)
UROBILINOGEN UR STRIP-MCNC: 1 MG/DL (ref 0.2–1)
VLDLC SERPL CALC-MCNC: 22 MG/DL (ref 5–40)
WBC # BLD AUTO: 9.5 X10E3/UL (ref 3.4–10.8)
WBC #/AREA URNS HPF: ABNORMAL /HPF (ref 0–5)

## 2025-07-13 LAB — BACTERIA UR CULT: NORMAL

## 2025-07-14 ENCOUNTER — TELEMEDICINE (OUTPATIENT)
Age: 35
End: 2025-07-14
Payer: MEDICAID

## 2025-07-14 DIAGNOSIS — G43.119 INTRACTABLE MIGRAINE WITH AURA WITHOUT STATUS MIGRAINOSUS: Primary | ICD-10-CM

## 2025-07-14 DIAGNOSIS — E55.9 VITAMIN D DEFICIENCY: ICD-10-CM

## 2025-07-14 PROBLEM — G43.909 ACUTE MIGRAINE: Status: RESOLVED | Noted: 2024-11-05 | Resolved: 2025-07-14

## 2025-07-14 PROCEDURE — 99214 OFFICE O/P EST MOD 30 MIN: CPT | Performed by: PSYCHIATRY & NEUROLOGY

## 2025-07-14 PROCEDURE — G2211 COMPLEX E/M VISIT ADD ON: HCPCS | Performed by: PSYCHIATRY & NEUROLOGY

## 2025-07-14 RX ORDER — TOPIRAMATE 100 MG/1
100 TABLET, FILM COATED ORAL DAILY
Qty: 90 TABLET | Refills: 3 | Status: SHIPPED | OUTPATIENT
Start: 2025-07-14

## 2025-07-14 RX ORDER — RIZATRIPTAN BENZOATE 10 MG/1
TABLET ORAL
Qty: 9 TABLET | Refills: 11 | Status: SHIPPED | OUTPATIENT
Start: 2025-07-14

## 2025-07-14 RX ORDER — ERGOCALCIFEROL 1.25 MG/1
50000 CAPSULE, LIQUID FILLED ORAL WEEKLY
Qty: 12 CAPSULE | Refills: 3 | Status: SHIPPED | OUTPATIENT
Start: 2025-07-14

## 2025-07-14 RX ORDER — TOPIRAMATE 100 MG/1
100 TABLET, FILM COATED ORAL DAILY
COMMUNITY
End: 2025-07-14 | Stop reason: SDUPTHER

## 2025-07-14 NOTE — ASSESSMENT & PLAN NOTE
Migraines remain well-controlled on Topamax 100 mg daily.    Her frequency intensity and duration of migraines have dramatically reduced   In the past she has had rizatriptan to manage her headaches and migraines she needs a new prescription as she no longer has an active prescription     We will renew the rizatriptan.      Therapy no further ED visits or urgent care visits related to migraines since her last office visit

## 2025-07-14 NOTE — PROGRESS NOTES
Chief Complaint   Patient presents with    Follow-up     \"Have you been to the ER, urgent care clinic since your last visit?  Hospitalized since your last visit?\"    YES - When: approximately 5 months ago.  Where and Why: [facility] ED.    “Have you seen or consulted any other health care providers outside of [facility] since your last visit?”    NO         
patient clearly attest that she is not a headache or migraine suffer recently or even in the remote past.  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  Missed personal time: all week    Personal history: [] Head trauma [x] Concussion as a child [] Meningitis [] other CNS infection or injury [] No injury or illness affecting the CNS      Family Hx: no; great grandmother had a brain aneurysm    Risk Factors for headaches  Smoking: [x] Yes: 1 ppd [] no[] former  Coffee: 1 cups/day  Tea: 1 cups/day  Soda: 0 oz/day  Water: 32+ oz/day  Sleep: at best 6 hours per night  [] GONZALEZ untreated [x] GONZALEZ treated but not adherent to treatment [] history of snoring [] insomnia    Medication review:  Preventative therapy:  [] Topiramate  [] Duloxetine  [] Venlafaxine  [] Amitriptyline  [] Nortriptyline  [] Propranolol/metoprolol/beta-blocker  [] Depakote  [] Zonisamide  [] Botox  [] CGRP therapy: List of therapies tried [if applicable]:   [] Other:       Abortive therapy:  [x] Butalbital: q 6 hours  [] OTCs  [] Ergotamine [Cafergot/DHE/other]  [] Triptan therapy: List of therapies tried [if applicable]:  [] CGRP therapy: List of therapies tried [if applicable]:  [] other    Frequency of use abortive therapy: [x] Daily[]1-2 days/week [] 2 to 3 days/week[]More than 3 days/week [] 2-3 times per month [] Other    Number of doses used of abortive therapy at the time of headache/migraine within a 24-hour.:[] Once [] up to 2 doses[x] 3 or more doses    Efficacy of rescue protocol:[] Completely aborts headache pain [x] mostly relieved but

## 2025-07-14 NOTE — ASSESSMENT & PLAN NOTE
Lab Results   Component Value Date/Time    VITD25 22.4 07/11/2025 12:00 AM      This level is up from 10.  She is no longer taking her vitamin D supplements.     We discussed the value of having therapeutic vitamin D regarding health benefits  I have renewed the vitamin D weekly for a 90-day supply with refills x 1 year  Target range is to be solidly therapeutic at the very least being greater than 40

## 2025-07-14 NOTE — PATIENT INSTRUCTIONS
call or message. Be mindful your provider may be out of the office or your message may require further review. We encourage you to use [employer] for your messages as this is a faster, more efficient way to communicate with our office    Medication Refills:  Prescription medications require up to 48 business hours to process. We encourage you to use [employer] for your refills.     For controlled medications: Please allow up to 72 business hours to process. Certain medications may require you to  a written prescription at our office.    NO narcotic/controlled medications will be prescribed after 4pm Monday through Friday or on weekends    Form/Paperwork Completion:  We ask that you allow 7-14 business days.  Forms can be downloaded to [employer] or you can have them faxed, mailed, or you can bring them in to our office directly.

## 2025-07-21 ENCOUNTER — RESULTS FOLLOW-UP (OUTPATIENT)
Facility: CLINIC | Age: 35
End: 2025-07-21